# Patient Record
Sex: FEMALE | Race: WHITE | Employment: OTHER | URBAN - NONMETROPOLITAN AREA
[De-identification: names, ages, dates, MRNs, and addresses within clinical notes are randomized per-mention and may not be internally consistent; named-entity substitution may affect disease eponyms.]

---

## 2017-09-17 ENCOUNTER — HOSPITAL ENCOUNTER (EMERGENCY)
Age: 47
Discharge: HOME | End: 2017-09-17
Attending: EMERGENCY MEDICINE
Payer: COMMERCIAL

## 2017-09-17 ENCOUNTER — APPOINTMENT (OUTPATIENT)
Dept: GENERAL RADIOLOGY | Age: 47
End: 2017-09-17
Payer: COMMERCIAL

## 2017-09-17 VITALS
TEMPERATURE: 98.3 F | DIASTOLIC BLOOD PRESSURE: 80 MMHG | HEART RATE: 119 BPM | SYSTOLIC BLOOD PRESSURE: 115 MMHG | RESPIRATION RATE: 18 BRPM | OXYGEN SATURATION: 98 %

## 2017-09-17 DIAGNOSIS — S93.402A SPRAIN OF LEFT ANKLE, UNSPECIFIED LIGAMENT, INITIAL ENCOUNTER: Primary | ICD-10-CM

## 2017-09-17 PROCEDURE — 73610 X-RAY EXAM OF ANKLE: CPT

## 2017-09-17 PROCEDURE — 99283 EMERGENCY DEPT VISIT LOW MDM: CPT

## 2017-09-17 RX ORDER — LORATADINE 10 MG/1
10 TABLET ORAL DAILY
COMMUNITY
End: 2019-04-25 | Stop reason: SDUPTHER

## 2017-09-17 RX ORDER — OMEPRAZOLE 20 MG/1
20 CAPSULE, DELAYED RELEASE ORAL DAILY
COMMUNITY
End: 2019-04-25 | Stop reason: SDUPTHER

## 2017-09-17 RX ORDER — NAPROXEN 375 MG/1
375 TABLET ORAL 2 TIMES DAILY
Qty: 12 TABLET | Refills: 0 | Status: SHIPPED | OUTPATIENT
Start: 2017-09-17 | End: 2019-04-10

## 2017-09-17 RX ORDER — LISINOPRIL 20 MG/1
20 TABLET ORAL DAILY
COMMUNITY
End: 2019-02-25 | Stop reason: ALTCHOICE

## 2017-09-17 RX ORDER — SIMVASTATIN 40 MG
40 TABLET ORAL NIGHTLY
COMMUNITY
End: 2019-04-25 | Stop reason: SDUPTHER

## 2017-09-17 ASSESSMENT — PAIN DESCRIPTION - ONSET: ONSET: SUDDEN

## 2017-09-17 ASSESSMENT — PAIN DESCRIPTION - DESCRIPTORS: DESCRIPTORS: DULL

## 2017-09-17 ASSESSMENT — PAIN DESCRIPTION - LOCATION: LOCATION: ANKLE

## 2017-09-17 ASSESSMENT — PAIN DESCRIPTION - PAIN TYPE: TYPE: ACUTE PAIN

## 2017-09-17 ASSESSMENT — PAIN SCALES - GENERAL: PAINLEVEL_OUTOF10: 2

## 2017-09-17 ASSESSMENT — PAIN DESCRIPTION - DIRECTION: RADIATING_TOWARDS: DENIES

## 2017-09-17 ASSESSMENT — PAIN DESCRIPTION - PROGRESSION: CLINICAL_PROGRESSION: GRADUALLY IMPROVING

## 2017-09-17 ASSESSMENT — PAIN DESCRIPTION - FREQUENCY: FREQUENCY: INTERMITTENT

## 2017-09-17 ASSESSMENT — PAIN DESCRIPTION - ORIENTATION: ORIENTATION: LEFT;OUTER

## 2017-10-01 ENCOUNTER — HOSPITAL ENCOUNTER (EMERGENCY)
Age: 47
Discharge: HOME | End: 2017-10-01
Attending: EMERGENCY MEDICINE
Payer: COMMERCIAL

## 2017-10-01 VITALS
OXYGEN SATURATION: 99 % | DIASTOLIC BLOOD PRESSURE: 100 MMHG | RESPIRATION RATE: 20 BRPM | HEART RATE: 100 BPM | SYSTOLIC BLOOD PRESSURE: 143 MMHG | TEMPERATURE: 96 F

## 2017-10-01 DIAGNOSIS — B30.9 VIRAL CONJUNCTIVITIS: ICD-10-CM

## 2017-10-01 DIAGNOSIS — J06.9 VIRAL URI: Primary | ICD-10-CM

## 2017-10-01 PROCEDURE — 99282 EMERGENCY DEPT VISIT SF MDM: CPT

## 2017-10-01 RX ORDER — AMOXICILLIN 250 MG/1
250 CAPSULE ORAL 3 TIMES DAILY
COMMUNITY
End: 2018-10-15 | Stop reason: ALTCHOICE

## 2017-10-01 RX ORDER — PREDNISONE 20 MG/1
40 TABLET ORAL DAILY
Qty: 10 TABLET | Refills: 0 | Status: SHIPPED | OUTPATIENT
Start: 2017-10-01 | End: 2017-10-06

## 2017-10-01 RX ORDER — BROMPHENIRAMINE MALEATE, PSEUDOEPHEDRINE HYDROCHLORIDE, AND DEXTROMETHORPHAN HYDROBROMIDE 2; 30; 10 MG/5ML; MG/5ML; MG/5ML
5 SYRUP ORAL 4 TIMES DAILY PRN
Qty: 100 ML | Refills: 0 | Status: SHIPPED | OUTPATIENT
Start: 2017-10-01 | End: 2017-10-06

## 2017-10-01 ASSESSMENT — PAIN SCALES - GENERAL
PAINLEVEL_OUTOF10: 6
PAINLEVEL_OUTOF10: 6

## 2017-10-01 ASSESSMENT — PAIN DESCRIPTION - LOCATION: LOCATION: GENERALIZED

## 2017-10-01 NOTE — ED PROVIDER NOTES
N/A     Social History Main Topics    Smoking status: Never Smoker    Smokeless tobacco: Never Used    Alcohol use No    Drug use: No    Sexual activity: Not Asked     Other Topics Concern    None     Social History Narrative       PHYSICAL EXAM    VITAL SIGNS: BP (!) 143/100 Comment: states she did not take BP med yet today  Temp 96 °F (35.6 °C) (Temporal)   Resp 20  SpO2 99%  Constitutional:  Well developed, well nourished, no acute distress, non-toxic appearance   Eyes: PERRL, left conjunctival injection mild HENT:  Nasal congestion,  Respiratory:  Lungs are clear to auscultation bilateral  Cardiovascular:  Normal rate, normal rhythm, no murmurs, no gallops, no rubs   Musculoskeletal:  No edema   Integument:  Well hydrated, no rash     RADIOLOGY/PROCEDURES    No orders to display         Summation      Patient Course: The warning signs were discussed. Return to ED if worse. ED Medications administered this visit:  Medications - No data to display    New Prescriptions from this visit:    New Prescriptions    BROMPHENIRAMINE-PSEUDOEPHEDRINE-DM 30-2-10 MG/5ML SYRUP    Take 5 mLs by mouth 4 times daily as needed for Congestion or Cough    NAPHAZOLINE-PHENIRAMINE (NAPHCON-A) 0.025-0.3 % OPHTHALMIC SOLUTION    Place 1 drop into the left eye 4 times daily    PREDNISONE (DELTASONE) 20 MG TABLET    Take 2 tablets by mouth daily for 5 doses       Follow-up:  No follow-up provider specified. Final Impression:   1. Viral URI    2.  Viral conjunctivitis               (Please note that portions of this note were completed with a voice recognition program.  Efforts were made to edit the dictations but occasionally words are mis-transcribed.)         Becky Cortez MD  10/01/17 0298

## 2017-10-01 NOTE — ED AVS SNAPSHOT
naphazoline-pheniramine 0.025-0.3 % ophthalmic solution    predniSONE 20 MG tablet               Allergies as of 10/1/2017     No Known Allergies      Immunizations as of 10/1/2017     No immunizations on file. After Visit Summary    This summary was created for you. Thank you for entrusting your care to us. The following information includes details about your hospital/visit stay along with steps you should take to help with your recovery once you leave the hospital.  In this packet, you will find information about the topics listed below:    · Instructions about your medications including a list of your home medications  · A summary of your hospital visit  · Follow-up appointments once you have left the hospital  · Your care plan at home      You may receive a survey regarding the care you received during your stay. Your input is valuable to us. We encourage you to complete and return your survey in the envelope provided. We hope you will choose us in the future for your healthcare needs. Patient Information     Patient Name CHERELLE Brown 1970      Care Provided at:     Name Address Phone       [address] [phone]            Your Visit    Here you will find information about your visit, including the reason for your visit. Please take this sheet with you when you visit your doctor or other health care provider in the future. It will help determine the best possible medical care for you at that time. If you have any questions once you leave the hospital, please call the department phone number listed below. Diagnoses this visit     Your diagnoses were VIRAL URI and VIRAL CONJUNCTIVITIS.       Visit Information     Date of Visit Department Dept Phone    10/1/2017 [facility] -[phone]      You were seen by     You were seen by Irasema Wills MD.       Follow-up Appointments Below is a list of your follow-up and future appointments. This may not be a complete list as you may have made appointments directly with providers that we are not aware of or your providers may have made some for you. Please call your providers to confirm appointments. It is important to keep your appointments. Please bring your current insurance card, photo ID, co-pay, and all medication bottles to your appointment. If self-pay, payment is expected at the time of service. Preventive Care        Date Due    HIV screening is recommended for all people regardless of risk factors  aged 15-65 years at least once (lifetime) who have never been HIV tested. 2/15/1984    Tetanus Combination Vaccine (1 - Tdap) 2/15/1988    Pap Smear 2/15/1990    Cholesterol Screening 2/15/2009    Yearly Flu Vaccine (1) 9/1/2017                 Care Plan Once You Return Home    This section includes instructions you will need to follow once you leave the hospital.  Your care team will discuss these with you, so you and those caring for you know how to best care for your health needs at home. This section may also include educational information about certain health topics that may be of help to you. Important Information if you smoke or are exposed to smoking       SMOKING: QUIT SMOKING. THIS IS THE MOST IMPORTANT ACTION YOU CAN TAKE TO IMPROVE YOUR CURRENT AND FUTURE HEALTH. Call the [address] [place] [address] at [place] NOW ([phone])    Smoking harms nonsmokers. When nonsmokers are around people who smoke, they absorb nicotine, carbon monoxide, and other ingredients of tobacco smoke. DO NOT SMOKE AROUND CHILDREN     Children exposed to secondhand smoke are at an increased risk of:  Sudden Infant Death Syndrome (SIDS), acute respiratory infections, inflammation of the middle ear, and severe asthma.   Over a longer time, it causes heart disease and lung cancer. There is no safe level of exposure to secondhand smoke. MyChart Signup     [employer] allows you to send messages to your doctor, view your test results, renew your prescriptions, schedule appointments, view visit notes, and more. How Do I Sign Up? 1. In your Internet browser, go to [email or web address]  2. Click on the Sign Up Now link in the Sign In box. You will see the New Member Sign Up page. 3. Enter your [employer] Access Code exactly as it appears below. You will not need to use this code after youve completed the sign-up process. If you do not sign up before the expiration date, you must request a new code. [employer] Access Code: [email or web address]  Expires: 11/16/2017  7:18 PM    4. Enter your Social Security Number ([id number]) and Date of Birth (mm/dd/yyyy) as indicated and click Submit. You will be taken to the next sign-up page. 5. Create a [employer] ID. This will be your [employer] login ID and cannot be changed, so think of one that is secure and easy to remember. 6. Create a [employer] password. You can change your password at any time. 7. Enter your Password Reset Question and Answer. This can be used at a later time if you forget your password. 8. Enter your e-mail address. You will receive e-mail notification when new information is available in [address]. 9. Click Sign Up. You can now view your medical record. Additional Information  If you have questions, please contact the physician practice where you receive care. Remember, [employer] is NOT to be used for urgent needs. For medical emergencies, dial 911. For questions regarding your [employer] account call [phone]. If you have a clinical question, please call your doctor's office. View your information online  ? Review your current list of  medications, immunization, and allergies. ? Review your future test results online . ? Review your discharge instructions provided by your caregivers at discharge    Certain functionality such as prescription refills, scheduling appointments or sending messages to your provider are not activated if your provider does not use [employer] in his/her office    For questions regarding your MyChart account call [phone]. If you have a clinical question, please call your doctor's office. The information on all pages of the After Visit Summary has been reviewed with me, the patient and/or responsible adult, by my health care provider(s). I had the opportunity to ask questions regarding this information. I understand I should dispose of my armband safely at home to protect my health information. A complete copy of the After Visit Summary has been given to me, the patient and/or responsible adult. Patient Signature/Responsible Adult: ___________________________________    Nurse Signature: ___________________________________  Resident/MLP Signature: ___________________________________  Attending Signature: ___________________________________    Date:____________Time:____________              Discharge Instructions            Pinkeye: Care Instructions  Your Care Instructions    Pinkeye is redness and swelling of the eye surface and the conjunctiva (the lining of the eyelid and the covering of the white part of the eye). Pinkeye is also called conjunctivitis. Pinkeye is often caused by infection with bacteria or a virus. Dry air, allergies, smoke, and chemicals are other common causes. Pinkeye often clears on its own in 7 to 10 days. Antibiotics only help if the pinkeye is caused by bacteria. Pinkeye caused by infection spreads easily. If an allergy or chemical is causing pinkeye, it will not go away unless you can avoid whatever is causing it. Follow-up care is a key part of your treatment and safety.  Be sure to make and go to all appointments, and call your doctor if you are having

## 2017-10-02 RX ORDER — GABAPENTIN 100 MG/1
100 CAPSULE ORAL NIGHTLY
COMMUNITY
End: 2019-02-25 | Stop reason: ALTCHOICE

## 2018-10-15 ENCOUNTER — OFFICE VISIT (OUTPATIENT)
Dept: PRIMARY CARE CLINIC | Age: 48
End: 2018-10-15
Payer: COMMERCIAL

## 2018-10-15 VITALS
HEART RATE: 93 BPM | WEIGHT: 250 LBS | DIASTOLIC BLOOD PRESSURE: 89 MMHG | TEMPERATURE: 98.1 F | OXYGEN SATURATION: 99 % | SYSTOLIC BLOOD PRESSURE: 136 MMHG

## 2018-10-15 DIAGNOSIS — J01.00 ACUTE MAXILLARY SINUSITIS, RECURRENCE NOT SPECIFIED: Primary | ICD-10-CM

## 2018-10-15 PROCEDURE — G8484 FLU IMMUNIZE NO ADMIN: HCPCS | Performed by: NURSE PRACTITIONER

## 2018-10-15 PROCEDURE — 99202 OFFICE O/P NEW SF 15 MIN: CPT | Performed by: NURSE PRACTITIONER

## 2018-10-15 PROCEDURE — G8421 BMI NOT CALCULATED: HCPCS | Performed by: NURSE PRACTITIONER

## 2018-10-15 PROCEDURE — G8427 DOCREV CUR MEDS BY ELIG CLIN: HCPCS | Performed by: NURSE PRACTITIONER

## 2018-10-15 PROCEDURE — 1036F TOBACCO NON-USER: CPT | Performed by: NURSE PRACTITIONER

## 2018-10-15 RX ORDER — AMOXICILLIN AND CLAVULANATE POTASSIUM 875; 125 MG/1; MG/1
1 TABLET, FILM COATED ORAL 2 TIMES DAILY
Qty: 20 TABLET | Refills: 0 | Status: SHIPPED | OUTPATIENT
Start: 2018-10-15 | End: 2018-10-25

## 2018-10-15 RX ORDER — ESTRADIOL 0.5 MG/1
TABLET ORAL
Refills: 0 | COMMUNITY
Start: 2018-09-12 | End: 2019-04-25 | Stop reason: ALTCHOICE

## 2018-10-15 RX ORDER — GUAIFENESIN 600 MG/1
600 TABLET, EXTENDED RELEASE ORAL 2 TIMES DAILY
Qty: 20 TABLET | Refills: 0 | Status: SHIPPED | OUTPATIENT
Start: 2018-10-15 | End: 2018-10-25

## 2018-10-15 RX ORDER — SERTRALINE HYDROCHLORIDE 100 MG/1
100 TABLET, FILM COATED ORAL DAILY
Refills: 0 | COMMUNITY
Start: 2018-08-09 | End: 2019-04-25 | Stop reason: SDUPTHER

## 2018-10-15 RX ORDER — GLUCOSAMINE/CHONDR SU A SOD 750-600 MG
TABLET ORAL
Refills: 0 | COMMUNITY
Start: 2018-10-10 | End: 2019-04-10

## 2018-10-15 RX ORDER — CYCLOBENZAPRINE HCL 10 MG
TABLET ORAL
Refills: 0 | COMMUNITY
Start: 2018-09-13 | End: 2019-02-25 | Stop reason: SDUPTHER

## 2018-10-15 ASSESSMENT — ENCOUNTER SYMPTOMS
HOARSE VOICE: 1
RHINORRHEA: 1
NAUSEA: 0
DIARRHEA: 0
VOMITING: 0
SINUS PAIN: 1
SORE THROAT: 1
SINUS PRESSURE: 1

## 2018-10-15 NOTE — PATIENT INSTRUCTIONS
taking amoxicillin with clarithromycin and/or lansoprazole to treat stomach ulcer, use all of your medications as directed. Read the medication guide or patient instructions provided with each medication. Do not change your doses or medication schedule without your doctor's advice. Use this medicine for the full prescribed length of time. Your symptoms may improve before the infection is completely cleared. Skipping doses may also increase your risk of further infection that is resistant to antibiotics. Amoxicillin will not treat a viral infection such as the flu or a common cold. Do not share this medicine with another person, even if they have the same symptoms you have. This medicine can cause unusual results with certain medical tests. Tell any doctor who treats you that you are using amoxicillin. Store at room temperature away from moisture, heat, and light. You may store liquid amoxicillin in a refrigerator but do not allow it to freeze. Throw away any liquid amoxicillin that is not used within 14 days after it was mixed at the pharmacy. What happens if I miss a dose? Take the missed dose as soon as you remember. Skip the missed dose if it is almost time for your next scheduled dose. Do not take extra medicine to make up the missed dose. What happens if I overdose? Seek emergency medical attention or call the Poison Help line at 1-601.873.3020. Overdose symptoms may include confusion, behavior changes, a severe skin rash, urinating less than usual, or seizure (black-out or convulsions). What should I avoid while taking amoxicillin? Antibiotic medicines can cause diarrhea, which may be a sign of a new infection. If you have diarrhea that is watery or bloody, stop using amoxicillin and call your doctor. Do not use anti-diarrhea medicine unless your doctor tells you to. What are the possible side effects of amoxicillin?   Get emergency medical help if you have any of these signs of an allergic reaction: hives; difficulty breathing; swelling of your face, lips, tongue, or throat. Call your doctor at once if you have:  · diarrhea that is watery or bloody;  · fever, swollen gums, painful mouth sores, pain when swallowing, skin sores, cold or flu symptoms, cough, trouble breathing;  · swollen glands, rash or itching, joint pain, or general ill feeling;  · pale or yellowed skin, yellowing of the eyes, dark colored urine, fever, confusion or weakness;  · severe tingling, numbness, pain, muscle weakness;  · easy bruising, unusual bleeding (nose, mouth, vagina, or rectum), purple or red pinpoint spots under your skin; or  · severe skin reaction --fever, sore throat, swelling in your face or tongue, burning in your eyes, skin pain, followed by a red or purple skin rash that spreads (especially in the face or upper body) and causes blistering and peeling. Common side effects may include:  · stomach pain, nausea, vomiting, diarrhea;  · vaginal itching or discharge;  · headache; or  · swollen, black, or \"hairy\" tongue. This is not a complete list of side effects and others may occur. Call your doctor for medical advice about side effects. You may report side effects to FDA at 6-707-FDA-4000. What other drugs will affect amoxicillin? Other drugs may interact with amoxicillin, including prescription and over-the-counter medicines, vitamins, and herbal products. Tell each of your health care providers about all medicines you use now and any medicine you start or stop using. Where can I get more information? Your pharmacist can provide more information about amoxicillin. Remember, keep this and all other medicines out of the reach of children, never share your medicines with others, and use this medication only for the indication prescribed.   Every effort has been made to ensure that the information provided by [address] is accurate, up-to-date, and complete, but no guarantee is made to that effect. Drug information contained herein may be time sensitive. [employer] information has been compiled for use by healthcare practitioners and consumers in the [place] and therefore [employer] does not warrant that uses outside of the [place] are appropriate, unless specifically indicated otherwise. [employer] drug information does not endorse drugs, diagnose patients or recommend therapy. [employer] drug information is an informational resource designed to assist licensed healthcare practitioners in caring for their patients and/or to serve consumers viewing this service as a supplement to, and not a substitute for, the expertise, skill, knowledge and judgment of healthcare practitioners. The absence of a warning for a given drug or drug combination in no way should be construed to indicate that the drug or drug combination is safe, effective or appropriate for any given patient. [facility] does not assume any responsibility for any aspect of healthcare administered with the aid of information [facility] provides. The information contained herein is not intended to cover all possible uses, directions, precautions, warnings, drug interactions, allergic reactions, or adverse effects. If you have questions about the drugs you are taking, check with your doctor, nurse or pharmacist.  Copyright 6579-1621 [address] Version: 9.05. Revision date: 7/22/2016. Care instructions adapted under license by [facility] [address] If you have questions about a medical condition or this instruction, always ask your healthcare professional. [place] [address] any warranty or liability for your use of this information. Sinusitis: Care Instructions  Your Care Instructions    Sinusitis is an infection of the lining of the sinus cavities in your head. Sinusitis often follows a cold. It causes pain and pressure in your head and face. In most cases, sinusitis gets better on its own in 1 to 2 weeks.  But some mild seek immediate medical care if:    · You have new or worse swelling or redness in your face or around your eyes.     · You have a new or higher fever.    Watch closely for changes in your health, and be sure to contact your doctor if:    · You have new or worse facial pain.     · The mucus from your nose becomes thicker (like pus) or has new blood in it.     · You are not getting better as expected. Where can you learn more? Go to [email or web address] and sign in to your mytrax account. Enter I180 in the USMD box to learn more about \"Sinusitis: Care Instructions. \"     If you do not have an account, please click on the \"Sign Up Now\" link. Current as of: May 12, 2017  Content Version: 11.7  © 0471-5843 [employer]. Care instructions adapted under license by [facility] ([facility]). If you have questions about a medical condition or this instruction, always ask your healthcare professional. [address] any warranty or liability for your use of this information.

## 2018-12-10 ENCOUNTER — HOSPITAL ENCOUNTER (OUTPATIENT)
Dept: PHYSICAL THERAPY | Age: 48
Setting detail: THERAPIES SERIES
Discharge: HOME | End: 2018-12-10
Payer: COMMERCIAL

## 2018-12-10 PROCEDURE — 97140 MANUAL THERAPY 1/> REGIONS: CPT

## 2018-12-10 PROCEDURE — 97162 PT EVAL MOD COMPLEX 30 MIN: CPT

## 2018-12-10 ASSESSMENT — PAIN SCALES - GENERAL: PAINLEVEL_OUTOF10: 6

## 2018-12-10 ASSESSMENT — PAIN DESCRIPTION - ORIENTATION: ORIENTATION: RIGHT

## 2018-12-10 ASSESSMENT — PAIN DESCRIPTION - LOCATION: LOCATION: BACK;LEG

## 2018-12-14 ENCOUNTER — APPOINTMENT (OUTPATIENT)
Dept: PHYSICAL THERAPY | Age: 48
End: 2018-12-14
Payer: COMMERCIAL

## 2018-12-17 ENCOUNTER — APPOINTMENT (OUTPATIENT)
Dept: PHYSICAL THERAPY | Age: 48
End: 2018-12-17
Payer: COMMERCIAL

## 2018-12-19 ENCOUNTER — HOSPITAL ENCOUNTER (EMERGENCY)
Age: 48
Discharge: HOME | End: 2018-12-19
Attending: FAMILY MEDICINE
Payer: COMMERCIAL

## 2018-12-19 VITALS
HEIGHT: 64 IN | WEIGHT: 257 LBS | HEART RATE: 111 BPM | DIASTOLIC BLOOD PRESSURE: 89 MMHG | BODY MASS INDEX: 43.87 KG/M2 | RESPIRATION RATE: 21 BRPM | OXYGEN SATURATION: 96 % | TEMPERATURE: 98.6 F | SYSTOLIC BLOOD PRESSURE: 136 MMHG

## 2018-12-19 DIAGNOSIS — R10.13 ABDOMINAL PAIN, EPIGASTRIC: Primary | ICD-10-CM

## 2018-12-19 LAB
-: ABNORMAL
ABSOLUTE EOS #: ABNORMAL K/UL (ref 0–0.4)
ABSOLUTE IMMATURE GRANULOCYTE: ABNORMAL K/UL (ref 0–0.3)
ABSOLUTE LYMPH #: 0.26 K/UL (ref 1–4.8)
ABSOLUTE MONO #: 0.35 K/UL (ref 0–1)
ALBUMIN SERPL-MCNC: 4.2 G/DL (ref 3.5–5.2)
ALBUMIN/GLOBULIN RATIO: ABNORMAL (ref 1–2.5)
ALP BLD-CCNC: 126 U/L (ref 35–104)
ALT SERPL-CCNC: 79 U/L (ref 5–33)
AMORPHOUS: ABNORMAL
AMYLASE: 45 U/L (ref 28–100)
ANION GAP SERPL CALCULATED.3IONS-SCNC: 12 MMOL/L (ref 9–17)
AST SERPL-CCNC: 74 U/L
BACTERIA: ABNORMAL
BASOPHILS # BLD: ABNORMAL % (ref 0–2)
BASOPHILS ABSOLUTE: ABNORMAL K/UL (ref 0–0.2)
BILIRUB SERPL-MCNC: 0.34 MG/DL (ref 0.3–1.2)
BILIRUBIN URINE: ABNORMAL
BUN BLDV-MCNC: 22 MG/DL (ref 6–20)
BUN/CREAT BLD: 31 (ref 9–20)
CALCIUM SERPL-MCNC: 9.4 MG/DL (ref 8.6–10.4)
CASTS UA: ABNORMAL /LPF
CHLORIDE BLD-SCNC: 102 MMOL/L (ref 98–107)
CO2: 25 MMOL/L (ref 20–31)
COLOR: YELLOW
COMMENT UA: ABNORMAL
CREAT SERPL-MCNC: 0.7 MG/DL (ref 0.5–0.9)
CRYSTALS, UA: ABNORMAL /HPF
DIFFERENTIAL TYPE: ABNORMAL
EKG ATRIAL RATE: 129 BPM
EKG P AXIS: 55 DEGREES
EKG P-R INTERVAL: 142 MS
EKG Q-T INTERVAL: 312 MS
EKG QRS DURATION: 84 MS
EKG QTC CALCULATION (BAZETT): 457 MS
EKG R AXIS: 4 DEGREES
EKG T AXIS: 80 DEGREES
EKG VENTRICULAR RATE: 129 BPM
EOSINOPHILS RELATIVE PERCENT: ABNORMAL % (ref 0–5)
EPITHELIAL CELLS UA: ABNORMAL /HPF
GFR AFRICAN AMERICAN: >60 ML/MIN
GFR NON-AFRICAN AMERICAN: >60 ML/MIN
GFR SERPL CREATININE-BSD FRML MDRD: ABNORMAL ML/MIN/{1.73_M2}
GFR SERPL CREATININE-BSD FRML MDRD: ABNORMAL ML/MIN/{1.73_M2}
GLUCOSE BLD-MCNC: 133 MG/DL (ref 70–99)
GLUCOSE URINE: NEGATIVE
HCT VFR BLD CALC: 46.6 % (ref 36–46)
HEMOGLOBIN: 14.6 G/DL (ref 12–16)
IMMATURE GRANULOCYTES: ABNORMAL %
KETONES, URINE: NEGATIVE
LEUKOCYTE ESTERASE, URINE: NEGATIVE
LIPASE: 30 U/L (ref 13–60)
LYMPHOCYTES # BLD: 3 % (ref 15–40)
MCH RBC QN AUTO: 25.1 PG (ref 26–34)
MCHC RBC AUTO-ENTMCNC: 31.3 G/DL (ref 31–37)
MCV RBC AUTO: 80.3 FL (ref 80–100)
MONOCYTES # BLD: 4 % (ref 4–8)
MORPHOLOGY: ABNORMAL
MUCUS: ABNORMAL
NITRITE, URINE: NEGATIVE
NRBC AUTOMATED: ABNORMAL PER 100 WBC
OTHER OBSERVATIONS UA: ABNORMAL
PDW BLD-RTO: 14.5 % (ref 12.1–15.2)
PH UA: 5 (ref 5–8)
PLATELET # BLD: 245 K/UL (ref 140–450)
PLATELET ESTIMATE: ABNORMAL
PMV BLD AUTO: ABNORMAL FL (ref 6–12)
POTASSIUM SERPL-SCNC: 4.4 MMOL/L (ref 3.7–5.3)
PROTEIN UA: ABNORMAL
RBC # BLD: 5.81 M/UL (ref 4–5.2)
RBC # BLD: ABNORMAL 10*6/UL
RBC UA: ABNORMAL /HPF (ref 0–2)
RENAL EPITHELIAL, UA: ABNORMAL /HPF
SEG NEUTROPHILS: 93 % (ref 47–75)
SEGMENTED NEUTROPHILS ABSOLUTE COUNT: 8.19 K/UL (ref 2.5–7)
SODIUM BLD-SCNC: 139 MMOL/L (ref 135–144)
SPECIFIC GRAVITY UA: 1.02 (ref 1–1.03)
TOTAL PROTEIN: 7.4 G/DL (ref 6.4–8.3)
TRICHOMONAS: ABNORMAL
TROPONIN INTERP: NORMAL
TROPONIN T: <0.03 NG/ML
TROPONIN, HIGH SENSITIVITY: NORMAL NG/L (ref 0–14)
TURBIDITY: CLEAR
URINE HGB: NEGATIVE
UROBILINOGEN, URINE: NORMAL
WBC # BLD: 8.8 K/UL (ref 3.5–11)
WBC # BLD: ABNORMAL 10*3/UL
WBC UA: ABNORMAL /HPF
YEAST: ABNORMAL

## 2018-12-19 PROCEDURE — 93005 ELECTROCARDIOGRAM TRACING: CPT

## 2018-12-19 PROCEDURE — 80053 COMPREHEN METABOLIC PANEL: CPT

## 2018-12-19 PROCEDURE — 83690 ASSAY OF LIPASE: CPT

## 2018-12-19 PROCEDURE — 85025 COMPLETE CBC W/AUTO DIFF WBC: CPT

## 2018-12-19 PROCEDURE — 84484 ASSAY OF TROPONIN QUANT: CPT

## 2018-12-19 PROCEDURE — 2580000003 HC RX 258: Performed by: FAMILY MEDICINE

## 2018-12-19 PROCEDURE — 82150 ASSAY OF AMYLASE: CPT

## 2018-12-19 PROCEDURE — 6360000002 HC RX W HCPCS: Performed by: FAMILY MEDICINE

## 2018-12-19 PROCEDURE — 96374 THER/PROPH/DIAG INJ IV PUSH: CPT

## 2018-12-19 PROCEDURE — 81001 URINALYSIS AUTO W/SCOPE: CPT

## 2018-12-19 PROCEDURE — 99284 EMERGENCY DEPT VISIT MOD MDM: CPT

## 2018-12-19 RX ORDER — ACETAMINOPHEN 160 MG
1 TABLET,DISINTEGRATING ORAL DAILY
COMMUNITY
End: 2019-04-25 | Stop reason: SDUPTHER

## 2018-12-19 RX ORDER — LORATADINE 10 MG/1
10 TABLET ORAL DAILY
COMMUNITY
End: 2019-04-10 | Stop reason: SDUPTHER

## 2018-12-19 RX ORDER — SIMVASTATIN 40 MG
40 TABLET ORAL NIGHTLY
COMMUNITY
End: 2019-02-25 | Stop reason: SDUPTHER

## 2018-12-19 RX ORDER — ONDANSETRON 2 MG/ML
4 INJECTION INTRAMUSCULAR; INTRAVENOUS ONCE
Status: COMPLETED | OUTPATIENT
Start: 2018-12-19 | End: 2018-12-19

## 2018-12-19 RX ORDER — SERTRALINE HYDROCHLORIDE 100 MG/1
100 TABLET, FILM COATED ORAL DAILY
COMMUNITY
End: 2019-02-25 | Stop reason: SDUPTHER

## 2018-12-19 RX ORDER — FAMOTIDINE 20 MG/1
20 TABLET, FILM COATED ORAL NIGHTLY
Qty: 30 TABLET | Refills: 1 | Status: SHIPPED | OUTPATIENT
Start: 2018-12-19 | End: 2019-04-25

## 2018-12-19 RX ORDER — OMEPRAZOLE 20 MG/1
20 CAPSULE, DELAYED RELEASE ORAL DAILY
COMMUNITY
End: 2019-02-25 | Stop reason: SDUPTHER

## 2018-12-19 RX ORDER — CYCLOBENZAPRINE HCL 10 MG
10 TABLET ORAL NIGHTLY PRN
COMMUNITY
End: 2019-04-25 | Stop reason: SDUPTHER

## 2018-12-19 RX ORDER — 0.9 % SODIUM CHLORIDE 0.9 %
1000 INTRAVENOUS SOLUTION INTRAVENOUS ONCE
Status: COMPLETED | OUTPATIENT
Start: 2018-12-19 | End: 2018-12-19

## 2018-12-19 RX ORDER — GABAPENTIN 300 MG/1
300 CAPSULE ORAL NIGHTLY
COMMUNITY
End: 2019-04-25 | Stop reason: ALTCHOICE

## 2018-12-19 RX ADMIN — ONDANSETRON 4 MG: 2 INJECTION INTRAMUSCULAR; INTRAVENOUS at 18:03

## 2018-12-19 RX ADMIN — SODIUM CHLORIDE 1000 ML: 9 INJECTION, SOLUTION INTRAVENOUS at 18:03

## 2018-12-19 ASSESSMENT — PAIN DESCRIPTION - LOCATION: LOCATION: ABDOMEN

## 2018-12-19 ASSESSMENT — PAIN SCALES - GENERAL: PAINLEVEL_OUTOF10: 2

## 2018-12-19 ASSESSMENT — PAIN DESCRIPTION - ONSET: ONSET: ON-GOING

## 2018-12-19 ASSESSMENT — PAIN DESCRIPTION - PROGRESSION: CLINICAL_PROGRESSION: GRADUALLY WORSENING

## 2018-12-19 ASSESSMENT — PAIN DESCRIPTION - DESCRIPTORS: DESCRIPTORS: CONSTANT

## 2018-12-19 ASSESSMENT — PAIN DESCRIPTION - FREQUENCY: FREQUENCY: CONTINUOUS

## 2018-12-19 ASSESSMENT — PAIN DESCRIPTION - ORIENTATION: ORIENTATION: MID;LOWER;UPPER

## 2018-12-19 ASSESSMENT — PAIN DESCRIPTION - PAIN TYPE: TYPE: ACUTE PAIN

## 2018-12-19 NOTE — ED PROVIDER NOTES
eMERGENCY dEPARTMENT eNCOUnter      [address] [facility]    Chief Complaint   Patient presents with    Abdominal Pain     mid abd pain that started at 0230 today       HPI    Maryanne Gomez is a 51 y.o. female who presents Abdominal pain which started at 2:30 AM in the morning. The pain is intermittent abdomen and associated with gas and diarrhea. She did have vomiting today. She had significant medial last night with cauliflower broccoli. This may have resulted in significant indigestion. The patient thinks it may have bothered her acid reflux problem. She does usually take omeprazole. She had some diarrhea but no bloody stools. She has not had any recent antibiotic use. History of prior cholecystectomy. No urinary difficulty. REVIEW OF SYSTEMS    All body systems reviewed with pertinent positive and negative findings mentioned in the history of present illness. PAST MEDICAL HISTORY    Past Medical History:   Diagnosis Date    Hypertension        SURGICAL HISTORY    Past Surgical History:   Procedure Laterality Date    CHOLECYSTECTOMY      HYSTERECTOMY      TONSILLECTOMY         CURRENT MEDICATIONS        ALLERGIES    No Known Allergies    FAMILY HISTORY    History reviewed. No pertinent family history.     SOCIAL HISTORY    Social History     Social History    Marital status:      Spouse name: N/A    Number of children: N/A    Years of education: N/A     Social History Main Topics    Smoking status: Never Smoker    Smokeless tobacco: Never Used    Alcohol use No    Drug use: No    Sexual activity: Not Currently     Other Topics Concern    None     Social History Narrative    None       PHYSICAL EXAM    VITAL SIGNS: BP (!) 135/92   Pulse 119   Temp 98.6 °F (37 °C) (Oral)   Resp 20   Ht 5' 4\" (1.626 m)   Wt 257 lb (116.6 kg)   SpO2 98%   BMI 44.11 kg/m²   Constitutional:  Well developed, well nourished, 43-year-old female with epigastric and upper abdominal pain, no acute

## 2018-12-21 ENCOUNTER — APPOINTMENT (OUTPATIENT)
Dept: PHYSICAL THERAPY | Age: 48
End: 2018-12-21
Payer: COMMERCIAL

## 2018-12-26 ENCOUNTER — APPOINTMENT (OUTPATIENT)
Dept: PHYSICAL THERAPY | Age: 48
End: 2018-12-26
Payer: COMMERCIAL

## 2018-12-28 ENCOUNTER — APPOINTMENT (OUTPATIENT)
Dept: PHYSICAL THERAPY | Age: 48
End: 2018-12-28
Payer: COMMERCIAL

## 2019-02-25 ENCOUNTER — OFFICE VISIT (OUTPATIENT)
Dept: PRIMARY CARE CLINIC | Age: 49
End: 2019-02-25
Payer: COMMERCIAL

## 2019-02-25 VITALS
DIASTOLIC BLOOD PRESSURE: 74 MMHG | SYSTOLIC BLOOD PRESSURE: 138 MMHG | OXYGEN SATURATION: 99 % | WEIGHT: 261 LBS | TEMPERATURE: 97.7 F | BODY MASS INDEX: 44.8 KG/M2 | HEART RATE: 100 BPM

## 2019-02-25 DIAGNOSIS — J01.40 ACUTE NON-RECURRENT PANSINUSITIS: Primary | ICD-10-CM

## 2019-02-25 PROCEDURE — 99213 OFFICE O/P EST LOW 20 MIN: CPT | Performed by: NURSE PRACTITIONER

## 2019-02-25 RX ORDER — GUAIFENESIN 600 MG/1
600 TABLET, EXTENDED RELEASE ORAL 2 TIMES DAILY
Qty: 20 TABLET | Refills: 0 | Status: SHIPPED | OUTPATIENT
Start: 2019-02-25 | End: 2019-03-07

## 2019-02-25 RX ORDER — BENZONATATE 100 MG/1
100 CAPSULE ORAL 3 TIMES DAILY PRN
Qty: 21 CAPSULE | Refills: 0 | Status: SHIPPED | OUTPATIENT
Start: 2019-02-25 | End: 2019-03-04

## 2019-02-25 RX ORDER — AMOXICILLIN AND CLAVULANATE POTASSIUM 875; 125 MG/1; MG/1
1 TABLET, FILM COATED ORAL 2 TIMES DAILY
Qty: 20 TABLET | Refills: 0 | Status: SHIPPED | OUTPATIENT
Start: 2019-02-25 | End: 2019-03-07

## 2019-02-25 RX ORDER — LISINOPRIL 40 MG/1
TABLET ORAL
Refills: 0 | COMMUNITY
Start: 2019-01-15 | End: 2019-04-25 | Stop reason: SDUPTHER

## 2019-02-25 RX ORDER — NAPROXEN 500 MG/1
500 TABLET ORAL NIGHTLY
Refills: 0 | COMMUNITY
Start: 2019-01-16 | End: 2019-04-25 | Stop reason: ALTCHOICE

## 2019-02-25 RX ORDER — CHOLECALCIFEROL (VITAMIN D3) 50 MCG
TABLET ORAL
Refills: 0 | COMMUNITY
Start: 2019-02-13 | End: 2019-04-25 | Stop reason: SDUPTHER

## 2019-02-25 ASSESSMENT — ENCOUNTER SYMPTOMS
SINUS PRESSURE: 1
VOMITING: 0
COUGH: 1
SINUS COMPLAINT: 1
SHORTNESS OF BREATH: 0
SORE THROAT: 0
SINUS PAIN: 1
NAUSEA: 0
DIARRHEA: 0
RHINORRHEA: 1
WHEEZING: 0

## 2019-04-10 ENCOUNTER — APPOINTMENT (OUTPATIENT)
Dept: GENERAL RADIOLOGY | Age: 49
End: 2019-04-10
Payer: COMMERCIAL

## 2019-04-10 ENCOUNTER — HOSPITAL ENCOUNTER (EMERGENCY)
Age: 49
Discharge: HOME | End: 2019-04-10
Attending: FAMILY MEDICINE
Payer: COMMERCIAL

## 2019-04-10 VITALS
HEART RATE: 99 BPM | BODY MASS INDEX: 44.41 KG/M2 | RESPIRATION RATE: 18 BRPM | OXYGEN SATURATION: 98 % | SYSTOLIC BLOOD PRESSURE: 142 MMHG | WEIGHT: 260.14 LBS | DIASTOLIC BLOOD PRESSURE: 98 MMHG | HEIGHT: 64 IN | TEMPERATURE: 97.7 F

## 2019-04-10 DIAGNOSIS — M75.22 BICEPS TENDINITIS OF LEFT UPPER EXTREMITY: Primary | ICD-10-CM

## 2019-04-10 PROCEDURE — 73030 X-RAY EXAM OF SHOULDER: CPT

## 2019-04-10 PROCEDURE — 99283 EMERGENCY DEPT VISIT LOW MDM: CPT

## 2019-04-10 RX ORDER — PREDNISONE 20 MG/1
40 TABLET ORAL DAILY
Qty: 10 TABLET | Refills: 0 | Status: SHIPPED | OUTPATIENT
Start: 2019-04-10 | End: 2019-04-15

## 2019-04-10 ASSESSMENT — PAIN SCALES - GENERAL: PAINLEVEL_OUTOF10: 3

## 2019-04-11 ASSESSMENT — ENCOUNTER SYMPTOMS
RHINORRHEA: 0
ABDOMINAL DISTENTION: 0
SHORTNESS OF BREATH: 0
DIARRHEA: 0
COUGH: 0
ABDOMINAL PAIN: 0

## 2019-04-11 NOTE — ED PROVIDER NOTES
by mouth nightlyHistorical Med      loratadine (CLARITIN) 10 MG tablet Take 10 mg by mouth dailyHistorical Med      omeprazole (PRILOSEC) 20 MG delayed release capsule Take 20 mg by mouth dailyHistorical Med             ALLERGIES    No Known Allergies    FAMILY HISTORY    History reviewed. No pertinent family history. SOCIAL HISTORY    Social History     Socioeconomic History    Marital status:      Spouse name: None    Number of children: None    Years of education: None    Highest education level: None   Occupational History    None   Social Needs    Financial resource strain: None    Food insecurity:     Worry: None     Inability: None    Transportation needs:     Medical: None     Non-medical: None   Tobacco Use    Smoking status: Never Smoker    Smokeless tobacco: Never Used   Substance and Sexual Activity    Alcohol use: No    Drug use: No    Sexual activity: Not Currently   Lifestyle    Physical activity:     Days per week: None     Minutes per session: None    Stress: None   Relationships    Social connections:     Talks on phone: None     Gets together: None     Attends Buddhism service: None     Active member of club or organization: None     Attends meetings of clubs or organizations: None     Relationship status: None    Intimate partner violence:     Fear of current or ex partner: None     Emotionally abused: None     Physically abused: None     Forced sexual activity: None   Other Topics Concern    None   Social History Narrative    ** Merged History Encounter **            REVIEW OF SYSTEMS    Review of Systems   Constitutional: Negative for chills, fatigue and fever. HENT: Negative for congestion and rhinorrhea. Respiratory: Negative for cough and shortness of breath. Gastrointestinal: Negative for abdominal distention, abdominal pain and diarrhea. Genitourinary: Negative for decreased urine volume and dysuria.    Musculoskeletal: Negative for gait problem and joint swelling. Skin: Negative for rash and wound. Neurological: Negative for syncope and headaches. Psychiatric/Behavioral: Negative for agitation and behavioral problems. PHYSICAL EXAM    VITAL SIGNS: BP (!) 142/98   Pulse 99   Temp 97.7 °F (36.5 °C)   Resp 18   Ht 5' 4\" (1.626 m)   Wt 260 lb 2.3 oz (118 kg)   SpO2 98%   BMI 44.65 kg/m²    Physical Exam   Constitutional: She is oriented to person, place, and time. She appears well-developed and well-nourished. No distress. HENT:   Head: Normocephalic and atraumatic. Right Ear: External ear normal.   Left Ear: External ear normal.   Eyes: Right eye exhibits no discharge. Left eye exhibits no discharge. Neck: No JVD present. No tracheal deviation present. Cardiovascular: Normal rate and regular rhythm. Exam reveals no gallop and no friction rub. No murmur heard. Pulmonary/Chest: Effort normal and breath sounds normal. No respiratory distress. She has no wheezes. She has no rales. Abdominal: Soft. Bowel sounds are normal.   Musculoskeletal: She exhibits no edema. Left shoulder: Normal. She exhibits normal range of motion, no tenderness and no bony tenderness. Left upper arm: She exhibits tenderness. She exhibits no swelling, no edema and no deformity. Patient is tender on left biceps tendon also has some decreased strength no deformity felt no pop I swelling. No redness or concerns for infection. Neurological: She is alert and oriented to person, place, and time. No cranial nerve deficit. cn2-12 grossly intact   Skin: Capillary refill takes less than 2 seconds. No rash noted. She is not diaphoretic. No erythema. Psychiatric: She has a normal mood and affect. Her behavior is normal. Judgment and thought content normal.            RADIOLOGY    XR SHOULDER LEFT (MIN 2 VIEWS)   Final Result      Minimal age expected degenerative change acromioclavicular joint.                  Labs  No results found for this visit on 04/10/19. Summation      Patient Course: Symptoms reproducible with palpation and movement. Exam consistent with biceps tendinitis. Patient placed in sling to allow for rest and comfort follow-up as outpatient recommended referral to orthopedic surgeon continued symptoms with rest.    ED Medications administered this visit:  Medications - No data to display    New Prescriptions from this visit:    Discharge Medication List as of 4/10/2019  4:22 PM      START taking these medications    Details   predniSONE (DELTASONE) 20 MG tablet Take 2 tablets by mouth daily for 5 days, Disp-10 tablet, R-0Print             Follow-up:  [facility] [place] - [facility] [id number]    Schedule an appointment as soon as possible for a visit       [facility]  [address] [place] [address]  [phone]    As needed, If symptoms worsen        Final Impression:   1.  Biceps tendinitis of left upper extremity Stable              (Please note that portions of this note were completed with a voice recognition program.  Efforts were made to edit the dictations but occasionally words are mis-transcribed.)        Pollo Siddiqi MD  04/11/19 9577

## 2019-04-25 ENCOUNTER — OFFICE VISIT (OUTPATIENT)
Dept: FAMILY MEDICINE CLINIC | Age: 49
End: 2019-04-25
Payer: COMMERCIAL

## 2019-04-25 VITALS
TEMPERATURE: 97.8 F | OXYGEN SATURATION: 97 % | BODY MASS INDEX: 44.22 KG/M2 | DIASTOLIC BLOOD PRESSURE: 88 MMHG | HEART RATE: 94 BPM | SYSTOLIC BLOOD PRESSURE: 128 MMHG | WEIGHT: 259 LBS | HEIGHT: 64 IN

## 2019-04-25 DIAGNOSIS — I10 ESSENTIAL HYPERTENSION: ICD-10-CM

## 2019-04-25 DIAGNOSIS — K21.9 GASTROESOPHAGEAL REFLUX DISEASE WITHOUT ESOPHAGITIS: ICD-10-CM

## 2019-04-25 DIAGNOSIS — M67.912 DYSFUNCTION OF LEFT ROTATOR CUFF: ICD-10-CM

## 2019-04-25 DIAGNOSIS — M54.16 LUMBAR RADICULOPATHY: Primary | ICD-10-CM

## 2019-04-25 DIAGNOSIS — E66.01 CLASS 3 SEVERE OBESITY DUE TO EXCESS CALORIES WITHOUT SERIOUS COMORBIDITY WITH BODY MASS INDEX (BMI) OF 40.0 TO 44.9 IN ADULT (HCC): ICD-10-CM

## 2019-04-25 DIAGNOSIS — E55.9 VITAMIN D DEFICIENCY: ICD-10-CM

## 2019-04-25 DIAGNOSIS — F33.0 MILD EPISODE OF RECURRENT MAJOR DEPRESSIVE DISORDER (HCC): ICD-10-CM

## 2019-04-25 DIAGNOSIS — E78.00 PURE HYPERCHOLESTEROLEMIA: ICD-10-CM

## 2019-04-25 PROCEDURE — 99204 OFFICE O/P NEW MOD 45 MIN: CPT | Performed by: NURSE PRACTITIONER

## 2019-04-25 PROCEDURE — 1036F TOBACCO NON-USER: CPT | Performed by: NURSE PRACTITIONER

## 2019-04-25 PROCEDURE — 3017F COLORECTAL CA SCREEN DOC REV: CPT | Performed by: NURSE PRACTITIONER

## 2019-04-25 PROCEDURE — G8417 CALC BMI ABV UP PARAM F/U: HCPCS | Performed by: NURSE PRACTITIONER

## 2019-04-25 PROCEDURE — G8427 DOCREV CUR MEDS BY ELIG CLIN: HCPCS | Performed by: NURSE PRACTITIONER

## 2019-04-25 RX ORDER — GABAPENTIN 300 MG/1
300 CAPSULE ORAL NIGHTLY
Qty: 30 CAPSULE | Refills: 5 | Status: CANCELLED | OUTPATIENT
Start: 2019-04-25

## 2019-04-25 RX ORDER — CYCLOBENZAPRINE HCL 10 MG
10 TABLET ORAL NIGHTLY PRN
Qty: 30 TABLET | Refills: 5 | Status: SHIPPED | OUTPATIENT
Start: 2019-04-25 | End: 2019-10-05 | Stop reason: SDUPTHER

## 2019-04-25 RX ORDER — OMEPRAZOLE 20 MG/1
20 CAPSULE, DELAYED RELEASE ORAL DAILY
Qty: 30 CAPSULE | Refills: 5 | Status: SHIPPED | OUTPATIENT
Start: 2019-04-25 | End: 2019-10-10 | Stop reason: SDUPTHER

## 2019-04-25 RX ORDER — ESTRADIOL 0.5 MG/1
0.5 TABLET ORAL DAILY
Qty: 30 TABLET | Refills: 5 | Status: CANCELLED | OUTPATIENT
Start: 2019-04-25

## 2019-04-25 RX ORDER — NAPROXEN 500 MG/1
500 TABLET ORAL DAILY
Qty: 30 TABLET | Refills: 5 | Status: CANCELLED | OUTPATIENT
Start: 2019-04-25

## 2019-04-25 RX ORDER — CHOLECALCIFEROL (VITAMIN D3) 50 MCG
2000 TABLET ORAL DAILY
Qty: 30 TABLET | Refills: 5 | Status: SHIPPED | OUTPATIENT
Start: 2019-04-25 | End: 2019-10-10 | Stop reason: SDUPTHER

## 2019-04-25 RX ORDER — ARIPIPRAZOLE 5 MG/1
5 TABLET ORAL DAILY
Qty: 30 TABLET | Refills: 0 | Status: SHIPPED | OUTPATIENT
Start: 2019-04-25 | End: 2019-05-08

## 2019-04-25 RX ORDER — LORATADINE 10 MG/1
10 TABLET ORAL DAILY
Qty: 30 TABLET | Refills: 5 | Status: SHIPPED | OUTPATIENT
Start: 2019-04-25 | End: 2019-10-10 | Stop reason: SDUPTHER

## 2019-04-25 RX ORDER — SIMVASTATIN 40 MG
40 TABLET ORAL NIGHTLY
Qty: 30 TABLET | Refills: 5 | Status: SHIPPED | OUTPATIENT
Start: 2019-04-25 | End: 2019-10-10 | Stop reason: SDUPTHER

## 2019-04-25 RX ORDER — LISINOPRIL 40 MG/1
40 TABLET ORAL DAILY
Qty: 30 TABLET | Refills: 5 | Status: SHIPPED | OUTPATIENT
Start: 2019-04-25 | End: 2019-10-05 | Stop reason: SDUPTHER

## 2019-04-25 RX ORDER — SERTRALINE HYDROCHLORIDE 100 MG/1
100 TABLET, FILM COATED ORAL DAILY
Qty: 30 TABLET | Refills: 5 | Status: SHIPPED | OUTPATIENT
Start: 2019-04-25 | End: 2019-10-10 | Stop reason: SDUPTHER

## 2019-04-25 ASSESSMENT — ENCOUNTER SYMPTOMS
HEARTBURN: 0
ORTHOPNEA: 0
BLOOD IN STOOL: 0
CONSTIPATION: 0
SHORTNESS OF BREATH: 0
BACK PAIN: 0
VOMITING: 0
NAUSEA: 0
SORE THROAT: 0
COUGH: 0
DIARRHEA: 0
ABDOMINAL PAIN: 0

## 2019-04-25 NOTE — PROGRESS NOTES
HPI Notes    Name: Dejan Downey  : 1970         Chief Complaint:     Chief Complaint   Patient presents with    Gastroesophageal Reflux     Patient here today as a new patient. Taking omeprazole daily. She said her last provider discharged her because she missed her appts.  Mental Health Problem     Patient here today for depression . takes sertraline 100mg daily. She does not feel like her depression is controlled    Hyperlipidemia     Patient is taking simvastatin 40mg daily    Muscle Pain     Patient here today for check up on fibromyalgia, she takes gabapentin 300mg at bedtime and flexeril    Hypertension     Patient is taking lisinopril 40mg  daily    Shoulder Pain     Patient complains of left shoulder pain, started 1 week ago       History of Present Illness:        Gastroesophageal Reflux   She reports no abdominal pain, no chest pain, no coughing, no heartburn, no nausea or no sore throat. The current episode started more than 1 year ago. The problem occurs rarely (well controlled with prilosec). Nothing aggravates the symptoms. Risk factors include caffeine use, ETOH use, lack of exercise, NSAIDs and obesity. She has tried a PPI for the symptoms. Mental Health Problem   The primary symptoms include dysphoric mood. The current episode started more than 1 month ago. This is a chronic problem. The onset of the illness is precipitated by emotional stress. The degree of incapacity that she is experiencing as a consequence of her illness is mild. Additional symptoms of the illness include insomnia (started about 1 week). Additional symptoms of the illness do not include feelings of worthlessness, headaches, abdominal pain or seizures. She does not admit to suicidal ideas. She does not have a plan to commit suicide. She does not contemplate harming herself. She has not already injured self. She does not contemplate injuring another person. She has not already  injured another person. Hyperlipidemia   This is a chronic problem. The current episode started more than 1 year ago. The problem is controlled. Recent lipid tests were reviewed and are normal. Factors aggravating her hyperlipidemia include fatty foods. Pertinent negatives include no chest pain or shortness of breath. Current antihyperlipidemic treatment includes statins. The current treatment provides moderate improvement of lipids. Risk factors for coronary artery disease include dyslipidemia, hypertension, obesity, post-menopausal and a sedentary lifestyle. Muscle Pain   This is a recurrent problem. The current episode started more than 1 month ago (6 months). The problem occurs daily. The pain is present in the left lower leg, left knee, left hip, right hip, right knee and right lower leg. The pain is medium. The symptoms are aggravated by any movement (up moving around the house). Associated symptoms include joint swelling (left shoulder). Pertinent negatives include no abdominal pain, chest pain, constipation, diarrhea, dysuria, fever, headaches, nausea, rash, stiffness, shortness of breath, vomiting or weakness. Treatments tried: gabapentin. The treatment provided mild relief. Hypertension   This is a chronic problem. The current episode started more than 1 year ago. The problem is controlled. Associated symptoms include peripheral edema. Pertinent negatives include no chest pain, headaches, neck pain, orthopnea, palpitations or shortness of breath. Risk factors for coronary artery disease include dyslipidemia and obesity. Past treatments include ACE inhibitors. Compliance problems include diet and exercise. Shoulder Pain    The pain is present in the left shoulder, left arm and neck (left neck). This is a new problem. The current episode started in the past 7 days. There has been no history of extremity trauma. The problem occurs constantly. The quality of the pain is described as aching. The pain is moderate.  Associated symptoms include a limited range of motion. Pertinent negatives include no fever, numbness, stiffness or tingling. The symptoms are aggravated by activity. She has tried rest for the symptoms. The treatment provided mild relief. Past Medical History:     Past Medical History:   Diagnosis Date    Anemia     Depression     Hyperlipidemia     Hypertension       Reviewed all health maintenance requirements and ordered appropriate tests  Health Maintenance Due   Topic Date Due    HIV screen  02/15/1984    DTaP/Tdap/Td vaccine (1 - Tdap) 02/15/1988    Cervical cancer screen  02/15/1990    Lipid screen  02/15/2009    Diabetes screen  02/15/2009    Breast cancer screen  02/15/2019    Shingles Vaccine (1 of 2) 02/15/2019    Colon cancer screen colonoscopy  02/15/2019       Past Surgical History:     Past Surgical History:   Procedure Laterality Date    CHOLECYSTECTOMY      HYSTERECTOMY      TONSILLECTOMY          Medications:       Prior to Admission medications    Medication Sig Start Date End Date Taking?  Authorizing Provider   lisinopril (PRINIVIL;ZESTRIL) 40 MG tablet Take 1 tablet by mouth daily 4/25/19  Yes TOSHA Edge CNP   Cholecalciferol (VITAMIN D) 2000 units TABS tablet Take 1 tablet by mouth daily 4/25/19  Yes TOSHA Edge CNP   cyclobenzaprine (FLEXERIL) 10 MG tablet Take 1 tablet by mouth nightly as needed for Muscle spasms 4/25/19  Yes TOSHA Edge CNP   sertraline (ZOLOFT) 100 MG tablet Take 1 tablet by mouth daily 4/25/19  Yes TOSHA Edge CNP   simvastatin (ZOCOR) 40 MG tablet Take 1 tablet by mouth nightly 4/25/19  Yes TOSHA Edge CNP   loratadine (CLARITIN) 10 MG tablet Take 1 tablet by mouth daily 4/25/19  Yes TOSHA Edge CNP   omeprazole (PRILOSEC) 20 MG delayed release capsule Take 1 capsule by mouth daily 4/25/19  Yes TOSHA Edge CNP   diclofenac (VOLTAREN) 50 MG EC tablet Take 1 tablet by mouth 3 times daily (with meals) 4/25/19  Yes TOSHA Juarez CNP   ARIPiprazole (ABILIFY) 5 MG tablet Take 1 tablet by mouth daily 4/25/19  Yes TOSHA Juarez CNP   Multiple Vitamin (ONE DAILY ESSENTIAL PO) Take 1 tablet by mouth daily   Yes Historical Provider, MD        Allergies:       Patient has no known allergies. Social History:     Tobacco:    reports that she has never smoked. She has never used smokeless tobacco.  Alcohol:      reports that she does not drink alcohol. Drug Use:  reports that she does not use drugs. Family History:      No family history on file. Review of Systems:         Review of Systems   Constitutional: Negative for chills and fever. HENT: Negative for sore throat. Respiratory: Negative for cough and shortness of breath. Cardiovascular: Negative for chest pain, palpitations, orthopnea and leg swelling. Gastrointestinal: Negative for abdominal pain, blood in stool, constipation, diarrhea, heartburn, nausea and vomiting. Genitourinary: Negative for dysuria, frequency and hematuria. Musculoskeletal: Positive for joint swelling (left shoulder) and neck stiffness. Negative for back pain, neck pain and stiffness. Skin: Negative for rash. Neurological: Negative for dizziness, tingling, tremors, seizures, weakness, numbness and headaches. Hematological: Does not bruise/bleed easily. Psychiatric/Behavioral: Positive for dysphoric mood. Negative for suicidal ideas. The patient has insomnia (started about 1 week). The patient is not nervous/anxious. Physical Exam:     Vitals:  /88   Pulse 94   Temp 97.8 °F (36.6 °C) (Oral)   Ht 5' 4\" (1.626 m)   Wt 259 lb (117.5 kg)   SpO2 97%   BMI 44.46 kg/m²       Physical Exam   Constitutional: She is oriented to person, place, and time. Vital signs are normal. She appears well-developed and well-nourished. HENT:   Head: Normocephalic.    Right Ear: Hearing, tympanic membrane and external ear normal.   Left Ear: Hearing, tympanic membrane and external ear normal.   Nose: Nose normal.   Mouth/Throat: Uvula is midline, oropharynx is clear and moist and mucous membranes are normal.   Eyes: Pupils are equal, round, and reactive to light. Conjunctivae and EOM are normal.   Neck: Trachea normal and normal range of motion. Carotid bruit is not present. No thyroid mass present. Cardiovascular: Normal rate, regular rhythm, S1 normal, S2 normal, normal heart sounds and intact distal pulses. Pulses:       Dorsalis pedis pulses are 2+ on the right side, and 2+ on the left side. Pulmonary/Chest: Effort normal and breath sounds normal.   Abdominal: Soft. Normal appearance. There is no tenderness. Musculoskeletal:        Left shoulder: She exhibits decreased range of motion, bony tenderness and pain. Lumbar back: She exhibits bony tenderness and pain. She exhibits no tenderness. +Hawkin  +Apley    Lumbar spine TTP around L4,L5. Slight radiating pain down both legs into the buttocks. Neg straight leg raised test.     No tenderness with palpation to multiple points in chest, arms, mid-back, and upper back. Neurological: She is alert and oriented to person, place, and time. She has normal strength and normal reflexes. GCS eye subscore is 4. GCS verbal subscore is 5. GCS motor subscore is 6. Skin: Skin is warm, dry and intact. No rash noted. Psychiatric: She has a normal mood and affect. Her speech is normal and behavior is normal. Judgment and thought content normal. Cognition and memory are normal.   Nursing note and vitals reviewed.             Data:     Lab Results   Component Value Date     12/19/2018    K 4.4 12/19/2018     12/19/2018    CO2 25 12/19/2018    BUN 22 12/19/2018    CREATININE 0.70 12/19/2018    GLUCOSE 133 12/19/2018    PROT 7.4 12/19/2018    LABALBU 4.2 12/19/2018    BILITOT 0.34 12/19/2018    ALKPHOS 126 12/19/2018    AST 74 12/19/2018 ALT 79 12/19/2018     Lab Results   Component Value Date    WBC 8.8 12/19/2018    RBC 5.81 12/19/2018    HGB 14.6 12/19/2018    HCT 46.6 12/19/2018    MCV 80.3 12/19/2018    MCH 25.1 12/19/2018    MCHC 31.3 12/19/2018    RDW 14.5 12/19/2018     12/19/2018    MPV NOT REPORTED 12/19/2018     No results found for: TSH  No results found for: CHOL, HDL, PSA, LABA1C       Assessment & Plan        Diagnosis Orders   1. Lumbar radiculopathy  --I disagree with previous dx of fibromyalgia. Gabapentin not helping posterior leg pain. Will d/c gabapentin. Pt has lumbar radiculopathy without sciatica (straight leg raised test neg). Will start on diclofenac TID and send to PT. [facility]   2. Gastroesophageal reflux disease without esophagitis  --symptoms well controlled with PPI. Continue to diet modify. CBC Auto Differential    Comprehensive Metabolic Panel    Lipid Panel   3. Dysfunction of left rotator cuff  --will send to PT and xray left shoulder. [facility]   4. Essential hypertension  --well controlled at this time. Continue same medications. CBC Auto Differential    Comprehensive Metabolic Panel    Lipid Panel   5. Mild episode of recurrent major depressive disorder ([address]  --pt still struggling with depression and irritability. Pt is maxed out on SSRI. Will add abilify as an adjunct to SSRI. 6. Class 3 severe obesity due to excess calories without serious comorbidity with body mass index (BMI) of 40.0 to 44.9 in adult [facility])  --pt counseled about diet and exercise. 7. Pure hypercholesterolemia  --stable on statin, not having any side effects. Continue statin. 8. Vitamin D deficiency  --pt has been taking Vit D supplement 2000 units daily. Will check Vit D level and reevaluate. Vitamin D 25 Hydroxy     Patient verbalizes understanding and agreement with plan. All questions answered. If symptoms do not resolve or worsen, return to office. Completed Refills   Requested Prescriptions     Signed Prescriptions Disp Refills    lisinopril (PRINIVIL;ZESTRIL) 40 MG tablet 30 tablet 5     Sig: Take 1 tablet by mouth daily    Cholecalciferol (VITAMIN D) 2000 units TABS tablet 30 tablet 5     Sig: Take 1 tablet by mouth daily    cyclobenzaprine (FLEXERIL) 10 MG tablet 30 tablet 5     Sig: Take 1 tablet by mouth nightly as needed for Muscle spasms    sertraline (ZOLOFT) 100 MG tablet 30 tablet 5     Sig: Take 1 tablet by mouth daily    simvastatin (ZOCOR) 40 MG tablet 30 tablet 5     Sig: Take 1 tablet by mouth nightly    loratadine (CLARITIN) 10 MG tablet 30 tablet 5     Sig: Take 1 tablet by mouth daily    omeprazole (PRILOSEC) 20 MG delayed release capsule 30 capsule 5     Sig: Take 1 capsule by mouth daily    diclofenac (VOLTAREN) 50 MG EC tablet 90 tablet 2     Sig: Take 1 tablet by mouth 3 times daily (with meals)    ARIPiprazole (ABILIFY) 5 MG tablet 30 tablet 0     Sig: Take 1 tablet by mouth daily     Return in about 1 month (around 5/23/2019) for shoulder.      Orders Placed This Encounter   Medications    lisinopril (PRINIVIL;ZESTRIL) 40 MG tablet     Sig: Take 1 tablet by mouth daily     Dispense:  30 tablet     Refill:  5    Cholecalciferol (VITAMIN D) 2000 units TABS tablet     Sig: Take 1 tablet by mouth daily     Dispense:  30 tablet     Refill:  5    cyclobenzaprine (FLEXERIL) 10 MG tablet     Sig: Take 1 tablet by mouth nightly as needed for Muscle spasms     Dispense:  30 tablet     Refill:  5    sertraline (ZOLOFT) 100 MG tablet     Sig: Take 1 tablet by mouth daily     Dispense:  30 tablet     Refill:  5    simvastatin (ZOCOR) 40 MG tablet     Sig: Take 1 tablet by mouth nightly     Dispense:  30 tablet     Refill:  5    loratadine (CLARITIN) 10 MG tablet     Sig: Take 1 tablet by mouth daily     Dispense:  30 tablet     Refill:  5    omeprazole (PRILOSEC) 20 MG delayed release capsule     Sig: Take 1 capsule by mouth daily     Dispense:  30 capsule     Refill:  5    diclofenac (VOLTAREN) 50 MG EC tablet     Sig: Take 1 tablet by mouth 3 times daily (with meals)     Dispense:  90 tablet     Refill:  2    ARIPiprazole (ABILIFY) 5 MG tablet     Sig: Take 1 tablet by mouth daily     Dispense:  30 tablet     Refill:  0     Orders Placed This Encounter   Procedures    Vitamin D 25 Hydroxy     Standing Status:   Future     Standing Expiration Date:   4/25/2020    CBC Auto Differential     Standing Status:   Future     Standing Expiration Date:   5/29/2020    Comprehensive Metabolic Panel     Standing Status:   Future     Standing Expiration Date:   5/29/2020    Lipid Panel     Standing Status:   Future     Standing Expiration Date:   5/29/2020     Order Specific Question:   Is Patient Fasting?/# of Hours     Answer:   [id number] [facility] [place]     Referral Priority:   Routine     Referral Type:   Eval and Treat     Referral Reason:   Specialty Services Required     Requested Specialty:   Physical Therapy     Number of Visits Requested:   1         Patient Instructions     SURVEY:    You may be receiving a survey from [email or web address] regarding your visit today. Please complete the survey to enable us to provide the highest quality of care to you and your family. If you cannot score us a very good on any question, please call the office to discuss how we could have made your experience a very good one. Thank you.       Electronically signed by TOSHA Edge CNP on 4/25/2019 at 5:32 PM           Completed Refills      Requested Prescriptions     Signed Prescriptions Disp Refills    lisinopril (PRINIVIL;ZESTRIL) 40 MG tablet 30 tablet 5     Sig: Take 1 tablet by mouth daily    Cholecalciferol (VITAMIN D) 2000 units TABS tablet 30 tablet 5     Sig: Take 1 tablet by mouth daily    cyclobenzaprine (FLEXERIL) 10 MG tablet 30 tablet 5     Sig: Take 1 tablet by mouth nightly as needed for Muscle spasms    sertraline (ZOLOFT) 100 MG tablet 30 tablet 5     Sig: Take 1 tablet by mouth daily    simvastatin (ZOCOR) 40 MG tablet 30 tablet 5     Sig: Take 1 tablet by mouth nightly    loratadine (CLARITIN) 10 MG tablet 30 tablet 5     Sig: Take 1 tablet by mouth daily    omeprazole (PRILOSEC) 20 MG delayed release capsule 30 capsule 5     Sig: Take 1 capsule by mouth daily    diclofenac (VOLTAREN) 50 MG EC tablet 90 tablet 2     Sig: Take 1 tablet by mouth 3 times daily (with meals)    ARIPiprazole (ABILIFY) 5 MG tablet 30 tablet 0     Sig: Take 1 tablet by mouth daily         Breanne received counseling on the following healthy behaviors: nutrition, exercise and medication adherence  Reviewed prior labs and health maintenance. Continue current medications, diet and exercise. Discussed use, benefit, and side effects of prescribed medications. Barriers to medication compliance addressed. Patient given educational materials - see patient instructions. All patient questions answered. Patient voiced understanding.

## 2019-04-25 NOTE — PATIENT INSTRUCTIONS
SURVEY:    You may be receiving a survey from [employer] regarding your visit today. Please complete the survey to enable us to provide the highest quality of care to you and your family. If you cannot score us a very good on any question, please call the office to discuss how we could have made your experience a very good one. Thank you.

## 2019-04-26 ENCOUNTER — TELEPHONE (OUTPATIENT)
Dept: FAMILY MEDICINE CLINIC | Age: 49
End: 2019-04-26

## 2019-05-02 ENCOUNTER — TELEPHONE (OUTPATIENT)
Dept: FAMILY MEDICINE CLINIC | Age: 49
End: 2019-05-02

## 2019-05-02 ENCOUNTER — HOSPITAL ENCOUNTER (OUTPATIENT)
Age: 49
Discharge: HOME | End: 2019-05-02
Payer: COMMERCIAL

## 2019-05-02 DIAGNOSIS — E55.9 VITAMIN D DEFICIENCY: ICD-10-CM

## 2019-05-02 DIAGNOSIS — R73.01 IMPAIRED FASTING GLUCOSE: ICD-10-CM

## 2019-05-02 DIAGNOSIS — R73.01 IMPAIRED FASTING GLUCOSE: Primary | ICD-10-CM

## 2019-05-02 DIAGNOSIS — K21.9 GASTROESOPHAGEAL REFLUX DISEASE WITHOUT ESOPHAGITIS: ICD-10-CM

## 2019-05-02 DIAGNOSIS — I10 ESSENTIAL HYPERTENSION: ICD-10-CM

## 2019-05-02 LAB
ABSOLUTE EOS #: 0.2 K/UL (ref 0–0.4)
ABSOLUTE IMMATURE GRANULOCYTE: NORMAL K/UL (ref 0–0.3)
ABSOLUTE LYMPH #: 1.8 K/UL (ref 1–4.8)
ABSOLUTE MONO #: 0.2 K/UL (ref 0–1)
ALBUMIN SERPL-MCNC: 4.1 G/DL (ref 3.5–5.2)
ALBUMIN/GLOBULIN RATIO: ABNORMAL (ref 1–2.5)
ALP BLD-CCNC: 111 U/L (ref 35–104)
ALT SERPL-CCNC: 56 U/L (ref 5–33)
ANION GAP SERPL CALCULATED.3IONS-SCNC: 10 MMOL/L (ref 9–17)
AST SERPL-CCNC: 69 U/L
BASOPHILS # BLD: 0 % (ref 0–2)
BASOPHILS ABSOLUTE: 0 K/UL (ref 0–0.2)
BILIRUB SERPL-MCNC: 0.19 MG/DL (ref 0.3–1.2)
BUN BLDV-MCNC: 14 MG/DL (ref 6–20)
BUN/CREAT BLD: 17 (ref 9–20)
CALCIUM SERPL-MCNC: 9.7 MG/DL (ref 8.6–10.4)
CHLORIDE BLD-SCNC: 105 MMOL/L (ref 98–107)
CHOLESTEROL/HDL RATIO: 3.8
CHOLESTEROL: 168 MG/DL
CO2: 26 MMOL/L (ref 20–31)
CREAT SERPL-MCNC: 0.81 MG/DL (ref 0.5–0.9)
DIFFERENTIAL TYPE: YES
EOSINOPHILS RELATIVE PERCENT: 3 % (ref 0–5)
ESTIMATED AVERAGE GLUCOSE: 140 MG/DL
GFR AFRICAN AMERICAN: >60 ML/MIN
GFR NON-AFRICAN AMERICAN: >60 ML/MIN
GFR SERPL CREATININE-BSD FRML MDRD: ABNORMAL ML/MIN/{1.73_M2}
GFR SERPL CREATININE-BSD FRML MDRD: ABNORMAL ML/MIN/{1.73_M2}
GLUCOSE BLD-MCNC: 130 MG/DL (ref 70–99)
HBA1C MFR BLD: 6.5 % (ref 4.8–5.9)
HCT VFR BLD CALC: 40.7 % (ref 36–46)
HDLC SERPL-MCNC: 44 MG/DL
HEMOGLOBIN: 13.3 G/DL (ref 12–16)
IMMATURE GRANULOCYTES: NORMAL %
LDL CHOLESTEROL: 97 MG/DL (ref 0–130)
LYMPHOCYTES # BLD: 32 % (ref 15–40)
MCH RBC QN AUTO: 26.2 PG (ref 26–34)
MCHC RBC AUTO-ENTMCNC: 32.6 G/DL (ref 31–37)
MCV RBC AUTO: 80.2 FL (ref 80–100)
MONOCYTES # BLD: 4 % (ref 4–8)
NRBC AUTOMATED: NORMAL PER 100 WBC
PATIENT FASTING?: YES
PDW BLD-RTO: 15.2 % (ref 12.1–15.2)
PLATELET # BLD: 201 K/UL (ref 140–450)
PLATELET ESTIMATE: NORMAL
PMV BLD AUTO: NORMAL FL (ref 6–12)
POTASSIUM SERPL-SCNC: 4.5 MMOL/L (ref 3.7–5.3)
RBC # BLD: 5.08 M/UL (ref 4–5.2)
RBC # BLD: NORMAL 10*6/UL
SEG NEUTROPHILS: 61 % (ref 47–75)
SEGMENTED NEUTROPHILS ABSOLUTE COUNT: 3.5 K/UL (ref 2.5–7)
SODIUM BLD-SCNC: 141 MMOL/L (ref 135–144)
TOTAL PROTEIN: 7 G/DL (ref 6.4–8.3)
TRIGL SERPL-MCNC: 136 MG/DL
VITAMIN D 25-HYDROXY: 40.1 NG/ML (ref 30–100)
VLDLC SERPL CALC-MCNC: NORMAL MG/DL (ref 1–30)
WBC # BLD: 5.7 K/UL (ref 3.5–11)
WBC # BLD: NORMAL 10*3/UL

## 2019-05-02 PROCEDURE — 85025 COMPLETE CBC W/AUTO DIFF WBC: CPT

## 2019-05-02 PROCEDURE — 82306 VITAMIN D 25 HYDROXY: CPT

## 2019-05-02 PROCEDURE — 36415 COLL VENOUS BLD VENIPUNCTURE: CPT

## 2019-05-02 PROCEDURE — 83036 HEMOGLOBIN GLYCOSYLATED A1C: CPT

## 2019-05-02 PROCEDURE — 80053 COMPREHEN METABOLIC PANEL: CPT

## 2019-05-02 PROCEDURE — 80061 LIPID PANEL: CPT

## 2019-05-03 ENCOUNTER — TELEPHONE (OUTPATIENT)
Dept: FAMILY MEDICINE CLINIC | Age: 49
End: 2019-05-03

## 2019-05-03 NOTE — TELEPHONE ENCOUNTER
Last OV 4/25/19 for left shoulder pain  You referred her to PT, she said she called there yesterday and waiting to hear back. Told her to call again today. I do see where she is scheduled for PT on 5/9/19  She wants something for her shoulder pain, she said it is really bad. She is taking diclofenac 50mg tid, she said this is not helping her. I did tell her you ordered PT to help with her discomfort.    Please advise  NKDA  Also, I did schedule her an apt to discuss her new DM on 5/8/19

## 2019-05-08 ENCOUNTER — OFFICE VISIT (OUTPATIENT)
Dept: FAMILY MEDICINE CLINIC | Age: 49
End: 2019-05-08
Payer: COMMERCIAL

## 2019-05-08 VITALS
DIASTOLIC BLOOD PRESSURE: 108 MMHG | WEIGHT: 259 LBS | SYSTOLIC BLOOD PRESSURE: 158 MMHG | OXYGEN SATURATION: 98 % | HEIGHT: 64 IN | HEART RATE: 84 BPM | BODY MASS INDEX: 44.22 KG/M2

## 2019-05-08 DIAGNOSIS — E11.8 TYPE 2 DIABETES MELLITUS WITH COMPLICATION, WITHOUT LONG-TERM CURRENT USE OF INSULIN (HCC): Primary | ICD-10-CM

## 2019-05-08 DIAGNOSIS — M67.912 DYSFUNCTION OF LEFT ROTATOR CUFF: ICD-10-CM

## 2019-05-08 PROCEDURE — G8427 DOCREV CUR MEDS BY ELIG CLIN: HCPCS | Performed by: NURSE PRACTITIONER

## 2019-05-08 PROCEDURE — 3017F COLORECTAL CA SCREEN DOC REV: CPT | Performed by: NURSE PRACTITIONER

## 2019-05-08 PROCEDURE — 2022F DILAT RTA XM EVC RTNOPTHY: CPT | Performed by: NURSE PRACTITIONER

## 2019-05-08 PROCEDURE — 3044F HG A1C LEVEL LT 7.0%: CPT | Performed by: NURSE PRACTITIONER

## 2019-05-08 PROCEDURE — G8417 CALC BMI ABV UP PARAM F/U: HCPCS | Performed by: NURSE PRACTITIONER

## 2019-05-08 PROCEDURE — 99214 OFFICE O/P EST MOD 30 MIN: CPT | Performed by: NURSE PRACTITIONER

## 2019-05-08 PROCEDURE — 1036F TOBACCO NON-USER: CPT | Performed by: NURSE PRACTITIONER

## 2019-05-08 RX ORDER — METFORMIN HYDROCHLORIDE 500 MG/1
500 TABLET, EXTENDED RELEASE ORAL
Qty: 30 TABLET | Refills: 2 | Status: SHIPPED | OUTPATIENT
Start: 2019-05-08 | End: 2019-07-22 | Stop reason: SDUPTHER

## 2019-05-08 RX ORDER — GLUCOSAMINE HCL/CHONDROITIN SU 500-400 MG
CAPSULE ORAL
Qty: 100 STRIP | Refills: 1 | Status: SHIPPED | OUTPATIENT
Start: 2019-05-08 | End: 2019-05-20 | Stop reason: SDUPTHER

## 2019-05-08 RX ORDER — LANCETS 30 GAUGE
EACH MISCELLANEOUS
Qty: 100 EACH | Refills: 3 | Status: SHIPPED | OUTPATIENT
Start: 2019-05-08 | End: 2020-02-06 | Stop reason: SDUPTHER

## 2019-05-08 RX ORDER — HYDROCHLOROTHIAZIDE 25 MG/1
25 TABLET ORAL EVERY MORNING
Qty: 30 TABLET | Refills: 0 | Status: SHIPPED | OUTPATIENT
Start: 2019-05-08 | End: 2019-07-22 | Stop reason: ALTCHOICE

## 2019-05-08 ASSESSMENT — ENCOUNTER SYMPTOMS
VOMITING: 0
DIARRHEA: 0
COUGH: 0
NAUSEA: 0
SHORTNESS OF BREATH: 0

## 2019-05-08 NOTE — PATIENT INSTRUCTIONS
SURVEY:    You may be receiving a survey from [employer] regarding your visit today. Please complete the survey to enable us to provide the highest quality of care to you and your family. If you cannot score us a very good on any question, please call the office to discuss how we could of made your experience a very good one. Thank you. Patient Education        Learning About Diabetes Food Guidelines  Your Care Instructions    Meal planning is important to manage diabetes. It helps keep your blood sugar at a target level (which you set with your doctor). You don't have to eat special foods. You can eat what your family eats, including sweets once in a while. But you do have to pay attention to how often you eat and how much you eat of certain foods. You may want to work with a dietitian or a certified diabetes educator (CDE) to help you plan meals and snacks. A dietitian or CDE can also help you lose weight if that is one of your goals. What should you know about eating carbs? Managing the amount of carbohydrate (carbs) you eat is an important part of healthy meals when you have diabetes. Carbohydrate is found in many foods. · Learn which foods have carbs. And learn the amounts of carbs in different foods. ? Bread, cereal, pasta, and rice have about 15 grams of carbs in a serving. A serving is 1 slice of bread (1 ounce), ½ cup of cooked cereal, or 1/3 cup of cooked pasta or rice. ? Fruits have 15 grams of carbs in a serving. A serving is 1 small fresh fruit, such as an apple or orange; ½ of a banana; ½ cup of cooked or canned fruit; ½ cup of fruit juice; 1 cup of melon or raspberries; or 2 tablespoons of dried fruit. ? Milk and no-sugar-added yogurt have 15 grams of carbs in a serving. A serving is 1 cup of milk or 2/3 cup of no-sugar-added yogurt. ? Starchy vegetables have 15 grams of carbs in a serving.  A serving is ½ cup of mashed potatoes or sweet potato; 1 cup winter squash; ½ of a small baked potato; ½ cup of cooked beans; or ½ cup cooked corn or green peas. · Learn how much carbs to eat each day and at each meal. A dietitian or CDE can teach you how to keep track of the amount of carbs you eat. This is called carbohydrate counting. · If you are not sure how to count carbohydrate grams, use the Plate Method to plan meals. It is a good, quick way to make sure that you have a balanced meal. It also helps you spread carbs throughout the day. ? Divide your plate by types of foods. Put non-starchy vegetables on half the plate, meat or other protein food on one-quarter of the plate, and a grain or starchy vegetable in the final quarter of the plate. To this you can add a small piece of fruit and 1 cup of milk or yogurt, depending on how many carbs you are supposed to eat at a meal.  · Try to eat about the same amount of carbs at each meal. Do not \"save up\" your daily allowance of carbs to eat at one meal.  · Proteins have very little or no carbs per serving. Examples of proteins are beef, chicken, turkey, fish, eggs, tofu, cheese, cottage cheese, and peanut butter. A serving size of meat is 3 ounces, which is about the size of a deck of cards. Examples of meat substitute serving sizes (equal to 1 ounce of meat) are 1/4 cup of cottage cheese, 1 egg, 1 tablespoon of peanut butter, and ½ cup of tofu. How can you eat out and still eat healthy? · Learn to estimate the serving sizes of foods that have carbohydrate. If you measure food at home, it will be easier to estimate the amount in a serving of restaurant food. · If the meal you order has too much carbohydrate (such as potatoes, corn, or baked beans), ask to have a low-carbohydrate food instead. Ask for a salad or green vegetables. · If you use insulin, check your blood sugar before and after eating out to help you plan how much to eat in the future. · If you eat more carbohydrate at a meal than you had planned, take a walk or do other exercise. This will help lower your blood sugar. What else should you know? · Limit saturated fat, such as the fat from meat and dairy products. This is a healthy choice because people who have diabetes are at higher risk of heart disease. So choose lean cuts of meat and nonfat or low-fat dairy products. Use olive or canola oil instead of butter or shortening when cooking. · Don't skip meals. Your blood sugar may drop too low if you skip meals and take insulin or certain medicines for diabetes. · Check with your doctor before you drink alcohol. Alcohol can cause your blood sugar to drop too low. Alcohol can also cause a bad reaction if you take certain diabetes medicines. Follow-up care is a key part of your treatment and safety. Be sure to make and go to all appointments, and call your doctor if you are having problems. It's also a good idea to know your test results and keep a list of the medicines you take. Where can you learn more? Go to https://[employer]. org and sign in to your [employer] account. Enter M359 in the [employer] box to learn more about \"Learning About Diabetes Food Guidelines. \"     If you do not have an account, please click on the \"Sign Up Now\" link. Current as of: July 25, 2018  Content Version: 12.0  © 5485-3053 Healthwise, Incorporated. Care instructions adapted under license by [facility] ([facility]). If you have questions about a medical condition or this instruction, always ask your healthcare professional. [place] [address] any warranty or liability for your use of this information. Patient Education        Learning About Meal Planning for Diabetes  Why plan your meals? Meal planning can be a key part of managing diabetes. Planning meals and snacks with the right balance of carbohydrate, protein, and fat can help you keep your blood sugar at the target level you set with your doctor. You don't have to eat special foods.  You can eat what your starchy vegetables such as potatoes and corn, grains such as rice and pasta, and milk and yogurt. Spreading carbohydrate throughout the day helps keep your blood sugar levels within your target range. Your daily amount depends on several things, including your weight, how active you are, which diabetes medicines you take, and what your goals are for your blood sugar levels. A registered dietitian or diabetes educator can help you plan how much carbohydrate to include in each meal and snack. A guideline for your daily amount of carbohydrate is:  · 45 to 60 grams at each meal. That's about the same as 3 to 4 carbohydrate servings. · 15 to 20 grams at each snack. That's about the same as 1 carbohydrate serving. The Nutrition Facts label on packaged foods tells you how much carbohydrate is in a serving of the food. First, look at the serving size on the food label. Is that the amount you eat in a serving? All of the nutrition information on a food label is based on that serving size. So if you eat more or less than that, you'll need to adjust the other numbers. Total carbohydrate is the next thing you need to look for on the label. If you count carbohydrate servings, one serving of carbohydrate is 15 grams. For foods that don't come with labels, such as fresh fruits and vegetables, you'll need a guide that lists carbohydrate in these foods. Ask your doctor, dietitian, or diabetes educator about books or other nutrition guides you can use. If you take insulin, you need to know how many grams of carbohydrate are in a meal. This lets you know how much rapid-acting insulin to take before you eat. If you use an insulin pump, you get a constant rate of insulin during the day. So the pump must be programmed at meals to give you extra insulin to cover the rise in blood sugar after meals. When you know how much carbohydrate you will eat, you can take the right amount of insulin.  Or, if you always use the same amount of insulin, you need to make sure that you eat the same amount of carbohydrate at meals. If you need more help to understand carbohydrate counting and food labels, ask your doctor, dietitian, or diabetes educator. How do you get started with meal planning? Here are some tips to get started:  · Plan your meals a week at a time. Don't forget to include snacks too. · Use cookbooks or online recipes to plan several main meals. Plan some quick meals for busy nights. You also can double some recipes that freeze well. Then you can save half for other busy nights when you don't have time to cook. · Make sure you have the ingredients you need for your recipes. If you're running low on basic items, put these items on your shopping list too. · List foods that you use to make breakfasts, lunches, and snacks. List plenty of fruits and vegetables. · Post this list on the refrigerator. Add to it as you think of more things you need. · Take the list to the store to do your weekly shopping. Follow-up care is a key part of your treatment and safety. Be sure to make and go to all appointments, and call your doctor if you are having problems. It's also a good idea to know your test results and keep a list of the medicines you take. Where can you learn more? Go to https://[employer]. org and sign in to your [employer] account. Enter X525 in the [facility] box to learn more about \"Learning About Meal Planning for Diabetes. \"     If you do not have an account, please click on the \"Sign Up Now\" link. Current as of: July 25, 2018  Content Version: 12.0  © 3927-7273 Healthwise, Incorporated. Care instructions adapted under license by [facility] ([facility]). If you have questions about a medical condition or this instruction, always ask your healthcare professional. [address] any warranty or liability for your use of this information.

## 2019-05-08 NOTE — PROGRESS NOTES
HPI Notes    Name: Laquita Hong  : 1970         Chief Complaint:     Chief Complaint   Patient presents with    Diabetes     go over labs    Shoulder Pain     right       History of Present Illness:        Diabetes   She presents for her follow-up diabetic visit. She has type 2 diabetes mellitus. There are no hypoglycemic associated symptoms. Pertinent negatives for diabetes include no chest pain, no polydipsia, no polyphagia and no polyuria. There are no hypoglycemic complications. Symptoms are stable. There are no diabetic complications. Risk factors for coronary artery disease include diabetes mellitus, hypertension and obesity. When asked about current treatments, none (new onset) were reported. She is following a generally unhealthy diet. An ACE inhibitor/angiotensin II receptor blocker is being taken. She does not see a podiatrist.Eye exam is not current. Shoulder Pain    The pain is present in the left shoulder and right shoulder. This is a new problem. The current episode started 1 to 4 weeks ago (2 weeks ago). There has been no history of extremity trauma. The quality of the pain is described as aching. The pain is moderate. Associated symptoms include a limited range of motion (joint popping). Pertinent negatives include no fever. The symptoms are aggravated by activity (brushing hair). The treatment provided mild relief.        Past Medical History:     Past Medical History:   Diagnosis Date    Anemia     Depression     Hyperlipidemia     Hypertension       Reviewed all health maintenance requirements and ordered appropriate tests  Health Maintenance Due   Topic Date Due    Pneumococcal 0-64 years Vaccine (1 of 1 - PPSV23) 02/15/1975    Diabetic foot exam  02/15/1979    Diabetic retinal exam  02/15/1979    HIV screen  02/15/1984    Diabetic microalbuminuria test  02/15/1987    Hepatitis B Vaccine (1 of 3 - Risk 3-dose series) 02/15/1988    DTaP/Tdap/Td vaccine (1 - Tdap) 02/15/1988    Cervical cancer screen  02/15/1990    Breast cancer screen  02/15/2019    Shingles Vaccine (1 of 2) 02/15/2019    Colon cancer screen colonoscopy  02/15/2019       Past Surgical History:     Past Surgical History:   Procedure Laterality Date    CHOLECYSTECTOMY      HYSTERECTOMY      TONSILLECTOMY          Medications:       Prior to Admission medications    Medication Sig Start Date End Date Taking?  Authorizing Provider   blood glucose monitor kit and supplies Check daily 5/8/19  Yes TOSHA Kirby CNP   blood glucose monitor strips Check daily 5/8/19  Yes TOSHA Kirby CNP   Lancets MISC Check blood sugar daily 5/8/19  Yes TOSHA Kirby CNP   metFORMIN (GLUCOPHAGE-XR) 500 MG extended release tablet Take 1 tablet by mouth daily (with breakfast) 5/8/19  Yes TOSHA Kirby CNP   hydrochlorothiazide (HYDRODIURIL) 25 MG tablet Take 1 tablet by mouth every morning 5/8/19  Yes TOSHA Kirby CNP   lisinopril (PRINIVIL;ZESTRIL) 40 MG tablet Take 1 tablet by mouth daily 4/25/19  Yes TOSHA Kirby CNP   Cholecalciferol (VITAMIN D) 2000 units TABS tablet Take 1 tablet by mouth daily 4/25/19  Yes TOSHA Kirby CNP   cyclobenzaprine (FLEXERIL) 10 MG tablet Take 1 tablet by mouth nightly as needed for Muscle spasms 4/25/19  Yes TOSHA Kirby CNP   sertraline (ZOLOFT) 100 MG tablet Take 1 tablet by mouth daily 4/25/19  Yes TOSHA Kirby CNP   simvastatin (ZOCOR) 40 MG tablet Take 1 tablet by mouth nightly 4/25/19  Yes TOSHA Kirby CNP   loratadine (CLARITIN) 10 MG tablet Take 1 tablet by mouth daily 4/25/19  Yes TOSHA Kirby CNP   omeprazole (PRILOSEC) 20 MG delayed release capsule Take 1 capsule by mouth daily 4/25/19  Yes TOSHA Kirby CNP   diclofenac (VOLTAREN) 50 MG EC tablet Take 1 tablet by mouth 3 times daily (with meals) 4/25/19  Yes Macey Giordano PRISCILLA ZeeN - CNP   Multiple Vitamin (ONE DAILY ESSENTIAL PO) Take 1 tablet by mouth daily   Yes Historical Provider, MD        Allergies:       Patient has no known allergies. Social History:     Tobacco:    reports that she has never smoked. She has never used smokeless tobacco.  Alcohol:      reports that she does not drink alcohol. Drug Use:  reports that she does not use drugs. Family History:      No family history on file. Review of Systems:         Review of Systems   Constitutional: Negative for chills and fever. Respiratory: Negative for cough and shortness of breath. Cardiovascular: Negative for chest pain and palpitations. Gastrointestinal: Negative for diarrhea, nausea and vomiting. Endocrine: Negative for polydipsia, polyphagia and polyuria. Musculoskeletal: Positive for joint swelling. Physical Exam:     Vitals:  BP (!) 158/108   Pulse 84   Ht 5' 4\" (1.626 m)   Wt 259 lb (117.5 kg)   SpO2 98%   BMI 44.46 kg/m²       Physical Exam   Constitutional: She is oriented to person, place, and time. She appears well-developed and well-nourished. Cardiovascular: Normal rate, regular rhythm, S1 normal, S2 normal and normal heart sounds. Pulmonary/Chest: Effort normal and breath sounds normal. No respiratory distress. Musculoskeletal:        Right shoulder: She exhibits normal range of motion, no tenderness and no bony tenderness. Left shoulder: She exhibits decreased range of motion, tenderness, bony tenderness and pain. +Hawkin test  +Apley scratch test   Neurological: She is alert and oriented to person, place, and time. Skin: Skin is warm and dry. Nursing note and vitals reviewed.             Data:     Lab Results   Component Value Date     05/02/2019    K 4.5 05/02/2019     05/02/2019    CO2 26 05/02/2019    BUN 14 05/02/2019    CREATININE 0.81 05/02/2019    GLUCOSE 130 05/02/2019    PROT 7.0 05/02/2019    LABALBU 4.1 05/02/2019    BILITOT 0.19 05/02/2019    ALKPHOS 111 05/02/2019    AST 69 05/02/2019    ALT 56 05/02/2019     Lab Results   Component Value Date    WBC 5.7 05/02/2019    RBC 5.08 05/02/2019    HGB 13.3 05/02/2019    HCT 40.7 05/02/2019    MCV 80.2 05/02/2019    MCH 26.2 05/02/2019    MCHC 32.6 05/02/2019    RDW 15.2 05/02/2019     05/02/2019    MPV NOT REPORTED 05/02/2019     No results found for: TSH  Lab Results   Component Value Date    CHOL 168 05/02/2019    HDL 44 05/02/2019    LABA1C 6.5 05/02/2019          Assessment & Plan        Diagnosis Orders   1. Type 2 diabetes mellitus with complication, without long-term current use of insulin ([facility] [address]  --pt extensively educated about disease process. Pt educated about carbohydrate control and limiting simple sugars. Patient educated about metformin and mechanism of action. Patient educated about signs and symptoms of hypoglycemia. Patient educated about use of glucometer. Patient educated about the importance of controlling her diet and regular exercise as mechanisms for controlling diabetes. Patient encouraged that diabetes is manageable, however it is not curable. [id number] [facility] Diabetes Education [facility]   2. Dysfunction of left rotator cuff   --Patient is scheduled for physical therapy. Continue NSAIDs. Patient verbalizes understanding and agreement with plan. All questions answered. If symptoms do not resolve or worsen, return to office.          Completed Refills   Requested Prescriptions     Signed Prescriptions Disp Refills    blood glucose monitor kit and supplies 1 kit 0     Sig: Check daily    blood glucose monitor strips 100 strip 1     Sig: Check daily    Lancets MISC 100 each 3     Sig: Check blood sugar daily    metFORMIN (GLUCOPHAGE-XR) 500 MG extended release tablet 30 tablet 2     Sig: Take 1 tablet by mouth daily (with breakfast)    hydrochlorothiazide (HYDRODIURIL) 25 MG tablet 30 tablet 0     Sig: Take 1 tablet by mouth every morning     Return in about 3 months (around 8/8/2019) for DM. Orders Placed This Encounter   Medications    blood glucose monitor kit and supplies     Sig: Check daily     Dispense:  1 kit     Refill:  0    blood glucose monitor strips     Sig: Check daily     Dispense:  100 strip     Refill:  1    Lancets MISC     Sig: Check blood sugar daily     Dispense:  100 each     Refill:  3    metFORMIN (GLUCOPHAGE-XR) 500 MG extended release tablet     Sig: Take 1 tablet by mouth daily (with breakfast)     Dispense:  30 tablet     Refill:  2    hydrochlorothiazide (HYDRODIURIL) 25 MG tablet     Sig: Take 1 tablet by mouth every morning     Dispense:  30 tablet     Refill:  0     Orders Placed This Encounter   Procedures    [facility] Diabetes Education [place]     Referral Priority:   Routine     Referral Type:   Eval and Treat     Referral Reason:   Specialty Services Required     Number of Visits Requested:   1         Patient Instructions     SURVEY:    You may be receiving a survey from [place] [employer] regarding your visit today. Please complete the survey to enable us to provide the highest quality of care to you and your family. If you cannot score us a very good on any question, please call the office to discuss how we could of made your experience a very good one. Thank you. Patient Education        Learning About Diabetes Food Guidelines  Your Care Instructions    Meal planning is important to manage diabetes. It helps keep your blood sugar at a target level (which you set with your doctor). You don't have to eat special foods. You can eat what your family eats, including sweets once in a while. But you do have to pay attention to how often you eat and how much you eat of certain foods. You may want to work with a dietitian or a certified diabetes educator (CDE) to help you plan meals and snacks. A dietitian or CDE can also help you lose weight if that is one of your goals.   What should you know about eating carbs? Managing the amount of carbohydrate (carbs) you eat is an important part of healthy meals when you have diabetes. Carbohydrate is found in many foods. · Learn which foods have carbs. And learn the amounts of carbs in different foods. ? Bread, cereal, pasta, and rice have about 15 grams of carbs in a serving. A serving is 1 slice of bread (1 ounce), ½ cup of cooked cereal, or 1/3 cup of cooked pasta or rice. ? Fruits have 15 grams of carbs in a serving. A serving is 1 small fresh fruit, such as an apple or orange; ½ of a banana; ½ cup of cooked or canned fruit; ½ cup of fruit juice; 1 cup of melon or raspberries; or 2 tablespoons of dried fruit. ? Milk and no-sugar-added yogurt have 15 grams of carbs in a serving. A serving is 1 cup of milk or 2/3 cup of no-sugar-added yogurt. ? Starchy vegetables have 15 grams of carbs in a serving. A serving is ½ cup of mashed potatoes or sweet potato; 1 cup winter squash; ½ of a small baked potato; ½ cup of cooked beans; or ½ cup cooked corn or green peas. · Learn how much carbs to eat each day and at each meal. A dietitian or CDE can teach you how to keep track of the amount of carbs you eat. This is called carbohydrate counting. · If you are not sure how to count carbohydrate grams, use the Plate Method to plan meals. It is a good, quick way to make sure that you have a balanced meal. It also helps you spread carbs throughout the day. ? Divide your plate by types of foods. Put non-starchy vegetables on half the plate, meat or other protein food on one-quarter of the plate, and a grain or starchy vegetable in the final quarter of the plate. To this you can add a small piece of fruit and 1 cup of milk or yogurt, depending on how many carbs you are supposed to eat at a meal.  · Try to eat about the same amount of carbs at each meal. Do not \"save up\" your daily allowance of carbs to eat at one meal.  · Proteins have very little or no carbs per serving.  Examples of proteins are beef, chicken, turkey, fish, eggs, tofu, cheese, cottage cheese, and peanut butter. A serving size of meat is 3 ounces, which is about the size of a deck of cards. Examples of meat substitute serving sizes (equal to 1 ounce of meat) are 1/4 cup of cottage cheese, 1 egg, 1 tablespoon of peanut butter, and ½ cup of tofu. How can you eat out and still eat healthy? · Learn to estimate the serving sizes of foods that have carbohydrate. If you measure food at home, it will be easier to estimate the amount in a serving of restaurant food. · If the meal you order has too much carbohydrate (such as potatoes, corn, or baked beans), ask to have a low-carbohydrate food instead. Ask for a salad or green vegetables. · If you use insulin, check your blood sugar before and after eating out to help you plan how much to eat in the future. · If you eat more carbohydrate at a meal than you had planned, take a walk or do other exercise. This will help lower your blood sugar. What else should you know? · Limit saturated fat, such as the fat from meat and dairy products. This is a healthy choice because people who have diabetes are at higher risk of heart disease. So choose lean cuts of meat and nonfat or low-fat dairy products. Use olive or canola oil instead of butter or shortening when cooking. · Don't skip meals. Your blood sugar may drop too low if you skip meals and take insulin or certain medicines for diabetes. · Check with your doctor before you drink alcohol. Alcohol can cause your blood sugar to drop too low. Alcohol can also cause a bad reaction if you take certain diabetes medicines. Follow-up care is a key part of your treatment and safety. Be sure to make and go to all appointments, and call your doctor if you are having problems. It's also a good idea to know your test results and keep a list of the medicines you take. Where can you learn more? Go to https://dhara.[facility]. org and sign or meat substitutes on one-quarter of the plate. Put a grain or starchy vegetable (such as brown rice or a potato) on the final quarter of the plate. You can add a small piece of fruit and some low-fat or fat-free milk or yogurt, depending on your carbohydrate goal for each meal.  Here are some tips for using the plate format:  · Make sure that you are not using an oversized plate. A 9-inch plate is best. Many restaurants use larger plates. · Get used to using the plate format at home. Then you can use it when you eat out. · Write down your questions about using the plate format. Talk to your doctor, a dietitian, or a diabetes educator about your concerns. Carbohydrate counting  With carbohydrate counting, you plan meals based on the amount of carbohydrate in each food. Carbohydrate raises blood sugar higher and more quickly than any other nutrient. It is found in desserts, breads and cereals, and fruit. It's also found in starchy vegetables such as potatoes and corn, grains such as rice and pasta, and milk and yogurt. Spreading carbohydrate throughout the day helps keep your blood sugar levels within your target range. Your daily amount depends on several things, including your weight, how active you are, which diabetes medicines you take, and what your goals are for your blood sugar levels. A registered dietitian or diabetes educator can help you plan how much carbohydrate to include in each meal and snack. A guideline for your daily amount of carbohydrate is:  · 45 to 60 grams at each meal. That's about the same as 3 to 4 carbohydrate servings. · 15 to 20 grams at each snack. That's about the same as 1 carbohydrate serving. The Nutrition Facts label on packaged foods tells you how much carbohydrate is in a serving of the food. First, look at the serving size on the food label. Is that the amount you eat in a serving? All of the nutrition information on a food label is based on that serving size.  So if you eat more or less than that, you'll need to adjust the other numbers. Total carbohydrate is the next thing you need to look for on the label. If you count carbohydrate servings, one serving of carbohydrate is 15 grams. For foods that don't come with labels, such as fresh fruits and vegetables, you'll need a guide that lists carbohydrate in these foods. Ask your doctor, dietitian, or diabetes educator about books or other nutrition guides you can use. If you take insulin, you need to know how many grams of carbohydrate are in a meal. This lets you know how much rapid-acting insulin to take before you eat. If you use an insulin pump, you get a constant rate of insulin during the day. So the pump must be programmed at meals to give you extra insulin to cover the rise in blood sugar after meals. When you know how much carbohydrate you will eat, you can take the right amount of insulin. Or, if you always use the same amount of insulin, you need to make sure that you eat the same amount of carbohydrate at meals. If you need more help to understand carbohydrate counting and food labels, ask your doctor, dietitian, or diabetes educator. How do you get started with meal planning? Here are some tips to get started:  · Plan your meals a week at a time. Don't forget to include snacks too. · Use cookbooks or online recipes to plan several main meals. Plan some quick meals for busy nights. You also can double some recipes that freeze well. Then you can save half for other busy nights when you don't have time to cook. · Make sure you have the ingredients you need for your recipes. If you're running low on basic items, put these items on your shopping list too. · List foods that you use to make breakfasts, lunches, and snacks. List plenty of fruits and vegetables. · Post this list on the refrigerator. Add to it as you think of more things you need. · Take the list to the store to do your weekly shopping.   Follow-up care is a key part of your treatment and safety. Be sure to make and go to all appointments, and call your doctor if you are having problems. It's also a good idea to know your test results and keep a list of the medicines you take. Where can you learn more? Go to https://chpepiceweb.[employer]. org and sign in to your [employer] account. Enter F399 in the [employer] box to learn more about \"Learning About Meal Planning for Diabetes. \"     If you do not have an account, please click on the \"Sign Up Now\" link. Current as of: July 25, 2018  Content Version: 12.0  © 7443-0209 Healthwise, [employer]. Care instructions adapted under license by [facility] ([facility]). If you have questions about a medical condition or this instruction, always ask your healthcare professional. [address]  any warranty or liability for your use of this information. Electronically signed by TOSHA Juarez CNP on 5/8/2019 at 12:30 PM           Completed Refills      Requested Prescriptions     Signed Prescriptions Disp Refills    blood glucose monitor kit and supplies 1 kit 0     Sig: Check daily    blood glucose monitor strips 100 strip 1     Sig: Check daily    Lancets MISC 100 each 3     Sig: Check blood sugar daily    metFORMIN (GLUCOPHAGE-XR) 500 MG extended release tablet 30 tablet 2     Sig: Take 1 tablet by mouth daily (with breakfast)    hydrochlorothiazide (HYDRODIURIL) 25 MG tablet 30 tablet 0     Sig: Take 1 tablet by mouth every morning         Breanne received counseling on the following healthy behaviors: medication adherence  Reviewed prior labs and health maintenance. Continue current medications, diet and exercise. Discussed use, benefit, and side effects of prescribed medications. Barriers to medication compliance addressed. Patient given educational materials - see patient instructions. All patient questions answered. Patient voiced understanding.

## 2019-05-13 ENCOUNTER — HOSPITAL ENCOUNTER (OUTPATIENT)
Dept: DIABETES SERVICES | Age: 49
Setting detail: THERAPIES SERIES
Discharge: HOME | End: 2019-05-13
Payer: COMMERCIAL

## 2019-05-13 VITALS — WEIGHT: 259.6 LBS | BODY MASS INDEX: 44.32 KG/M2 | HEIGHT: 64 IN

## 2019-05-13 PROCEDURE — G0108 DIAB MANAGE TRN  PER INDIV: HCPCS

## 2019-05-13 SDOH — ECONOMIC STABILITY: FOOD INSECURITY: ADDITIONAL INFORMATION: NO

## 2019-05-13 ASSESSMENT — PROBLEM AREAS IN DIABETES QUESTIONNAIRE (PAID)
FEELING THAT DIABETES IS TAKING UP TOO MUCH OF YOUR MENTAL AND PHYSICAL ENERGY EVERY DAY: 1
FEELING SCARED WHEN YOU THINK ABOUT LIVING WITH DIABETES: 0
FEELING DEPRESSED WHEN YOU THINK ABOUT LIVING WITH DIABETES: 1
WORRYING ABOUT THE FUTURE AND THE POSSIBILITY OF SERIOUS COMPLICATIONS: 1
PAID-5 TOTAL SCORE: 4
COPING WITH COMPLICATIONS OF DIABETES: 1

## 2019-05-13 ASSESSMENT — PATIENT HEALTH QUESTIONNAIRE - PHQ9
2. FEELING DOWN, DEPRESSED OR HOPELESS: 0
1. LITTLE INTEREST OR PLEASURE IN DOING THINGS: 0
SUM OF ALL RESPONSES TO PHQ9 QUESTIONS 1 & 2: 0
SUM OF ALL RESPONSES TO PHQ QUESTIONS 1-9: 3
SUM OF ALL RESPONSES TO PHQ QUESTIONS 1-9: 3

## 2019-05-13 ASSESSMENT — SLEEP AND FATIGUE QUESTIONNAIRES
HAVE YOU BEEN TOLD, OR NOTICED ON YOUR OWN, THAT YOU STOP BREATHING OR STRUGGLE TO BREATHE IN YOUR SLEEP: NO
HOW MANY HOURS OF SLEEP ARE YOU GETTING, ON AVERAGE: LESS THAN 7
HOW DO YOU RATE THE QUALITY OF YOUR SLEEP: GOOD
HAVE YOU EVER BEEN TESTED FOR SLEEP APNEA: NO

## 2019-05-13 NOTE — PROGRESS NOTES
Diabetes Self-Management Education Record    Progress Note: Lorri Brandles here for initial visit and assessment alone, then daughter joins her for approx 25 minutes. Jaky Dumont was recently diagnosed with type 2 DM. States her father had diabetes. aJky Dumont is taking metformin  mg daily with breakfast with no missed doses. She is checking her sugar 3 times daily before meals and keeping a log. Her blood sugars have ranged . She had a blood sugar of 62 on 5-11 with \"no symptoms. \" Discussed hyper/hypoglycemia and the treatment of each. Blood sugar checked at 1145 and was 104. States she has not eaten yet today. Last night she had a  salad and a meatloaf sandwich for supper. Discussed healthy eating and carbohydrate counting. States she has almost stopped drinking regular soda and is looking for a drink that she likes. Admits she \"sneaks a sip\" of soda from her daughter occasionally. Recommended 45-60 grams of carbohydrates per meal. Appointment with dietitian not scheduled at this time. Discussed exercise and staying active as a way to manage blood sugars. Recommended 30 minutes of aerobic exercise 5 days per week. States she walks everyday for approx 1 mile. Will continue to follow and support as needed. Continued diabetes education appointment not scheduled at this time. Will continue to follow as needed.  Encouraged to call with questions or concerns        Participant Name: Rebecca Mercado   Referring Provider:  TOSHA Tobar CNP    Keys to learning:  Considerations: []Language []Emotional []Health Literacy  []Cognitive []Memory changes []Financial []Cultural   []Church []Vision []Hearing  []Speech []Lack of desire  []Literacy  []Psycho-social  [x]None  If considerations are noted, accommodations made: N/A    Identified barriers to learning/self management: None    The following information was discussed:    [x] Diabetes disease process and treatment options   [x] Healthy nutrition, carbohydrate counting, meal planning  [x] Monitoring blood glucose and other parameters; interpreting and using results  [x] Acute complications--prevention, detection and treatment  [] Medication management and safety   [x] Incorporating physical activity into lifestyle   [x] Exercise for Health, Reducing Risks for Heart Disease, Diabetes and Heart Health  [] Preventing, through risk reduction behaviors, detecting, and treating chronic complications  [] Sick Day management  [] Developing personalized strategies to address psychosocial issues and concerns  [] Developing personalized strategies to promote health and behavior change through goalsetting, behavior change strategies aimed at risk reduction  [] Special situations--disaster planning, travel, social activities    Session Assessment & Evaluation Ratings:  1=Needs Instruction  2=Needs Review  3=Comprehends Key Points  4=Demonstrates Understanding/Competency  NC=Not Covered   N/A=Not Applicable Initial  Assess    Date:  5/13/19 2nd  Visit     Date:   3rd  Visit    Date: 4th  Visit    Date: Comments  S.O.C=Stage of Change/Readiness to change:  · Pre=Pre-contemplation stage--not thinking about changing  · C=Contemplation stage--ambivalent about changing  · P=Preparation stage--prepared to made a specific change  · A=Action stage--committed to modify behaviors  · M=Maintenance and relapse prevention--incorporating new behavior   Participant Stated  Goal      Healthy eating- will try to eat 3 meals a day. Cut down on concentrated sweets. Participant Stated Goal       S. O.C  [] PRE  [] C  [] P      [x] A  [] M                    S.O.C  [] PRE  [] C  [] P      [] A  [] M     S.O.C  [] PRE  [] C  [] P      [] A  [] M                     S.O.C  [] PRE  [] C  [] P      [] A  [] M      S.O.C  [] PRE  [] C  [] P      [] A  [] M                    S.O.C  [] PRE  [] C  [] P      [] A  [] M     S.O.C  [] PRE  [] C  [] P      [] A  [] M                    S.O.C  [] PRE  [] C  [] P      [] A  [] M   Current main goal attainment frequency:   [] Never  [x] Some 5/13/19  [] Half  [] Most  [] All    Participant confidence to master goal this visit:   [x] Excellent  [] Good  [] [address]  [] Poor            Current main goal attainment frequency:   [] Never  [] Some  [] Half  [] Most  [] All    Participant confidence to master goal this visit:   [] Excellent  [] Good [] [address]  [] Poor                  Session  Topics & Learning Objectives:      Comments:   Diabetes disease process & Treatment process: Define diabetes & prediabetes; identify own type of diabetes; role of the pancreas; signs/symptoms; diagnostic criteria; prevention and treatment options; contributing factors. Rating  [x] 1  [] 2  [] 3  [] 4      [] NC  [] N/A   Rating  [] 1  [] 2  [] 3  [] 4  [] NC  [] N/A     Rating  [] 1  [] 2  [] 3  [] 4  [] NC  [] N/A     Rating  [] 1  [] 2  [] 3  [] 4  [] NC  [] N/A        5/13/19 Discussed type 2 DM and treatment. Incorporating nutritional management into lifestyle: Describe effect of type, amount & timing of food on blood glucose; Describe basic meal planning techniques & current nutrition guidelines; Correctly read food labels & demonstrate CHO counting & portion control with personalized meal plan. Rating  [x] 1  [] 2  [] 3  [] 4  [] NC  [] N/A     Rating  [] 1  [] 2  [] 3  [] 4  [] NC  [] N/A     Rating  [] 1  [] 2  [] 3  [] 4  [] NC  [] N/A     Rating  [] 1  [] 2  [] 3  [] 4  [] NC  [] N/A         5/13/19 Discussed basic carbohydrate counting. Incorporating physical activity into lifestyle:   State effect of exercise on blood glucose levels. Identifies personal exercise plan and contraindications. Discussed safety tips while exercising.             Rating  [x] 1  [] 2  [] 3  [] 4  [] NC  [] N/A     Rating  [] 1  [] 2  [] 3  [] 4  [] NC  [] N/A       Rating  [] 1  [] 2  [] 3  [] 4  [] NC  [] N/A     Rating  [] 1  [] 2  [] 3  [] 4  [] NC  [] N/A      5/13/19 Briefly discussed. Walks approx 1 mile a day. Using medications safely: State effect of diabetes medicines on diabetes;   Name diabetes medication taking, action, timing & side effects. Rating  [] 1  [] 2  [] 3  [] 4  [x] NC  [] N/A       Rating  [] 1  [] 2  [] 3  [] 4  [] NC  [] N/A         Rating  [] 1  [] 2  [] 3  [] 4  [] NC  [] N/A   Rating  [] 1  [] 2  [] 3  [] 4  [] NC  [] N/A      ________             Insulin/injectable-  Appropriate injection site; proper storage; proper technique; safe needle disposal.   Rating  [] 1  [] 2  [] 3  [] 4  [] NC  [x] N/A Rating  [] 1  [] 2  [] 3  [] 4  [] NC  [] N/A Rating  [] 1  [] 2  [] 3  [] 4  [] NC  [] N/A Rating  [] 1  [] 2  [] 3  [] 4  [] NC  [] N/A        Monitoring blood glucose, interpreting and using results: Identify recommended blood glucose targets, personal targets, A1C target, importance of logging glucose,appropriate techniques and problem solving. Safe lancet disposal.    Rating  [x] 1  [] 2  [] 3  [] 4  [] NC  [] N/A     Rating  [] 1  [] 2  [] 3  [] 4  [] NC  [] N/A       Rating  [] 1  [] 2  [] 3  [] 4  [] NC  [] N/A     Rating  [] 1  [] 2  [] 3  [] 4  [] NC  [] N/A      5/13/19 Discussed target numbers. Prevention, detection & treatment of acute complications: List symptoms of hyper- and hypoglycemia; Describe how to treat low blood sugar & actions for lowering high blood glucose levels. Prevention and treatment strategies. Rating  [x] 1  [] 2  [] 3  [] 4  [] NC  [] N/A   Rating  [] 1  [] 2  [] 3  [] 4  [] NC  [] N/A   Rating  [] 1  [] 2  [] 3  [] 4  [] NC  [] N/A Rating  [] 1  [] 2  [] 3  [] 4  [] NC  [] N/A     5/13/19 Discussed hyper/hypoglycemia and treatment. Handout given. Describe sick day guidelines and indications for physician notification.    Rating  [] 1  [] 2  [] 3  [] 4  [x] NC  [] N/A     Rating  [] 1  [] 2  [] 3  [] 4  [] NC  [] N/A     Rating  [] 1  [] 2  [] 3  [] 4  [] NC  [] N/A     Rating  [] 1  [] 2  [] 3  [] 4  [] NC  [] N/A        Prevention, detection & treatment of chronic complications: Define the natural course of diabetes & describe the relationship of blood glucose levels to long term complications of diabetes. Identify preventative measures and standard of care. Rating  [] 1  [] 2  [] 3  [] 4  [x] NC  [] N/A     Rating  [] 1  [] 2  [] 3  [] 4  [] NC  [] N/A     Rating  [] 1  [] 2  [] 3  [] 4  [] NC  [] N/A     Rating  [] 1  [] 2  [] 3  [] 4  [] NC  [] N/A      Developing strategies to address psychosocial issues: Describe feelings about living with diabetes; Identify support needed & support network. Describe how stress, depression, and anxiety affect blood glucose. Identify coping strategies. Rating  [] 1  [] 2  [] 3  [] 4  [x] NC  [] N/A       Rating  [] 1  [] 2  [] 3  [] 4  [] NC  [] N/A     Rating  [] 1  [] 2  [] 3  [] 4  [] NC  [] N/A     Rating  [] 1  [] 2  [] 3  [] 4  [] NC  [] N/A          Developing strategies to promote health/change behavior:  Define the ABCs of diabetes; Identify appropriate screenings, schedule & personal plan for screenings. Identify 7 self-care behaviors. Benefits, challenges and strategies for behavioral change. Rating  [x] 1  [] 2  [] 3  [] 4  [] N  [] N/A     Rating  [] 1  [] 2  [] 3  [] 4  [] NC  [] N/A     Rating  [] 1  [] 2  [] 3  [] 4  [] NC  [] N/A     Rating  [] 1  [] 2  [] 3  [] 4  [] NC  [] N/A               Time Spent with patient: 75 minutes  Plan      Next Appointment not scheduled. Will call to reschedule. Instruction Method: [x]Lecture/Discussion  []Power Point Presentation  []Handouts  []Return Demonstration      Education Materials/Equipment Provided:    [x]Self-Management - Initial assessment - ADA  Where do I Begin, Living with Type 2 diabetes\" book;  Diet meal planning basics, handout on diabetes education classes, hyper/hypoglycemia signs/symptoms and treatment handout; Diabetes zones;  Support plan Diabetes on pregnancy?: No  Is it important to manage Diabetes?: important  Are you ready to manage your Diabetes?: ready  What is your current diabetes support plan?: Attend diabetes support group    Monitoring  Do you check your blood sugar?: Yes     Recent blood sugar range: Log book  Number of low blood sugar results in the past month: 1  Have you experienced low blood sugar symptoms?: No  How do you treat low blood sugar symptoms?: Juice, Candy    Diet History  Do you follow a meal plan for diabetes?: No  Do you skip meals?: Yes(2-3 meals per day)  Weight history in the last three months?: Stable       Resources, Support and Activity        How many days are you active per week ?: 7  How many minutes of activity per day?: 30  Support system(s): Spouse/Significant Other    Lab/Test Results  Lab/Test Results  Weight: 259 lb 9.6 oz (117.8 kg)  Hemoglobin a1c result manual entry: 6.5  Blood glucose manual entry: 104(Checked at 1145)    PHQ-2  PHQ-2 Over the past 2 weeks, how often have you been bothered by any of the following problems? Little interest or pleasure in doing things: Not at all  Feeling down, depressed, or hopeless: Not at all  PHQ-2 Score: 0    PHQ-9  PHQ-9  Little interest or pleasure in doing things: Not at all  Feeling down, depressed, or hopeless: Not at all  Trouble falling or staying asleep, or sleeping too much: Not at all  Feeling tired or having little energy: Nearly every day  Poor appetite or overeating: Not at all  Feeling bad about yourself - or that you are a failure or have let yourself or your family down: Not at all  Trouble concentrating on things, such as reading the newspaper or watching television: Not at all  Moving or speaking so slowly that other people could have noticed.  Or the opposite - being so fidgety or restless that you have been moving around a lot more than usual: Not at all  Thoughts that you would be better off dead, or of hurting yourself in some way: Not at all  PHQ-9 Total Score: 3  If you checked off any problems, how difficult have these problems made it for you to do your work, take care of things at home, or get along with other people?: Not difficult at all    PHQ-9 Total Score  PHQ-9 Total Score  PHQ-9 Total Score: 3    PAID-5 Total Score: [address] [facility]  Diabetes clinic educator  5/13/2019  4:18 PM

## 2019-05-20 RX ORDER — GLUCOSAMINE HCL/CHONDROITIN SU 500-400 MG
CAPSULE ORAL
Qty: 100 STRIP | Refills: 1 | Status: SHIPPED | OUTPATIENT
Start: 2019-05-20 | End: 2020-02-06 | Stop reason: SDUPTHER

## 2019-05-20 NOTE — TELEPHONE ENCOUNTER
Patient states that she check her sugar 3 times a day and her RX states daily. Patient is running out of test strips. Can a RX be sent to [facility] in [facility]? Patient would like a call to let her know this was done. Patient last seen 5/08/19. Health Maintenance   Topic Date Due    Pneumococcal 0-64 years Vaccine (1 of 1 - PPSV23) 02/15/1975    Diabetic foot exam  02/15/1979    Diabetic retinal exam  02/15/1979    HIV screen  02/15/1984    Diabetic microalbuminuria test  02/15/1987    Hepatitis B Vaccine (1 of 3 - Risk 3-dose series) 02/15/1988    DTaP/Tdap/Td vaccine (1 - Tdap) 02/15/1988    Cervical cancer screen  02/15/1990    Breast cancer screen  02/15/2019    Shingles Vaccine (1 of 2) 02/15/2019    Colon cancer screen colonoscopy  02/15/2019    Flu vaccine (Season Ended) 09/01/2019    A1C test (Diabetic or Prediabetic)  05/02/2020    Lipid screen  05/02/2020    Potassium monitoring  05/02/2020    Creatinine monitoring  05/02/2020             (applicable per patient's age: Cancer Screenings, Depression Screening, Fall Risk Screening, Immunizations)    Hemoglobin A1C (%)   Date Value   05/02/2019 6.5 (H)     LDL Cholesterol (mg/dL)   Date Value   05/02/2019 97     AST (U/L)   Date Value   05/02/2019 69 (H)     ALT (U/L)   Date Value   05/02/2019 56 (H)     BUN (mg/dL)   Date Value   05/02/2019 14      (goal A1C is < 7)   (goal LDL is <100) need 30-50% reduction from baseline     BP Readings from Last 3 Encounters:   05/08/19 (!) 158/108   04/25/19 128/88   04/10/19 (!) 142/98    (goal /80)      All Future Testing planned in [facility]:  Lab Frequency Next Occurrence   XR SHOULDER LEFT (MIN 2 VIEWS) Once 05/31/2019       Next Visit Date:  Future Appointments   Date Time Provider Marilyn Lundberg   8/8/2019  1:00 PM Coco Garza, APR[facility]            There is no problem list on file for this patient.

## 2019-05-20 NOTE — TELEPHONE ENCOUNTER
Spoke with patient and told her to check glucose daily not three times a day.      Told her we could send a new script , not sure if insurance will pay for it so soon  Rx pending

## 2019-06-01 ENCOUNTER — APPOINTMENT (OUTPATIENT)
Dept: GENERAL RADIOLOGY | Age: 49
End: 2019-06-01
Payer: COMMERCIAL

## 2019-06-01 ENCOUNTER — HOSPITAL ENCOUNTER (EMERGENCY)
Age: 49
Discharge: HOME | End: 2019-06-01
Attending: FAMILY MEDICINE
Payer: COMMERCIAL

## 2019-06-01 VITALS
BODY MASS INDEX: 44.18 KG/M2 | TEMPERATURE: 98.6 F | RESPIRATION RATE: 18 BRPM | DIASTOLIC BLOOD PRESSURE: 69 MMHG | SYSTOLIC BLOOD PRESSURE: 101 MMHG | WEIGHT: 257.4 LBS | HEART RATE: 111 BPM | OXYGEN SATURATION: 98 %

## 2019-06-01 DIAGNOSIS — S43.422A SPRAIN OF LEFT ROTATOR CUFF CAPSULE, INITIAL ENCOUNTER: Primary | ICD-10-CM

## 2019-06-01 PROCEDURE — 99283 EMERGENCY DEPT VISIT LOW MDM: CPT

## 2019-06-01 PROCEDURE — 6360000002 HC RX W HCPCS: Performed by: FAMILY MEDICINE

## 2019-06-01 PROCEDURE — 73030 X-RAY EXAM OF SHOULDER: CPT

## 2019-06-01 PROCEDURE — 96372 THER/PROPH/DIAG INJ SC/IM: CPT

## 2019-06-01 RX ORDER — MELOXICAM 15 MG/1
15 TABLET ORAL DAILY
Qty: 10 TABLET | Refills: 0 | Status: SHIPPED | OUTPATIENT
Start: 2019-06-01 | End: 2019-10-10 | Stop reason: ALTCHOICE

## 2019-06-01 RX ORDER — KETOROLAC TROMETHAMINE 30 MG/ML
30 INJECTION, SOLUTION INTRAMUSCULAR; INTRAVENOUS ONCE
Status: COMPLETED | OUTPATIENT
Start: 2019-06-01 | End: 2019-06-01

## 2019-06-01 RX ADMIN — KETOROLAC TROMETHAMINE 30 MG: 30 INJECTION INTRAMUSCULAR; INTRAVENOUS at 20:50

## 2019-06-01 ASSESSMENT — PAIN DESCRIPTION - FREQUENCY: FREQUENCY: INTERMITTENT

## 2019-06-01 ASSESSMENT — PAIN DESCRIPTION - PAIN TYPE: TYPE: ACUTE PAIN

## 2019-06-01 ASSESSMENT — PAIN SCALES - GENERAL
PAINLEVEL_OUTOF10: 2
PAINLEVEL_OUTOF10: 2

## 2019-06-01 ASSESSMENT — PAIN DESCRIPTION - LOCATION: LOCATION: SHOULDER

## 2019-06-01 ASSESSMENT — PAIN DESCRIPTION - ONSET: ONSET: ON-GOING

## 2019-06-01 ASSESSMENT — PAIN DESCRIPTION - ORIENTATION: ORIENTATION: LEFT

## 2019-06-01 ASSESSMENT — PAIN DESCRIPTION - PROGRESSION: CLINICAL_PROGRESSION: NOT CHANGED

## 2019-06-01 ASSESSMENT — PAIN DESCRIPTION - DESCRIPTORS: DESCRIPTORS: ACHING

## 2019-06-02 NOTE — ED PROVIDER NOTES
eMERGENCY dEPARTMENT eNCOUnter        [address] [facility]    Chief Complaint   Patient presents with    Arm Pain     Left shoulder pain for last few months that comes and goes. Pt states it started today and it is worse. ANN Curtis is a 47 y.o. female who presents with left shoulder pain for 2 months. She describes her symptoms as being moderate in severity. No history of an acute injury. REVIEW OF SYSTEMS    All systems reviewed and positives are in the history of present illness.      PAST MEDICAL HISTORY    Past Medical History:   Diagnosis Date    Anemia     Depression     Hyperlipidemia     Hypertension        SURGICAL HISTORY    Past Surgical History:   Procedure Laterality Date    CHOLECYSTECTOMY      HYSTERECTOMY      TONSILLECTOMY         CURRENT MEDICATIONS    Current Outpatient Rx   Medication Sig Dispense Refill    meloxicam (MOBIC) 15 MG tablet Take 1 tablet by mouth daily 10 tablet 0    metFORMIN (GLUCOPHAGE-XR) 500 MG extended release tablet Take 1 tablet by mouth daily (with breakfast) 30 tablet 2    hydrochlorothiazide (HYDRODIURIL) 25 MG tablet Take 1 tablet by mouth every morning 30 tablet 0    lisinopril (PRINIVIL;ZESTRIL) 40 MG tablet Take 1 tablet by mouth daily 30 tablet 5    Cholecalciferol (VITAMIN D) 2000 units TABS tablet Take 1 tablet by mouth daily 30 tablet 5    cyclobenzaprine (FLEXERIL) 10 MG tablet Take 1 tablet by mouth nightly as needed for Muscle spasms 30 tablet 5    sertraline (ZOLOFT) 100 MG tablet Take 1 tablet by mouth daily 30 tablet 5    simvastatin (ZOCOR) 40 MG tablet Take 1 tablet by mouth nightly 30 tablet 5    loratadine (CLARITIN) 10 MG tablet Take 1 tablet by mouth daily 30 tablet 5    omeprazole (PRILOSEC) 20 MG delayed release capsule Take 1 capsule by mouth daily 30 capsule 5    Multiple Vitamin (ONE DAILY ESSENTIAL PO) Take 1 tablet by mouth daily      blood glucose monitor strips Check daily 100 strip 1    blood glucose monitor kit and supplies Check daily 1 kit 0    Lancets MISC Check blood sugar daily 100 each 3       ALLERGIES    No Known Allergies    FAMILY HISTORY    History reviewed. No pertinent family history. SOCIAL HISTORY    Social History     Socioeconomic History    Marital status:      Spouse name: None    Number of children: None    Years of education: None    Highest education level: None   Occupational History    None   Social Needs    Financial resource strain: None    Food insecurity:     Worry: None     Inability: None    Transportation needs:     Medical: None     Non-medical: None   Tobacco Use    Smoking status: Never Smoker    Smokeless tobacco: Never Used   Substance and Sexual Activity    Alcohol use: No    Drug use: No    Sexual activity: Not Currently   Lifestyle    Physical activity:     Days per week: None     Minutes per session: None    Stress: None   Relationships    Social connections:     Talks on phone: None     Gets together: None     Attends Restorationism service: None     Active member of club or organization: None     Attends meetings of clubs or organizations: None     Relationship status: None    Intimate partner violence:     Fear of current or ex partner: None     Emotionally abused: None     Physically abused: None     Forced sexual activity: None   Other Topics Concern    None   Social History Narrative    ** Merged History Encounter **            PHYSICAL EXAM    VITAL SIGNS: /69   Pulse 111   Temp 98.6 °F (37 °C) (Oral)   Resp 18   Wt 257 lb 6.4 oz (116.8 kg)   SpO2 98%   BMI 44.18 kg/m²   Constitutional:  Well developed, well nourished, moderate acute distress, non-toxic appearance   HENT:  Atraumatic, external ears normal, nose normal, oropharynx moist.  Neck- normal range of motion, no tenderness, supple   Respiratory:  No respiratory distress, normal breath sounds.    Cardiovascular:  Normal rate, normal rhythm, no murmurs, no gallops, no rubs   GI:  Soft, nondistended, normal bowel sounds, nontender   Musculoskeletal:  Left shoulder tenderness. Decreased range of motion due to pain. Integument:  Well hydrated, no rash   Neurologic:  Grossly intact    RADIOLOGY/PROCEDURES    X-rays of left shoulder were negative for acute changes. ED COURSE & MEDICAL DECISION MAKING    Pertinent Labs & Imaging studies reviewed. (See chart for details)  Patient received IM Toradol in the ER with good relief. Treat as an outpatient with meloxicam.    FINAL IMPRESSION    1. Left shoulder sprain. 2.      Summation      Patient Course: X-rays were negative for acute changes. Patient will need an MRI of her left shoulder as an outpatient. She was stable on discharge. ED Medications administered this visit:    Medications   ketorolac (TORADOL) injection 30 mg (30 mg Intramuscular Given 6/1/19 2050)       New Prescriptions from this visit:    Discharge Medication List as of 6/1/2019 10:02 PM      START taking these medications    Details   meloxicam (MOBIC) 15 MG tablet Take 1 tablet by mouth daily, Disp-10 tablet, R-0Print             Follow-up:  [facility] Freitas [phone]    Call in 2 days          Final Impression:   1.  Sprain of left rotator cuff capsule, initial encounter Stable              (Please note that portions of this note were completed with a voice recognition program.  Efforts were made to edit the dictations but occasionally words are mis-transcribed.)       Leighton Torres MD  06/01/19 0882

## 2019-07-22 ENCOUNTER — OFFICE VISIT (OUTPATIENT)
Dept: FAMILY MEDICINE CLINIC | Age: 49
End: 2019-07-22
Payer: COMMERCIAL

## 2019-07-22 VITALS
OXYGEN SATURATION: 98 % | BODY MASS INDEX: 42.85 KG/M2 | HEIGHT: 64 IN | SYSTOLIC BLOOD PRESSURE: 112 MMHG | DIASTOLIC BLOOD PRESSURE: 80 MMHG | WEIGHT: 251 LBS | HEART RATE: 94 BPM

## 2019-07-22 DIAGNOSIS — Z12.12 SCREENING FOR COLORECTAL CANCER: ICD-10-CM

## 2019-07-22 DIAGNOSIS — E11.8 TYPE 2 DIABETES MELLITUS WITH COMPLICATION, WITHOUT LONG-TERM CURRENT USE OF INSULIN (HCC): Primary | ICD-10-CM

## 2019-07-22 DIAGNOSIS — Z12.39 SCREENING FOR BREAST CANCER: ICD-10-CM

## 2019-07-22 DIAGNOSIS — Z12.11 SCREENING FOR COLORECTAL CANCER: ICD-10-CM

## 2019-07-22 LAB
CREATININE URINE POCT: 300
HBA1C MFR BLD: 6.2 %
MICROALBUMIN/CREAT 24H UR: NORMAL MG/G{CREAT}
MICROALBUMIN/CREAT UR-RTO: <30

## 2019-07-22 PROCEDURE — 2022F DILAT RTA XM EVC RTNOPTHY: CPT | Performed by: NURSE PRACTITIONER

## 2019-07-22 PROCEDURE — 3044F HG A1C LEVEL LT 7.0%: CPT | Performed by: NURSE PRACTITIONER

## 2019-07-22 PROCEDURE — G8417 CALC BMI ABV UP PARAM F/U: HCPCS | Performed by: NURSE PRACTITIONER

## 2019-07-22 PROCEDURE — 99214 OFFICE O/P EST MOD 30 MIN: CPT | Performed by: NURSE PRACTITIONER

## 2019-07-22 PROCEDURE — 1036F TOBACCO NON-USER: CPT | Performed by: NURSE PRACTITIONER

## 2019-07-22 PROCEDURE — 82044 UR ALBUMIN SEMIQUANTITATIVE: CPT | Performed by: NURSE PRACTITIONER

## 2019-07-22 PROCEDURE — G8427 DOCREV CUR MEDS BY ELIG CLIN: HCPCS | Performed by: NURSE PRACTITIONER

## 2019-07-22 PROCEDURE — 3017F COLORECTAL CA SCREEN DOC REV: CPT | Performed by: NURSE PRACTITIONER

## 2019-07-22 PROCEDURE — 83036 HEMOGLOBIN GLYCOSYLATED A1C: CPT | Performed by: NURSE PRACTITIONER

## 2019-07-22 RX ORDER — METFORMIN HYDROCHLORIDE 500 MG/1
500 TABLET, EXTENDED RELEASE ORAL
Qty: 30 TABLET | Refills: 2 | Status: SHIPPED | OUTPATIENT
Start: 2019-07-22 | End: 2019-10-10 | Stop reason: SDUPTHER

## 2019-07-22 RX ORDER — DOCUSATE SODIUM 50 MG
CAPSULE ORAL
Refills: 0 | COMMUNITY
Start: 2019-06-17 | End: 2019-09-26 | Stop reason: SDUPTHER

## 2019-07-22 ASSESSMENT — ENCOUNTER SYMPTOMS
COUGH: 1
DIARRHEA: 0
NAUSEA: 0
SINUS PAIN: 1
VOMITING: 0
SHORTNESS OF BREATH: 0

## 2019-07-22 NOTE — PROGRESS NOTES
HPI Notes    Name: Lamberto Chase  : 1970         Chief Complaint:     Chief Complaint   Patient presents with    Sinusitis     posts nasal drainage, jaw pain, glands hurt in throat. cough due to throat tickle. History of Present Illness:        Diabetes   She presents for her follow-up diabetic visit. She has type 2 diabetes mellitus. Her disease course has been stable. There are no hypoglycemic associated symptoms. Pertinent negatives for hypoglycemia include no dizziness, headaches or seizures. Pertinent negatives for diabetes include no chest pain. Symptoms are stable. Risk factors for coronary artery disease include diabetes mellitus and obesity. Current diabetic treatment includes oral agent (monotherapy). She is following a generally healthy diet. URI    This is a new problem. The current episode started in the past 7 days. The problem has been waxing and waning. There has been no fever. Associated symptoms include congestion, coughing and sinus pain. Pertinent negatives include no chest pain, diarrhea, headaches, nausea or vomiting. She has tried nothing for the symptoms.        Past Medical History:     Past Medical History:   Diagnosis Date    Anemia     Depression     Hyperlipidemia     Hypertension       Reviewed all health maintenance requirements and ordered appropriate tests  Health Maintenance Due   Topic Date Due    Diabetic foot exam  02/15/1979    Diabetic retinal exam  02/15/1979    HIV screen  02/15/1984    Diabetic microalbuminuria test  02/15/1987    DTaP/Tdap/Td vaccine (1 - Tdap) 02/15/1988    Cervical cancer screen  02/15/1990    Breast cancer screen  02/15/2019    Shingles Vaccine (1 of 2) 02/15/2019    Colon cancer screen colonoscopy  02/15/2019       Past Surgical History:     Past Surgical History:   Procedure Laterality Date    CHOLECYSTECTOMY      HYSTERECTOMY      TONSILLECTOMY          Medications:       Prior to Admission medications Medication Sig Start Date End Date Taking? Authorizing Provider   diclofenac (VOLTAREN) 50 MG EC tablet Take 1 tablet by mouth 3 times daily as needed for Pain 6/19/19  Yes Historical Provider, MD   Multiple Vitamin (RA ONE DAILY ESSENTIAL) TABS  6/17/19  Yes Historical Provider, MD   metFORMIN (GLUCOPHAGE-XR) 500 MG extended release tablet Take 1 tablet by mouth daily (with breakfast) 7/22/19  Yes [facility], APRN - CNP   meloxicam (MOBIC) 15 MG tablet Take 1 tablet by mouth daily 6/1/19  Yes Ivonne Perez MD   blood glucose monitor strips Check daily 5/20/19  Yes [facility], TOSHA - CNP   blood glucose monitor kit and supplies Check daily 5/8/19  Yes [facility], TOSHA - CNP   Lancets MISC Check blood sugar daily 5/8/19  Yes [facility], APRN - CNP   lisinopril (PRINIVIL;ZESTRIL) 40 MG tablet Take 1 tablet by mouth daily 4/25/19  Yes [facility], APRN - CNP   Cholecalciferol (VITAMIN D) 2000 units TABS tablet Take 1 tablet by mouth daily 4/25/19  Yes [facility], APRN - CNP   cyclobenzaprine (FLEXERIL) 10 MG tablet Take 1 tablet by mouth nightly as needed for Muscle spasms 4/25/19  Yes [facility], APRN - CNP   sertraline (ZOLOFT) 100 MG tablet Take 1 tablet by mouth daily 4/25/19  Yes [facility], APRN - CNP   simvastatin (ZOCOR) 40 MG tablet Take 1 tablet by mouth nightly 4/25/19  Yes [facility], APRN - CNP   loratadine (CLARITIN) 10 MG tablet Take 1 tablet by mouth daily 4/25/19  Yes [facility], APRN - CNP   omeprazole (PRILOSEC) 20 MG delayed release capsule Take 1 capsule by mouth daily 4/25/19  Yes [facility], APRN - CNP        Allergies:       Patient has no known allergies. Social History:     Tobacco:    reports that she has never smoked. She has never used smokeless tobacco.  Alcohol:      reports that she does not drink alcohol. Drug Use:  reports that she does not use drugs.     Family History:      No family medications. Barriers to medication compliance addressed. Patient given educational materials - see patient instructions. All patient questions answered. Patient voiced understanding.

## 2019-07-22 NOTE — PATIENT INSTRUCTIONS
SURVEY:    You may be receiving a survey from [employer] regarding your visit today. Please complete the survey to enable us to provide the highest quality of care to you and your family. If you cannot score us as very good on any question, please call the office to discuss how we could have made your experience exceptional.     Thank you.

## 2019-07-25 ENCOUNTER — TELEPHONE (OUTPATIENT)
Dept: FAMILY MEDICINE CLINIC | Age: 49
End: 2019-07-25

## 2019-07-25 DIAGNOSIS — Z12.11 SCREENING FOR COLORECTAL CANCER: ICD-10-CM

## 2019-07-25 DIAGNOSIS — Z12.12 SCREENING FOR COLORECTAL CANCER: ICD-10-CM

## 2019-07-25 DIAGNOSIS — Z12.11 SCREEN FOR COLON CANCER: Primary | ICD-10-CM

## 2019-07-25 DIAGNOSIS — R19.5 POSITIVE FIT (FECAL IMMUNOCHEMICAL TEST): ICD-10-CM

## 2019-07-25 LAB
CONTROL: NEGATIVE
HEMOCCULT STL QL: POSITIVE

## 2019-07-25 PROCEDURE — 82274 ASSAY TEST FOR BLOOD FECAL: CPT | Performed by: NURSE PRACTITIONER

## 2019-08-05 ENCOUNTER — TELEPHONE (OUTPATIENT)
Dept: FAMILY MEDICINE CLINIC | Age: 49
End: 2019-08-05

## 2019-09-26 RX ORDER — DOCUSATE SODIUM 50 MG
CAPSULE ORAL
Qty: 30 TABLET | Refills: 3 | Status: SHIPPED | OUTPATIENT
Start: 2019-09-26 | End: 2020-01-10

## 2019-09-26 NOTE — TELEPHONE ENCOUNTER
Last OV 7/22/19 for check up  She has appt 10/10/19  Requesting refills on multivitamin and diclofenac  Rx pending

## 2019-10-07 RX ORDER — CYCLOBENZAPRINE HCL 10 MG
TABLET ORAL
Qty: 30 TABLET | Refills: 5 | Status: SHIPPED | OUTPATIENT
Start: 2019-10-07 | End: 2020-02-06 | Stop reason: SDUPTHER

## 2019-10-07 RX ORDER — LISINOPRIL 40 MG/1
40 TABLET ORAL DAILY
Qty: 30 TABLET | Refills: 5 | Status: SHIPPED | OUTPATIENT
Start: 2019-10-07 | End: 2020-02-06 | Stop reason: SDUPTHER

## 2019-10-10 ENCOUNTER — OFFICE VISIT (OUTPATIENT)
Dept: FAMILY MEDICINE CLINIC | Age: 49
End: 2019-10-10
Payer: COMMERCIAL

## 2019-10-10 VITALS
SYSTOLIC BLOOD PRESSURE: 136 MMHG | HEART RATE: 90 BPM | BODY MASS INDEX: 43.94 KG/M2 | WEIGHT: 256 LBS | DIASTOLIC BLOOD PRESSURE: 88 MMHG | OXYGEN SATURATION: 96 % | TEMPERATURE: 98.2 F

## 2019-10-10 DIAGNOSIS — F43.21 GRIEF REACTION: ICD-10-CM

## 2019-10-10 DIAGNOSIS — E11.8 TYPE 2 DIABETES MELLITUS WITH COMPLICATION, WITHOUT LONG-TERM CURRENT USE OF INSULIN (HCC): Primary | ICD-10-CM

## 2019-10-10 DIAGNOSIS — K21.9 GASTROESOPHAGEAL REFLUX DISEASE WITHOUT ESOPHAGITIS: ICD-10-CM

## 2019-10-10 DIAGNOSIS — G62.9 NEUROPATHY: ICD-10-CM

## 2019-10-10 LAB — HBA1C MFR BLD: 6.1 %

## 2019-10-10 PROCEDURE — 83036 HEMOGLOBIN GLYCOSYLATED A1C: CPT | Performed by: NURSE PRACTITIONER

## 2019-10-10 PROCEDURE — 99214 OFFICE O/P EST MOD 30 MIN: CPT | Performed by: NURSE PRACTITIONER

## 2019-10-10 PROCEDURE — G8417 CALC BMI ABV UP PARAM F/U: HCPCS | Performed by: NURSE PRACTITIONER

## 2019-10-10 PROCEDURE — 1036F TOBACCO NON-USER: CPT | Performed by: NURSE PRACTITIONER

## 2019-10-10 PROCEDURE — G8427 DOCREV CUR MEDS BY ELIG CLIN: HCPCS | Performed by: NURSE PRACTITIONER

## 2019-10-10 PROCEDURE — 3044F HG A1C LEVEL LT 7.0%: CPT | Performed by: NURSE PRACTITIONER

## 2019-10-10 PROCEDURE — 2022F DILAT RTA XM EVC RTNOPTHY: CPT | Performed by: NURSE PRACTITIONER

## 2019-10-10 PROCEDURE — G8484 FLU IMMUNIZE NO ADMIN: HCPCS | Performed by: NURSE PRACTITIONER

## 2019-10-10 PROCEDURE — 3017F COLORECTAL CA SCREEN DOC REV: CPT | Performed by: NURSE PRACTITIONER

## 2019-10-10 RX ORDER — OMEPRAZOLE 20 MG/1
20 CAPSULE, DELAYED RELEASE ORAL DAILY
Qty: 30 CAPSULE | Refills: 5 | Status: SHIPPED | OUTPATIENT
Start: 2019-10-10 | End: 2020-02-06 | Stop reason: SDUPTHER

## 2019-10-10 RX ORDER — SIMVASTATIN 40 MG
40 TABLET ORAL NIGHTLY
Qty: 30 TABLET | Refills: 5 | Status: SHIPPED | OUTPATIENT
Start: 2019-10-10 | End: 2020-02-06 | Stop reason: SDUPTHER

## 2019-10-10 RX ORDER — SERTRALINE HYDROCHLORIDE 100 MG/1
100 TABLET, FILM COATED ORAL DAILY
Qty: 30 TABLET | Refills: 5 | Status: SHIPPED | OUTPATIENT
Start: 2019-10-10 | End: 2020-02-06 | Stop reason: SDUPTHER

## 2019-10-10 RX ORDER — GABAPENTIN 100 MG/1
100 CAPSULE ORAL 2 TIMES DAILY
Qty: 60 CAPSULE | Refills: 0 | Status: SHIPPED | OUTPATIENT
Start: 2019-10-10 | End: 2019-11-14 | Stop reason: ALTCHOICE

## 2019-10-10 RX ORDER — METFORMIN HYDROCHLORIDE 500 MG/1
500 TABLET, EXTENDED RELEASE ORAL
Qty: 30 TABLET | Refills: 2 | Status: SHIPPED | OUTPATIENT
Start: 2019-10-10 | End: 2020-01-30

## 2019-10-10 RX ORDER — CHOLECALCIFEROL (VITAMIN D3) 50 MCG
2000 TABLET ORAL DAILY
Qty: 30 TABLET | Refills: 5 | Status: SHIPPED | OUTPATIENT
Start: 2019-10-10 | End: 2020-02-06 | Stop reason: SDUPTHER

## 2019-10-10 RX ORDER — LORATADINE 10 MG/1
10 TABLET ORAL DAILY
Qty: 30 TABLET | Refills: 5 | Status: SHIPPED | OUTPATIENT
Start: 2019-10-10 | End: 2020-02-06 | Stop reason: SDUPTHER

## 2019-10-10 ASSESSMENT — ENCOUNTER SYMPTOMS
BLURRED VISION: 0
CONSTIPATION: 0
SHORTNESS OF BREATH: 0
DIARRHEA: 0
ABDOMINAL PAIN: 0
SORE THROAT: 0
VISUAL CHANGE: 0
NAUSEA: 0
BACK PAIN: 0
VOMITING: 0
BLOOD IN STOOL: 0
COUGH: 0

## 2019-11-14 ENCOUNTER — OFFICE VISIT (OUTPATIENT)
Dept: FAMILY MEDICINE CLINIC | Age: 49
End: 2019-11-14
Payer: COMMERCIAL

## 2019-11-14 VITALS
TEMPERATURE: 98.1 F | SYSTOLIC BLOOD PRESSURE: 136 MMHG | OXYGEN SATURATION: 96 % | BODY MASS INDEX: 43.43 KG/M2 | DIASTOLIC BLOOD PRESSURE: 88 MMHG | WEIGHT: 253 LBS | HEART RATE: 98 BPM

## 2019-11-14 DIAGNOSIS — G62.9 NEUROPATHY: Primary | ICD-10-CM

## 2019-11-14 DIAGNOSIS — J06.9 VIRAL URI: ICD-10-CM

## 2019-11-14 DIAGNOSIS — M67.441 GANGLION CYST OF FINGER OF RIGHT HAND: ICD-10-CM

## 2019-11-14 PROCEDURE — 99213 OFFICE O/P EST LOW 20 MIN: CPT | Performed by: NURSE PRACTITIONER

## 2019-11-14 PROCEDURE — 3017F COLORECTAL CA SCREEN DOC REV: CPT | Performed by: NURSE PRACTITIONER

## 2019-11-14 PROCEDURE — G8417 CALC BMI ABV UP PARAM F/U: HCPCS | Performed by: NURSE PRACTITIONER

## 2019-11-14 PROCEDURE — 1036F TOBACCO NON-USER: CPT | Performed by: NURSE PRACTITIONER

## 2019-11-14 PROCEDURE — G8484 FLU IMMUNIZE NO ADMIN: HCPCS | Performed by: NURSE PRACTITIONER

## 2019-11-14 PROCEDURE — G8427 DOCREV CUR MEDS BY ELIG CLIN: HCPCS | Performed by: NURSE PRACTITIONER

## 2019-11-14 RX ORDER — PSEUDOEPHEDRINE HCL 60 MG/1
60 TABLET ORAL EVERY 6 HOURS
Qty: 28 TABLET | Refills: 0 | Status: SHIPPED | OUTPATIENT
Start: 2019-11-14 | End: 2019-11-21

## 2019-11-14 RX ORDER — PRAMIPEXOLE DIHYDROCHLORIDE 1 MG/1
1 TABLET ORAL 3 TIMES DAILY
Qty: 90 TABLET | Refills: 2 | Status: SHIPPED | OUTPATIENT
Start: 2019-11-14 | End: 2019-11-26 | Stop reason: ALTCHOICE

## 2019-11-14 ASSESSMENT — ENCOUNTER SYMPTOMS
COUGH: 1
SINUS PAIN: 1
RHINORRHEA: 1
NAUSEA: 0
SHORTNESS OF BREATH: 0
VOMITING: 0
DIARRHEA: 0

## 2019-11-22 ENCOUNTER — TELEPHONE (OUTPATIENT)
Dept: FAMILY MEDICINE CLINIC | Age: 49
End: 2019-11-22

## 2019-11-22 DIAGNOSIS — M67.441 GANGLION CYST OF FINGER OF RIGHT HAND: Primary | ICD-10-CM

## 2019-11-26 ENCOUNTER — TELEPHONE (OUTPATIENT)
Dept: FAMILY MEDICINE CLINIC | Age: 49
End: 2019-11-26

## 2019-11-26 DIAGNOSIS — G62.9 NEUROPATHY: Primary | ICD-10-CM

## 2019-11-26 RX ORDER — PREGABALIN 75 MG/1
75 CAPSULE ORAL 2 TIMES DAILY
Qty: 60 CAPSULE | Refills: 1 | Status: SHIPPED | OUTPATIENT
Start: 2019-11-26 | End: 2020-07-10 | Stop reason: ALTCHOICE

## 2020-01-10 RX ORDER — DOCUSATE SODIUM 50 MG
CAPSULE ORAL
Qty: 30 TABLET | Refills: 3 | Status: SHIPPED | OUTPATIENT
Start: 2020-01-10 | End: 2020-02-06 | Stop reason: SDUPTHER

## 2020-01-19 ENCOUNTER — HOSPITAL ENCOUNTER (EMERGENCY)
Age: 50
Discharge: ANOTHER HOSPITAL | End: 2020-01-19
Attending: EMERGENCY MEDICINE
Payer: COMMERCIAL

## 2020-01-19 ENCOUNTER — APPOINTMENT (OUTPATIENT)
Dept: CT IMAGING | Age: 50
End: 2020-01-19
Payer: COMMERCIAL

## 2020-01-19 ENCOUNTER — APPOINTMENT (OUTPATIENT)
Dept: GENERAL RADIOLOGY | Age: 50
End: 2020-01-19
Payer: COMMERCIAL

## 2020-01-19 VITALS
TEMPERATURE: 98.3 F | RESPIRATION RATE: 14 BRPM | HEIGHT: 60 IN | WEIGHT: 249.3 LBS | SYSTOLIC BLOOD PRESSURE: 126 MMHG | DIASTOLIC BLOOD PRESSURE: 75 MMHG | BODY MASS INDEX: 48.94 KG/M2 | HEART RATE: 128 BPM | OXYGEN SATURATION: 98 %

## 2020-01-19 LAB
-: NORMAL
ABO/RH: NORMAL
ABSOLUTE EOS #: 0 K/UL (ref 0–0.4)
ABSOLUTE IMMATURE GRANULOCYTE: ABNORMAL K/UL (ref 0–0.3)
ABSOLUTE LYMPH #: 1.5 K/UL (ref 1–4.8)
ABSOLUTE MONO #: 0.1 K/UL (ref 0–1)
ALBUMIN SERPL-MCNC: 4.7 G/DL (ref 3.5–5.2)
ALBUMIN/GLOBULIN RATIO: ABNORMAL (ref 1–2.5)
ALP BLD-CCNC: 133 U/L (ref 35–104)
ALT SERPL-CCNC: 51 U/L (ref 5–33)
AMORPHOUS: NORMAL
ANION GAP SERPL CALCULATED.3IONS-SCNC: 20 MMOL/L (ref 9–17)
ANTIBODY SCREEN: NEGATIVE
ARM BAND NUMBER: NORMAL
AST SERPL-CCNC: 32 U/L
BACTERIA: NORMAL
BASOPHILS # BLD: 0 % (ref 0–2)
BASOPHILS ABSOLUTE: 0 K/UL (ref 0–0.2)
BILIRUB SERPL-MCNC: 0.56 MG/DL (ref 0.3–1.2)
BILIRUBIN URINE: ABNORMAL
BUN BLDV-MCNC: 52 MG/DL (ref 6–20)
BUN/CREAT BLD: 28 (ref 9–20)
CALCIUM SERPL-MCNC: 11.3 MG/DL (ref 8.6–10.4)
CASTS UA: NORMAL /LPF
CHLORIDE BLD-SCNC: 91 MMOL/L (ref 98–107)
CO2: 26 MMOL/L (ref 20–31)
COLOR: YELLOW
COMMENT UA: ABNORMAL
CREAT SERPL-MCNC: 1.83 MG/DL (ref 0.5–0.9)
CRYSTALS, UA: NORMAL /HPF
DIFFERENTIAL TYPE: YES
DIRECT EXAM: NORMAL
DIRECT EXAM: NORMAL
EOSINOPHILS RELATIVE PERCENT: 0 % (ref 0–5)
EPITHELIAL CELLS UA: NORMAL /HPF
EXPIRATION DATE: NORMAL
GFR AFRICAN AMERICAN: 35 ML/MIN
GFR NON-AFRICAN AMERICAN: 29 ML/MIN
GFR SERPL CREATININE-BSD FRML MDRD: ABNORMAL ML/MIN/{1.73_M2}
GFR SERPL CREATININE-BSD FRML MDRD: ABNORMAL ML/MIN/{1.73_M2}
GLUCOSE BLD-MCNC: 165 MG/DL (ref 70–99)
GLUCOSE URINE: NEGATIVE
HCT VFR BLD CALC: 53.9 % (ref 36–46)
HEMOCCULT STL QL: NEGATIVE
HEMOGLOBIN: 17 G/DL (ref 12–16)
IMMATURE GRANULOCYTES: ABNORMAL %
INR BLD: 0.9
KETONES, URINE: ABNORMAL
LACTIC ACID: 4 MMOL/L (ref 0.5–2.2)
LEUKOCYTE ESTERASE, URINE: ABNORMAL
LIPASE: 31 U/L (ref 13–60)
LYMPHOCYTES # BLD: 15 % (ref 15–40)
Lab: NORMAL
MCH RBC QN AUTO: 25.3 PG (ref 26–34)
MCHC RBC AUTO-ENTMCNC: 31.6 G/DL (ref 31–37)
MCV RBC AUTO: 80.1 FL (ref 80–100)
MONOCYTES # BLD: 1 % (ref 4–8)
MUCUS: NORMAL
NITRITE, URINE: NEGATIVE
NRBC AUTOMATED: ABNORMAL PER 100 WBC
OTHER OBSERVATIONS UA: NORMAL
PDW BLD-RTO: 14.7 % (ref 12.1–15.2)
PH UA: 5 (ref 5–8)
PLATELET # BLD: 390 K/UL (ref 140–450)
PLATELET ESTIMATE: ABNORMAL
PMV BLD AUTO: ABNORMAL FL (ref 6–12)
POTASSIUM SERPL-SCNC: 4.3 MMOL/L (ref 3.7–5.3)
PROTEIN UA: ABNORMAL
PROTHROMBIN TIME: 9.3 SEC (ref 9–11.6)
RBC # BLD: 6.73 M/UL (ref 4–5.2)
RBC # BLD: ABNORMAL 10*6/UL
RBC UA: NORMAL /HPF (ref 0–2)
RENAL EPITHELIAL, UA: NORMAL /HPF
SEG NEUTROPHILS: 84 % (ref 47–75)
SEGMENTED NEUTROPHILS ABSOLUTE COUNT: 8.3 K/UL (ref 2.5–7)
SODIUM BLD-SCNC: 137 MMOL/L (ref 135–144)
SPECIFIC GRAVITY UA: 1.02 (ref 1–1.03)
SPECIMEN DESCRIPTION: NORMAL
TOTAL PROTEIN: 8.8 G/DL (ref 6.4–8.3)
TRICHOMONAS: NORMAL
TROPONIN INTERP: NORMAL
TROPONIN T: <0.03 NG/ML
TROPONIN, HIGH SENSITIVITY: NORMAL NG/L (ref 0–14)
TSH SERPL DL<=0.05 MIU/L-ACNC: 1 MIU/L (ref 0.3–5)
TURBIDITY: CLEAR
URINE HGB: ABNORMAL
UROBILINOGEN, URINE: NORMAL
WBC # BLD: 9.9 K/UL (ref 3.5–11)
WBC # BLD: ABNORMAL 10*3/UL
WBC UA: NORMAL /HPF
YEAST: NORMAL

## 2020-01-19 PROCEDURE — 96365 THER/PROPH/DIAG IV INF INIT: CPT

## 2020-01-19 PROCEDURE — 85025 COMPLETE CBC W/AUTO DIFF WBC: CPT

## 2020-01-19 PROCEDURE — 84443 ASSAY THYROID STIM HORMONE: CPT

## 2020-01-19 PROCEDURE — 86901 BLOOD TYPING SEROLOGIC RH(D): CPT

## 2020-01-19 PROCEDURE — 71275 CT ANGIOGRAPHY CHEST: CPT

## 2020-01-19 PROCEDURE — 71045 X-RAY EXAM CHEST 1 VIEW: CPT

## 2020-01-19 PROCEDURE — 99291 CRITICAL CARE FIRST HOUR: CPT

## 2020-01-19 PROCEDURE — 36415 COLL VENOUS BLD VENIPUNCTURE: CPT

## 2020-01-19 PROCEDURE — 87040 BLOOD CULTURE FOR BACTERIA: CPT

## 2020-01-19 PROCEDURE — 87086 URINE CULTURE/COLONY COUNT: CPT

## 2020-01-19 PROCEDURE — 86900 BLOOD TYPING SEROLOGIC ABO: CPT

## 2020-01-19 PROCEDURE — 6360000004 HC RX CONTRAST MEDICATION: Performed by: EMERGENCY MEDICINE

## 2020-01-19 PROCEDURE — 93005 ELECTROCARDIOGRAM TRACING: CPT | Performed by: EMERGENCY MEDICINE

## 2020-01-19 PROCEDURE — 85610 PROTHROMBIN TIME: CPT

## 2020-01-19 PROCEDURE — 87804 INFLUENZA ASSAY W/OPTIC: CPT

## 2020-01-19 PROCEDURE — 84484 ASSAY OF TROPONIN QUANT: CPT

## 2020-01-19 PROCEDURE — 81001 URINALYSIS AUTO W/SCOPE: CPT

## 2020-01-19 PROCEDURE — 80053 COMPREHEN METABOLIC PANEL: CPT

## 2020-01-19 PROCEDURE — 96375 TX/PRO/DX INJ NEW DRUG ADDON: CPT

## 2020-01-19 PROCEDURE — 2580000003 HC RX 258: Performed by: EMERGENCY MEDICINE

## 2020-01-19 PROCEDURE — 83690 ASSAY OF LIPASE: CPT

## 2020-01-19 PROCEDURE — 86850 RBC ANTIBODY SCREEN: CPT

## 2020-01-19 PROCEDURE — 83605 ASSAY OF LACTIC ACID: CPT

## 2020-01-19 PROCEDURE — 6360000002 HC RX W HCPCS: Performed by: EMERGENCY MEDICINE

## 2020-01-19 RX ORDER — 0.9 % SODIUM CHLORIDE 0.9 %
1000 INTRAVENOUS SOLUTION INTRAVENOUS ONCE
Status: COMPLETED | OUTPATIENT
Start: 2020-01-19 | End: 2020-01-19

## 2020-01-19 RX ORDER — PROMETHAZINE HYDROCHLORIDE 25 MG/ML
6.25 INJECTION, SOLUTION INTRAMUSCULAR; INTRAVENOUS ONCE
Status: COMPLETED | OUTPATIENT
Start: 2020-01-19 | End: 2020-01-19

## 2020-01-19 RX ADMIN — PIPERACILLIN SODIUM AND TAZOBACTAM SODIUM 3.38 G: 3; .375 INJECTION, POWDER, LYOPHILIZED, FOR SOLUTION INTRAVENOUS at 21:48

## 2020-01-19 RX ADMIN — SODIUM CHLORIDE 1000 ML: 9 INJECTION, SOLUTION INTRAVENOUS at 21:50

## 2020-01-19 RX ADMIN — PROMETHAZINE HYDROCHLORIDE 6.25 MG: 25 INJECTION INTRAMUSCULAR; INTRAVENOUS at 22:10

## 2020-01-19 RX ADMIN — IOPAMIDOL 75 ML: 755 INJECTION, SOLUTION INTRAVENOUS at 21:39

## 2020-01-19 RX ADMIN — SODIUM CHLORIDE 1000 ML: 9 INJECTION, SOLUTION INTRAVENOUS at 20:30

## 2020-01-19 ASSESSMENT — PAIN SCALES - GENERAL: PAINLEVEL_OUTOF10: 2

## 2020-01-19 ASSESSMENT — ENCOUNTER SYMPTOMS
VOMITING: 1
SHORTNESS OF BREATH: 0
ABDOMINAL PAIN: 1
CONSTIPATION: 0
BLOOD IN STOOL: 0
BACK PAIN: 0
NAUSEA: 1
WHEEZING: 0
COUGH: 0
CHEST TIGHTNESS: 0

## 2020-01-19 ASSESSMENT — PAIN DESCRIPTION - PAIN TYPE: TYPE: ACUTE PAIN

## 2020-01-19 ASSESSMENT — PAIN DESCRIPTION - LOCATION: LOCATION: ABDOMEN;GENERALIZED

## 2020-01-20 ENCOUNTER — HOSPITAL ENCOUNTER (INPATIENT)
Age: 50
LOS: 1 days | Discharge: HOME | DRG: 247 | End: 2020-01-21
Attending: EMERGENCY MEDICINE | Admitting: INTERNAL MEDICINE
Payer: COMMERCIAL

## 2020-01-20 PROBLEM — N17.9 AKI (ACUTE KIDNEY INJURY) (HCC): Status: ACTIVE | Noted: 2020-01-20

## 2020-01-20 PROBLEM — K56.609 SBO (SMALL BOWEL OBSTRUCTION) (HCC): Status: ACTIVE | Noted: 2020-01-20

## 2020-01-20 PROBLEM — E66.01 MORBID OBESITY DUE TO EXCESS CALORIES (HCC): Status: ACTIVE | Noted: 2020-01-20

## 2020-01-20 PROBLEM — E11.9 TYPE 2 DIABETES MELLITUS WITHOUT COMPLICATION, WITHOUT LONG-TERM CURRENT USE OF INSULIN (HCC): Status: ACTIVE | Noted: 2020-01-20

## 2020-01-20 LAB
ABSOLUTE EOS #: 0.06 K/UL (ref 0–0.44)
ABSOLUTE IMMATURE GRANULOCYTE: 0.03 K/UL (ref 0–0.3)
ABSOLUTE LYMPH #: 1.15 K/UL (ref 1.1–3.7)
ABSOLUTE MONO #: 0.76 K/UL (ref 0.1–1.2)
ALBUMIN SERPL-MCNC: 3.5 G/DL (ref 3.5–5.2)
ALBUMIN/GLOBULIN RATIO: 1.1 (ref 1–2.5)
ALP BLD-CCNC: 92 U/L (ref 35–104)
ALT SERPL-CCNC: 38 U/L (ref 5–33)
ANION GAP SERPL CALCULATED.3IONS-SCNC: 16 MMOL/L (ref 9–17)
AST SERPL-CCNC: 25 U/L
BASOPHILS # BLD: 1 % (ref 0–2)
BASOPHILS ABSOLUTE: 0.05 K/UL (ref 0–0.2)
BILIRUB SERPL-MCNC: 0.43 MG/DL (ref 0.3–1.2)
BUN BLDV-MCNC: 46 MG/DL (ref 6–20)
BUN/CREAT BLD: ABNORMAL (ref 9–20)
CALCIUM SERPL-MCNC: 8.9 MG/DL (ref 8.6–10.4)
CHLORIDE BLD-SCNC: 100 MMOL/L (ref 98–107)
CO2: 20 MMOL/L (ref 20–31)
CREAT SERPL-MCNC: 1.02 MG/DL (ref 0.5–0.9)
DIFFERENTIAL TYPE: ABNORMAL
EKG ATRIAL RATE: 134 BPM
EKG ATRIAL RATE: 155 BPM
EKG ATRIAL RATE: 167 BPM
EKG P AXIS: 50 DEGREES
EKG P AXIS: 65 DEGREES
EKG P AXIS: 83 DEGREES
EKG P-R INTERVAL: 100 MS
EKG P-R INTERVAL: 112 MS
EKG P-R INTERVAL: 138 MS
EKG Q-T INTERVAL: 276 MS
EKG Q-T INTERVAL: 302 MS
EKG Q-T INTERVAL: 326 MS
EKG QRS DURATION: 86 MS
EKG QRS DURATION: 90 MS
EKG QRS DURATION: 90 MS
EKG QTC CALCULATION (BAZETT): 443 MS
EKG QTC CALCULATION (BAZETT): 486 MS
EKG QTC CALCULATION (BAZETT): 503 MS
EKG R AXIS: 57 DEGREES
EKG R AXIS: 57 DEGREES
EKG R AXIS: 58 DEGREES
EKG T AXIS: 69 DEGREES
EKG T AXIS: 78 DEGREES
EKG T AXIS: 96 DEGREES
EKG VENTRICULAR RATE: 134 BPM
EKG VENTRICULAR RATE: 155 BPM
EKG VENTRICULAR RATE: 167 BPM
EOSINOPHILS RELATIVE PERCENT: 1 % (ref 1–4)
GFR AFRICAN AMERICAN: >60 ML/MIN
GFR NON-AFRICAN AMERICAN: 57 ML/MIN
GFR SERPL CREATININE-BSD FRML MDRD: ABNORMAL ML/MIN/{1.73_M2}
GFR SERPL CREATININE-BSD FRML MDRD: ABNORMAL ML/MIN/{1.73_M2}
GLUCOSE BLD-MCNC: 162 MG/DL (ref 70–99)
HCT VFR BLD CALC: 47.6 % (ref 36.3–47.1)
HEMOGLOBIN: 14.5 G/DL (ref 11.9–15.1)
IMMATURE GRANULOCYTES: 0 %
LACTIC ACID, SEPSIS WHOLE BLOOD: 2.5 MMOL/L (ref 0.5–1.9)
LACTIC ACID, SEPSIS WHOLE BLOOD: 3.1 MMOL/L (ref 0.5–1.9)
LACTIC ACID, SEPSIS WHOLE BLOOD: 3.3 MMOL/L (ref 0.5–1.9)
LACTIC ACID, SEPSIS: ABNORMAL MMOL/L (ref 0.5–1.9)
LYMPHOCYTES # BLD: 15 % (ref 24–43)
MCH RBC QN AUTO: 25.7 PG (ref 25.2–33.5)
MCHC RBC AUTO-ENTMCNC: 30.5 G/DL (ref 28.4–34.8)
MCV RBC AUTO: 84.4 FL (ref 82.6–102.9)
MONOCYTES # BLD: 10 % (ref 3–12)
MYOGLOBIN: 46 NG/ML (ref 25–58)
MYOGLOBIN: 72 NG/ML (ref 25–58)
MYOGLOBIN: 73 NG/ML (ref 25–58)
NRBC AUTOMATED: 0 PER 100 WBC
PDW BLD-RTO: 14.3 % (ref 11.8–14.4)
PLATELET # BLD: 307 K/UL (ref 138–453)
PLATELET ESTIMATE: ABNORMAL
PMV BLD AUTO: 9.5 FL (ref 8.1–13.5)
POTASSIUM SERPL-SCNC: 4.2 MMOL/L (ref 3.7–5.3)
RBC # BLD: 5.64 M/UL (ref 3.95–5.11)
RBC # BLD: ABNORMAL 10*6/UL
SEG NEUTROPHILS: 73 % (ref 36–65)
SEGMENTED NEUTROPHILS ABSOLUTE COUNT: 5.43 K/UL (ref 1.5–8.1)
SODIUM BLD-SCNC: 136 MMOL/L (ref 135–144)
TOTAL PROTEIN: 6.6 G/DL (ref 6.4–8.3)
TROPONIN INTERP: ABNORMAL
TROPONIN T: ABNORMAL NG/ML
TROPONIN, HIGH SENSITIVITY: 15 NG/L (ref 0–14)
TROPONIN, HIGH SENSITIVITY: 16 NG/L (ref 0–14)
TROPONIN, HIGH SENSITIVITY: 16 NG/L (ref 0–14)
TROPONIN, HIGH SENSITIVITY: 21 NG/L (ref 0–14)
TROPONIN, HIGH SENSITIVITY: 25 NG/L (ref 0–14)
WBC # BLD: 7.5 K/UL (ref 3.5–11.3)
WBC # BLD: ABNORMAL 10*3/UL

## 2020-01-20 PROCEDURE — 96375 TX/PRO/DX INJ NEW DRUG ADDON: CPT

## 2020-01-20 PROCEDURE — 2580000003 HC RX 258: Performed by: NURSE PRACTITIONER

## 2020-01-20 PROCEDURE — 96372 THER/PROPH/DIAG INJ SC/IM: CPT

## 2020-01-20 PROCEDURE — 99223 1ST HOSP IP/OBS HIGH 75: CPT | Performed by: INTERNAL MEDICINE

## 2020-01-20 PROCEDURE — 83874 ASSAY OF MYOGLOBIN: CPT

## 2020-01-20 PROCEDURE — 76937 US GUIDE VASCULAR ACCESS: CPT

## 2020-01-20 PROCEDURE — 1200000000 HC SEMI PRIVATE

## 2020-01-20 PROCEDURE — 93010 ELECTROCARDIOGRAM REPORT: CPT | Performed by: INTERNAL MEDICINE

## 2020-01-20 PROCEDURE — 85025 COMPLETE CBC W/AUTO DIFF WBC: CPT

## 2020-01-20 PROCEDURE — 80053 COMPREHEN METABOLIC PANEL: CPT

## 2020-01-20 PROCEDURE — 83605 ASSAY OF LACTIC ACID: CPT

## 2020-01-20 PROCEDURE — G0378 HOSPITAL OBSERVATION PER HR: HCPCS

## 2020-01-20 PROCEDURE — 96374 THER/PROPH/DIAG INJ IV PUSH: CPT

## 2020-01-20 PROCEDURE — 36415 COLL VENOUS BLD VENIPUNCTURE: CPT

## 2020-01-20 PROCEDURE — 6360000002 HC RX W HCPCS: Performed by: NURSE PRACTITIONER

## 2020-01-20 PROCEDURE — 6370000000 HC RX 637 (ALT 250 FOR IP): Performed by: INTERNAL MEDICINE

## 2020-01-20 PROCEDURE — 2500000003 HC RX 250 WO HCPCS: Performed by: NURSE PRACTITIONER

## 2020-01-20 PROCEDURE — 84484 ASSAY OF TROPONIN QUANT: CPT

## 2020-01-20 PROCEDURE — 93005 ELECTROCARDIOGRAM TRACING: CPT | Performed by: STUDENT IN AN ORGANIZED HEALTH CARE EDUCATION/TRAINING PROGRAM

## 2020-01-20 PROCEDURE — 99284 EMERGENCY DEPT VISIT MOD MDM: CPT

## 2020-01-20 PROCEDURE — 96376 TX/PRO/DX INJ SAME DRUG ADON: CPT

## 2020-01-20 PROCEDURE — 2580000003 HC RX 258: Performed by: STUDENT IN AN ORGANIZED HEALTH CARE EDUCATION/TRAINING PROGRAM

## 2020-01-20 PROCEDURE — 96361 HYDRATE IV INFUSION ADD-ON: CPT

## 2020-01-20 RX ORDER — POTASSIUM CHLORIDE 7.45 MG/ML
10 INJECTION INTRAVENOUS PRN
Status: DISCONTINUED | OUTPATIENT
Start: 2020-01-20 | End: 2020-01-21 | Stop reason: HOSPADM

## 2020-01-20 RX ORDER — SODIUM CHLORIDE 0.9 % (FLUSH) 0.9 %
10 SYRINGE (ML) INJECTION EVERY 12 HOURS SCHEDULED
Status: DISCONTINUED | OUTPATIENT
Start: 2020-01-20 | End: 2020-01-21 | Stop reason: HOSPADM

## 2020-01-20 RX ORDER — MAGNESIUM SULFATE 1 G/100ML
1 INJECTION INTRAVENOUS PRN
Status: DISCONTINUED | OUTPATIENT
Start: 2020-01-20 | End: 2020-01-21 | Stop reason: HOSPADM

## 2020-01-20 RX ORDER — MORPHINE SULFATE 2 MG/ML
2 INJECTION, SOLUTION INTRAMUSCULAR; INTRAVENOUS
Status: DISCONTINUED | OUTPATIENT
Start: 2020-01-20 | End: 2020-01-21 | Stop reason: HOSPADM

## 2020-01-20 RX ORDER — DEXTROSE, SODIUM CHLORIDE, AND POTASSIUM CHLORIDE 5; .45; .15 G/100ML; G/100ML; G/100ML
INJECTION INTRAVENOUS CONTINUOUS
Status: DISCONTINUED | OUTPATIENT
Start: 2020-01-20 | End: 2020-01-21 | Stop reason: HOSPADM

## 2020-01-20 RX ORDER — NICOTINE 21 MG/24HR
1 PATCH, TRANSDERMAL 24 HOURS TRANSDERMAL DAILY PRN
Status: DISCONTINUED | OUTPATIENT
Start: 2020-01-20 | End: 2020-01-21 | Stop reason: HOSPADM

## 2020-01-20 RX ORDER — MORPHINE SULFATE 4 MG/ML
4 INJECTION, SOLUTION INTRAMUSCULAR; INTRAVENOUS
Status: DISCONTINUED | OUTPATIENT
Start: 2020-01-20 | End: 2020-01-21 | Stop reason: HOSPADM

## 2020-01-20 RX ORDER — ONDANSETRON 2 MG/ML
4 INJECTION INTRAMUSCULAR; INTRAVENOUS EVERY 6 HOURS PRN
Status: DISCONTINUED | OUTPATIENT
Start: 2020-01-20 | End: 2020-01-21 | Stop reason: HOSPADM

## 2020-01-20 RX ORDER — POTASSIUM CHLORIDE 20 MEQ/1
40 TABLET, EXTENDED RELEASE ORAL PRN
Status: DISCONTINUED | OUTPATIENT
Start: 2020-01-20 | End: 2020-01-21 | Stop reason: HOSPADM

## 2020-01-20 RX ORDER — CYCLOBENZAPRINE HCL 5 MG
5 TABLET ORAL 2 TIMES DAILY
Status: DISCONTINUED | OUTPATIENT
Start: 2020-01-20 | End: 2020-01-21 | Stop reason: HOSPADM

## 2020-01-20 RX ORDER — OMEPRAZOLE 20 MG/1
20 CAPSULE, DELAYED RELEASE ORAL DAILY
Status: DISCONTINUED | OUTPATIENT
Start: 2020-01-20 | End: 2020-01-21 | Stop reason: HOSPADM

## 2020-01-20 RX ORDER — PRAMIPEXOLE DIHYDROCHLORIDE 1 MG/1
1 TABLET ORAL 3 TIMES DAILY
COMMUNITY
End: 2020-01-30

## 2020-01-20 RX ORDER — ACETAMINOPHEN 650 MG/1
650 SUPPOSITORY RECTAL EVERY 4 HOURS PRN
Status: DISCONTINUED | OUTPATIENT
Start: 2020-01-20 | End: 2020-01-21 | Stop reason: HOSPADM

## 2020-01-20 RX ORDER — PREGABALIN 75 MG/1
75 CAPSULE ORAL 2 TIMES DAILY
Status: DISCONTINUED | OUTPATIENT
Start: 2020-01-20 | End: 2020-01-21 | Stop reason: HOSPADM

## 2020-01-20 RX ORDER — 0.9 % SODIUM CHLORIDE 0.9 %
1000 INTRAVENOUS SOLUTION INTRAVENOUS ONCE
Status: COMPLETED | OUTPATIENT
Start: 2020-01-20 | End: 2020-01-20

## 2020-01-20 RX ORDER — SODIUM CHLORIDE 0.9 % (FLUSH) 0.9 %
10 SYRINGE (ML) INJECTION PRN
Status: DISCONTINUED | OUTPATIENT
Start: 2020-01-20 | End: 2020-01-21 | Stop reason: HOSPADM

## 2020-01-20 RX ORDER — PRAMIPEXOLE DIHYDROCHLORIDE 0.25 MG/1
1 TABLET ORAL 3 TIMES DAILY
Status: DISCONTINUED | OUTPATIENT
Start: 2020-01-20 | End: 2020-01-21 | Stop reason: HOSPADM

## 2020-01-20 RX ADMIN — OMEPRAZOLE 20 MG: 20 CAPSULE, DELAYED RELEASE ORAL at 19:25

## 2020-01-20 RX ADMIN — MORPHINE SULFATE 4 MG: 4 INJECTION INTRAVENOUS at 22:09

## 2020-01-20 RX ADMIN — POTASSIUM CHLORIDE, DEXTROSE MONOHYDRATE AND SODIUM CHLORIDE: 150; 5; 450 INJECTION, SOLUTION INTRAVENOUS at 19:46

## 2020-01-20 RX ADMIN — SODIUM CHLORIDE 1000 ML: 9 INJECTION, SOLUTION INTRAVENOUS at 01:22

## 2020-01-20 RX ADMIN — ENOXAPARIN SODIUM 40 MG: 40 INJECTION SUBCUTANEOUS at 08:54

## 2020-01-20 RX ADMIN — Medication 10 ML: at 08:55

## 2020-01-20 RX ADMIN — PREGABALIN 75 MG: 75 CAPSULE ORAL at 22:08

## 2020-01-20 RX ADMIN — PRAMIPEXOLE DIHYDROCHLORIDE 1 MG: 0.25 TABLET ORAL at 22:08

## 2020-01-20 RX ADMIN — POTASSIUM CHLORIDE, DEXTROSE MONOHYDRATE AND SODIUM CHLORIDE: 150; 5; 450 INJECTION, SOLUTION INTRAVENOUS at 04:09

## 2020-01-20 RX ADMIN — CYCLOBENZAPRINE HYDROCHLORIDE 5 MG: 5 TABLET, FILM COATED ORAL at 22:08

## 2020-01-20 RX ADMIN — FAMOTIDINE 20 MG: 10 INJECTION, SOLUTION INTRAVENOUS at 08:55

## 2020-01-20 RX ADMIN — MORPHINE SULFATE 2 MG: 2 INJECTION, SOLUTION INTRAMUSCULAR; INTRAVENOUS at 05:50

## 2020-01-20 RX ADMIN — ONDANSETRON 4 MG: 2 INJECTION INTRAMUSCULAR; INTRAVENOUS at 05:19

## 2020-01-20 RX ADMIN — SERTRALINE 100 MG: 50 TABLET, FILM COATED ORAL at 19:25

## 2020-01-20 ASSESSMENT — PAIN SCALES - GENERAL
PAINLEVEL_OUTOF10: 3
PAINLEVEL_OUTOF10: 7
PAINLEVEL_OUTOF10: 5
PAINLEVEL_OUTOF10: 6
PAINLEVEL_OUTOF10: 7

## 2020-01-20 ASSESSMENT — PAIN - FUNCTIONAL ASSESSMENT: PAIN_FUNCTIONAL_ASSESSMENT: ACTIVITIES ARE NOT PREVENTED

## 2020-01-20 ASSESSMENT — PAIN DESCRIPTION - DESCRIPTORS
DESCRIPTORS: SPASM
DESCRIPTORS: ACHING

## 2020-01-20 ASSESSMENT — PAIN DESCRIPTION - PAIN TYPE
TYPE: ACUTE PAIN
TYPE: ACUTE PAIN

## 2020-01-20 ASSESSMENT — ENCOUNTER SYMPTOMS
RHINORRHEA: 0
CONSTIPATION: 0
WHEEZING: 0
DIARRHEA: 0
ABDOMINAL PAIN: 1
COLOR CHANGE: 0
NAUSEA: 1
BACK PAIN: 0
VOMITING: 0
COUGH: 0
CHEST TIGHTNESS: 0
SHORTNESS OF BREATH: 0

## 2020-01-20 ASSESSMENT — PAIN DESCRIPTION - ONSET
ONSET: ON-GOING
ONSET: ON-GOING

## 2020-01-20 ASSESSMENT — PAIN DESCRIPTION - LOCATION
LOCATION: ABDOMEN
LOCATION: BUTTOCKS

## 2020-01-20 ASSESSMENT — PAIN DESCRIPTION - ORIENTATION: ORIENTATION: RIGHT

## 2020-01-20 ASSESSMENT — PAIN DESCRIPTION - FREQUENCY
FREQUENCY: CONTINUOUS
FREQUENCY: CONTINUOUS

## 2020-01-20 ASSESSMENT — PAIN DESCRIPTION - PROGRESSION: CLINICAL_PROGRESSION: GRADUALLY IMPROVING

## 2020-01-20 NOTE — PROGRESS NOTES
Dr. Aydee Montoya at pt's bedside explaining why she needs an NG tube. Risks and benefits explained to pt if she doesn't have one. Pt still refusing. Will continue to monitor.

## 2020-01-20 NOTE — ED PROVIDER NOTES
[facility] ED  eMERGENCY dEPARTMENT eNCOUnter      Pt Name: Carmen Markham  MRN: [id number]  [place] 1970  Date of evaluation: 1/19/2020  Provider: Michael Metcalf, [facility]     Chief Complaint   Patient presents with    Illness     Pt c/o N/V for the past two days, states all over body aches and had a  black emesis; HISTORY OF PRESENT ILLNESS    Carmen Markham is a 47 y.o. female who presents to the emergency department from home for body aches and vomiting. Patient states symptoms started 5 days ago. She also admits to abdominal pain that is diffuse. No chest pain or shortness of breath. No fevers or chills. She has not taken her blood pressure or any of her other medications today because of the vomiting. Patient states her emesis is black in color. Triage notes and Nursing notes were reviewed by myself. Any discrepancies are addressed above. PAST MEDICAL HISTORY     Past Medical History:   Diagnosis Date    Anemia     Depression     Diabetes mellitus ([address]     Hyperlipidemia     Hypertension        SURGICAL HISTORY       Past Surgical History:   Procedure Laterality Date    CHOLECYSTECTOMY      HYSTERECTOMY      TONSILLECTOMY         CURRENT MEDICATIONS       Discharge Medication List as of 1/19/2020 10:50 PM      CONTINUE these medications which have NOT CHANGED    Details   Multiple Vitamin (RA ONE DAILY ESSENTIAL) TABS take 1 tablet by mouth daily, Disp-30 tablet, R-3Normal      diclofenac (VOLTAREN) 50 MG EC tablet take 1 tablet by mouth three times a day with meals, Disp-90 tablet, R-2Normal      pregabalin (LYRICA) 75 MG capsule Take 1 capsule by mouth 2 times daily for 60 days. , Disp-60 capsule, R-1Normal      metFORMIN (GLUCOPHAGE-XR) 500 MG extended release tablet Take 1 tablet by mouth daily (with breakfast), Disp-30 tablet, R-2Normal      omeprazole (PRILOSEC) 20 MG delayed release capsule Take 1 capsule by mouth daily, Disp-30 capsule, R-5Normal      loratadine (CLARITIN) 10 MG tablet Take 1 tablet by mouth daily, Disp-30 tablet, R-5Normal      sertraline (ZOLOFT) 100 MG tablet Take 1 tablet by mouth daily, Disp-30 tablet, R-5Normal      simvastatin (ZOCOR) 40 MG tablet Take 1 tablet by mouth nightly, Disp-30 tablet, R-5Normal      Cholecalciferol (VITAMIN D) 2000 units TABS tablet Take 1 tablet by mouth daily, Disp-30 tablet, R-5Normal      lisinopril (PRINIVIL;ZESTRIL) 40 MG tablet take 1 tablet by mouth daily, Disp-30 tablet, R-5Normal      cyclobenzaprine (FLEXERIL) 10 MG tablet take 1 tablet by mouth nightly if needed for muscle spasm, Disp-30 tablet, R-5Normal      blood glucose monitor strips Check daily, Disp-100 strip, R-1, Normal      blood glucose monitor kit and supplies Check daily, Disp-1 kit, R-0, Normal      Lancets MISC Disp-100 each, R-3, NormalCheck blood sugar daily             ALLERGIES     Patient has no known allergies. FAMILY HISTORY     History reviewed. No pertinent family history.      SOCIAL HISTORY     Social History     Socioeconomic History    Marital status:      Spouse name: None    Number of children: None    Years of education: None    Highest education level: None   Occupational History    None   Social Needs    Financial resource strain: None    Food insecurity:     Worry: None     Inability: None    Transportation needs:     Medical: None     Non-medical: None   Tobacco Use    Smoking status: Never Smoker    Smokeless tobacco: Never Used   Substance and Sexual Activity    Alcohol use: No    Drug use: No    Sexual activity: Not Currently   Lifestyle    Physical activity:     Days per week: None     Minutes per session: None    Stress: None   Relationships    Social connections:     Talks on phone: None     Gets together: None     Attends Confucianism service: None     Active member of club or organization: None     Attends meetings of clubs or organizations: None     Relationship motion and neck supple. Cardiovascular:      Rate and Rhythm: Regular rhythm. Tachycardia present. Heart sounds: No murmur. No friction rub. No gallop. Pulmonary:      Effort: Pulmonary effort is normal. No respiratory distress. Breath sounds: Normal breath sounds. No stridor. No wheezing, rhonchi or rales. Chest:      Chest wall: No tenderness. Abdominal:      General: Abdomen is protuberant. Bowel sounds are normal. There is no distension. Palpations: Abdomen is soft. There is no mass. Tenderness: There is generalized tenderness. There is no right CVA tenderness, left CVA tenderness, guarding or rebound. Negative signs include Jameson's sign, Rovsing's sign, McBurney's sign, psoas sign and obturator sign. Hernia: No hernia is present. Genitourinary:     Rectum: Normal. Guaiac result negative. No mass or tenderness. Musculoskeletal: Normal range of motion. General: No tenderness or deformity. Skin:     General: Skin is cool and moist.      Capillary Refill: Capillary refill takes less than 2 seconds. Coloration: Skin is not jaundiced or pale. Findings: No bruising, erythema, lesion or rash. Comments: Cool and diaphoretic    Neurological:      General: No focal deficit present. Mental Status: She is alert and oriented to person, place, and time. Psychiatric:         Mood and Affect: Mood normal.         Behavior: Behavior normal.         DIAGNOSTIC RESULTS     EKG: (none if blank)  All EKG's areinterpreted by the Emergency Department Physician who either signs or Co-signs this chart in the absence of a cardiologist.    Sinus tachycardia. Rate 167. Large Q wave in lead III with questionable ST elevation in the inferior leads. There is also ST depression in lateral leads however this may be secondary to the rate. She has had similar findings on previous EKG.     RADIOLOGY: (none if blank)   Interpretationper the Radiologist below, if available at the time of this note:    CTA CHEST ABDOMEN PELVIS W CONTRAST   Final Result      - SMALL BOWEL: The small bowel is moderately distended and predominantly    fluid-filled. Air-fluid levels. Distal small bowel is less distended. Transition in the RIGHT lower quadrant. Partial small bowel obstruction.      - STOMACH: Moderate fluid distention. Minimal fluid level. - Moderate changes in the hepatic parenchyma consistent with fatty    infiltration. Enlargement of the liver. This measures approximately 18.3 cm in    the midclavicular line. - Partially fecal filled colon. NO significant distention. There are incidental and/or chronic findings also present as noted. XR CHEST PORTABLE   Final Result      - NO evidence of acute disease/changes.                 LABS:  Labs Reviewed   CBC WITH AUTO DIFFERENTIAL - Abnormal; Notable for the following components:       Result Value    RBC 6.73 (*)     Hemoglobin 17.0 (*)     Hematocrit 53.9 (*)     MCH 25.3 (*)     Seg Neutrophils 84 (*)     Monocytes 1 (*)     Segs Absolute 8.30 (*)     All other components within normal limits   COMPREHENSIVE METABOLIC PANEL W/ REFLEX TO MG FOR LOW K - Abnormal; Notable for the following components:    Glucose 165 (*)     BUN 52 (*)     CREATININE 1.83 (*)     Bun/Cre Ratio 28 (*)     Calcium 11.3 (*)     Chloride 91 (*)     Anion Gap 20 (*)     Alkaline Phosphatase 133 (*)     ALT 51 (*)     AST 32 (*)     Total Protein 8.8 (*)     GFR Non- 29 (*)     GFR  35 (*)     All other components within normal limits   LACTIC ACID - Abnormal; Notable for the following components:    Lactic Acid 4.0 (*)     All other components within normal limits   URINALYSIS - Abnormal; Notable for the following components:    Bilirubin Urine NEGATIVE  Verified by ictotest. (*)     Ketones, Urine TRACE (*)     Urine Hgb TRACE (*)     Protein, UA 1+ (*)     Leukocyte Esterase, Urine 1+ (*)     All other components within normal limits   POCT OCCULT BLOOD STOOL NON CA SCREEN - Normal   RAPID INFLUENZA A/B ANTIGENS   URINE CULTURE   CULTURE BLOOD #1   CULTURE BLOOD #2   LIPASE   PROTIME-INR   TROPONIN   TSH WITHOUT REFLEX   MICROSCOPIC URINALYSIS   TYPE AND SCREEN       All other labs were within normal range or not returned as of this dictation. EMERGENCY DEPARTMENT COURSE and Medical Decision Making:     MDM  Number of Diagnoses or Management Options  Abdominal pain, unspecified abdominal location: new, needed workup  Acute renal failure, unspecified acute renal failure type [facility]): new, needed workup  Hypotension, unspecified hypotension type: new, needed workup  Lactic acidosis: new, needed workup  Nausea and vomiting, intractability of vomiting not specified, unspecified vomiting type: new, needed workup  Tachycardia: new, needed workup     Amount and/or Complexity of Data Reviewed  Clinical lab tests: ordered and reviewed  Tests in the radiology section of CPT®: ordered and reviewed  Discussion of test results with the performing providers: yes  Decide to obtain previous medical records or to obtain history from someone other than the patient: yes  Obtain history from someone other than the patient: yes  Review and summarize past medical records: yes  Discuss the patient with other providers: yes  Independent visualization of images, tracings, or specimens: yes    Risk of Complications, Morbidity, and/or Mortality  Presenting problems: high  Diagnostic procedures: high  Management options: high    Critical Care  Total time providing critical care: 30-74 minutes    Patient Progress  Patient progress: stable  /   Patient presented for vomiting, abdominal pain. She was diaphoretic and cool upon arrival with hypotension. Abscess work-up was initiated. IV fluids started. In regards to the EKG, I did speak with interventional cardiologist, Dr Berenice Galindo who did not feel this was a STEMI.   They felt this was sinus vomiting type    7. Elevated liver enzymes    8. Small bowel obstruction (HCC)          DISPOSITION/PLAN    DISPOSITION        PATIENT REFERRED TO:  No follow-up provider specified.     DISCHARGE MEDICATIONS:  Discharge Medication List as of 1/19/2020 10:50 PM                 (Please note that portions of this note were completed with a voicerecognition program.  Efforts were made to edit the dictations but occasionally words are mis-transcribed.)      Michael Metcalf DO (electronically signed)  Attending Emergency Department Provider       Michael Metcalf DO  01/19/20 [id number]

## 2020-01-20 NOTE — ED PROVIDER NOTES
negative    Patient well-appearing nontoxic no signs respiratory distress  Cardiovascular: Tachycardic but regular rhythm no murmurs gallops or rubs  Abdomen is obese, tenderness to palpation in the upper as well as mid lower abdomen. No rebound or guarding noted.  's are clear to auscultation bilaterally without any rales wheezes or rhonchi    Small bowel obstruction, tachycardia we will plan on IV fluids. Initial lactate was elevated at outlying facility at 4.0 will repeat. She Eric received dose of Zosyn. General surgery consult, and admission        EKG Interpretation    Interpreted by me    EKG shows sinus tachycardia with a ventricular rate of 134, Q waves noted in the inferior lead with inferior infarct of age undetermined, no ST elevations noted, subtle ST depressions noted in lead I and aVL.       Naldo Navas D.O, M.P.H  Attending Emergency Medicine Physician         Naldo Navas,   01/20/20 [id number]

## 2020-01-20 NOTE — ED PROVIDER NOTES
[facility] ED  Emergency Department Encounter  Emergency Medicine Resident     Pt Name: Teofilo Deshpande  MRN: [id number]  [place] 1970  Date of evaluation: 1/20/20  PCP:  TOSHA Garibay CNP    CHIEF COMPLAINT       Chief Complaint   Patient presents with   [address]  (Location/Symptom, Timing/Onset, Context/Setting, Quality, Duration, Modifying Factors,Severity.)      Teofilo Deshpande is a 47 y.o. female who presents with malaise as a transfer from [facility] for general malaise. Patient also complained of intermittent abdominal pain and has had poor p.o. intake since Friday. Upon arrival in [facility], patient was tachycardic greater than 160, hypotensive, and appeared dehydrated. EKG was concerning for STEMI based on computer read, however appears sinus tachycardic and the emergency physician at [facility] the on-call interventionist, and he does not believe this is a STEMI. Labs significant for NOLAN with creatinine of 1.83 and small bowel obstruction on CT. Patient received Zosyn for concern for intra-abdominal infection. Patient is received 3 L prior to arrival.  Patient states she is feeling well but asking for something to drink. Says sometimes she has diffuse abdominal pain, but not at this time. Medical history significant for diabetes, high blood pressure, hyperlipidemia. Patient denies any previous heart problems or stents. PAST MEDICAL / SURGICAL / SOCIAL / FAMILY HISTORY      has a past medical history of Anemia, Depression, Diabetes mellitus ([address] Hyperlipidemia, and Hypertension. has a past surgical history that includes Hysterectomy; Cholecystectomy; and Tonsillectomy.      Social History     Socioeconomic History    Marital status:      Spouse name: Not on file    Number of children: Not on file    Years of education: Not on file    Highest education level: Not on file   Occupational History    Mood and Affect: Mood normal.         Behavior: Behavior normal.         DIFFERENTIAL  DIAGNOSIS     PLAN (LABS / IMAGING / EKG):  Orders Placed This Encounter   Procedures    Troponin    CBC auto differential    Lactate, Sepsis    Troponin    Inpatient consult to General Surgery    Inpatient consult to Hospitalist    EKG 12 Lead    PATIENT STATUS (FROM ED OR OR/PROCEDURAL) Inpatient       MEDICATIONS ORDERED:  Orders Placed This Encounter   Medications    0.9 % sodium chloride bolus       DDX: Sepsis, small bowel obstruction, sinus tachycardia, gastroenteritis, ACS    Initial MDM/Plan: 47 y.o. female who presents with generalized abdominal pain, malaise, decreased p.o. intake, nausea. This started on Friday. Patient was seen at [facility] found to have small bowel obstruction on CT scan. Patient transferred here for continued evaluation. Physical exam unremarkable except for generalized abdominal tenderness. Patient has been tachycardic since resolved. Patient is at outside hospital and continues to be so here.   Will order EKG, lactate, basic labs, likely admission    DIAGNOSTIC RESULTS / EMERGENCYDEPARTMENT COURSE / MDM     LABS:  Labs Reviewed   TROPONIN - Abnormal; Notable for the following components:       Result Value    Troponin, High Sensitivity 25 (*)     All other components within normal limits   CBC WITH AUTO DIFFERENTIAL - Abnormal; Notable for the following components:    RBC 5.64 (*)     Hematocrit 47.6 (*)     Seg Neutrophils 73 (*)     Lymphocytes 15 (*)     All other components within normal limits   LACTATE, SEPSIS - Abnormal; Notable for the following components:    Lactic Acid, Sepsis, Whole Blood 3.3 (*)     All other components within normal limits   LACTATE, SEPSIS - Abnormal; Notable for the following components:    Lactic Acid, Sepsis, Whole Blood 3.1 (*)     All other components within normal limits   TROPONIN - Abnormal; Notable for the following components:    Troponin, High Sensitivity 21 (*)     All other components within normal limits         RADIOLOGY:  Xr Chest Portable    Result Date: 1/19/2020  XR CHEST, 1 VIEW  ([id number]) CLINICAL HISTORY:  Reason for exam:->sepsis COMPARISON:  No relevant prior studies available. FINDINGS:   LUNGS:  NO evidence of acute findings. No consolidation. PLEURAL SPACE:  No pneumothorax. HEART:  NO evidence of acute findings. No cardiomegaly. MEDIASTINUM:  No gross abnormality. BONES/JOINTS:  NO definite fracture or dislocation.     - NO evidence of acute disease/changes. Cta Chest Abdomen Pelvis W Contrast    Result Date: 1/19/2020  CT ANGIOGRAPHY CHEST WITH INTRAVENOUS CONTRAST  ([id number]). CT ANGIOGRAPHY ABDOMEN AND PELVIS WITH INTRAVENOUS CONTRAST  ([id number]). CLINICAL HISTORY:  Reason for exam:->chest pain, hypotensive TECHNIQUE:  Axial computed tomographic angiography images of the chest, abdomen and pelvis with intravenous contrast using CT angiography protocol. Sagittal and coronal reformatted images were created and reviewed. This CT exam was performed using one or more of the following dose reduction techniques:  automated exposure control, adjustment of the mA and/or kV according to patient size, and/or use of iterative reconstruction technique. MIP reconstructed images were created and reviewed. CONTRAST:  75 mL of Isovue 370. administered intravenously. COMPARISON:  No relevant prior studies available. FINDINGS: AORTA:  The thoracic aorta demonstrates NO evidence of aneurysm or rupture. . NO dissection. The abdominal aorta demonstrates NO evidence of aneurysm or rupture. NO  NO dissection. PULMONARY ARTERIES:  NO gross evidence of embolus in the central/main n pulmonary arteries. More peripheral vessels NOT evaluated. Study not performed for evaluation of pulmonary embolus. GREAT VESSELS OF AORTIC ARCH:  NO evidence of acute disease/changes. No o dissection. No arterial occlusion or significant stenosis.  CELIAC TRUNK AND MESENTERIC ARTERIES:  NO evidence of acute disease/changes. No occlusion or significant stenosis. RENAL ARTERIES:  NO evidence of acute disease/changes. No occlusion or r significant stenosis. ILIAC ARTERIES:  NO evidence of acute disease/changes. No occlusion or r significant stenosis. LUNGS:  RIGHT LUNG: The RIGHT lung demonstrates NO consolidations. LEFT LUNG: The LEFT lung demonstrates NO consolidations. PLEURAL SPACE:  NO evidence of acute findings. No significant effusion. No o pneumothorax. HEART:  The heart is normal in size and position. No significant pericardial effusion. MEDIASTINUM:  NO significantly/pathologically enlarged lymph nodes are e demonstrated. LIVER:  Moderate changes in the hepatic parenchyma consistent with fatty y infiltration. Enlargement of the liver. This measures approximately 18.3 cm in the midclavicular line. GALLBLADDER AND BILE DUCTS:  Postoperative changes are present consistent t with cholecystectomy. No ductal dilation. PANCREAS:  The visualized pancreas and pancreatic area are unremarkable. No ductal dilation. SPLEEN:  NO evidence of acute findings. No splenomegaly. ADRENALS:  The adrenal glands demonstrate NO masses or abnormal enlargement. KIDNEYS AND URETERS:  RIGHT KIDNEY: The RIGHT kidney demonstrates NO O significant hydronephrosis, renal stones or masses. LEFT KIDNEY: The LEFT kidney demonstrates NO significant hydronephrosis, is, renal stones or masses. NO definite ureteral stones. STOMACH AND BOWEL:  STOMACH: Moderate fluid distention. Minimal fluid level. SMALL BOWEL: The small bowel is moderately distended and predominantly tly fluid-filled. Air-fluid levels. Distal small bowel is less distended. Transition in the RIGHT lower quadrant. COLON: The colon demonstrates NO significant distention or abnormal mal appearing wall/mucosal thickening. Partially fecal filled colon. NO significant distention.  APPENDIX:  The appendix is Impression: no acute changes    Bee García    All EKG's are interpreted by the Emergency Department Physicianwho either signs or Co-signs this chart in the absence of a cardiologist.    EMERGENCY DEPARTMENT COURSE:  ED Course as of Jan 20 0251 Mon Jan 20, 2020   0210 Discussed the patient with Yecenia, who accepted the patient for admission. [JG]   0240 Trop stable     Troponin, High Sensitivity(!): 21 [JG]   0240 improving   Lactate, Sepsis(!):    Lactic Acid, Sepsis NOT REPORTED   Lactic Acid, Sepsis, Whole Blood 3.1(!) [JG]      ED Course User Index  [JG] Hu Watts DO          PROCEDURES:  None    CONSULTS:  IP CONSULT TO GENERAL SURGERY  IP CONSULT TO HOSPITALIST  IP CONSULT TO GENERAL SURGERY    CRITICAL CARE:  Please see attending note    FINAL IMPRESSION      1. Small bowel obstruction (HCC)    2. Elevated lactic acid level    3. Tachycardia    4.  Acute kidney injury [facility])          DISPOSITION / PLAN     DISPOSITION Admitted 01/20/2020 02:13:31 AM      PATIENT REFERRED TO:  TOSHA Mejia [facility]  [address]  [phone]            DISCHARGE MEDICATIONS:  New Prescriptions    No medications on file       Bee García DO  Emergency Medicine Resident    (Please note that portions of this note were completed with a voice recognition program.Efforts were made to edit the dictations but occasionally words are mis-transcribed.)     Bee García DO  Resident  01/20/20 1615

## 2020-01-20 NOTE — CONSULTS
Drug use: No    Sexual activity: Not Currently   Lifestyle    Physical activity:     Days per week: Not on file     Minutes per session: Not on file    Stress: Not on file   Relationships    Social connections:     Talks on phone: Not on file     Gets together: Not on file     Attends Caodaism service: Not on file     Active member of club or organization: Not on file     Attends meetings of clubs or organizations: Not on file     Relationship status: Not on file    Intimate partner violence:     Fear of current or ex partner: Not on file     Emotionally abused: Not on file     Physically abused: Not on file     Forced sexual activity: Not on file   Other Topics Concern    Not on file   Social History Narrative    ** Merged History Encounter **            Family History:   No family history on file. REVIEW OF SYSTEMS:    CONSTITUTIONAL: Denies recent weight loss, fatigue, fevers, chills  HEENT: Denies rhinorrhea, dysphagia, odynphagia  CARDIOVASCULAR: Denies history of MI, recent chest pain  RESPIRATORY: Denies recent history of shortness of breath or history of PE  GASTROINTESTINAL: Positive abdominal pain, nausea, vomiting  GENITOURINARY: Denies increased frequency or dysuria  HEMATOLOGIC/LYMPHATIC: Denies history of anemia or DVTs  ENDOCRINE: History of diabetes  NEURO: Denies history of CVA, TIA  Review of systems negative unless listed above.     PHYSICAL EXAM:    VITALS:  /88   Pulse 143   Temp 98.5 °F (36.9 °C) (Oral)   Resp 23   Ht 5' 4\" (1.626 m)   Wt 249 lb (112.9 kg)   SpO2 97%   BMI 42.74 kg/m²   INTAKE/OUTPUT:   No intake or output data in the 24 hours ending 01/20/20 0133    CONSTITUTIONAL:  awake, alert, not distressed  HEENT: Normocephalic/atraumatic, without obvious abnormality  NECK:  Supple, symmetrical, trachea midline   CARDIOVASCULAR: Tachycardic rate and rhythm  LUNGS: Unlabored respirations  ABDOMEN: Obese, soft, tender to palpation worse in the LLQ, nonperitoneal  MUSCULOSKELETAL: Muscle strength intact in all extremities bilaterally. NEUROLOGIC: CN II- XII intact. Gross motor intact without focal weakness. SKIN: No cyanosis, rashes, or edema noted. CBC with Differential:    Lab Results   Component Value Date    WBC 7.5 01/20/2020    RBC 5.64 01/20/2020    HGB 14.5 01/20/2020    HCT 47.6 01/20/2020     01/20/2020    MCV 84.4 01/20/2020    MCH 25.7 01/20/2020    MCHC 30.5 01/20/2020    RDW 14.3 01/20/2020    LYMPHOPCT 15 01/20/2020    MONOPCT 10 01/20/2020    BASOPCT 1 01/20/2020    MONOSABS 0.76 01/20/2020    LYMPHSABS 1.15 01/20/2020    EOSABS 0.06 01/20/2020    BASOSABS 0.05 01/20/2020    DIFFTYPE NOT REPORTED 01/20/2020     BMP:    Lab Results   Component Value Date     01/19/2020    K 4.3 01/19/2020    CL 91 01/19/2020    CO2 26 01/19/2020    BUN 52 01/19/2020    LABALBU 4.7 01/19/2020    CREATININE 1.83 01/19/2020    CALCIUM 11.3 01/19/2020    GFRAA 35 01/19/2020    LABGLOM 29 01/19/2020    GLUCOSE 165 01/19/2020     Lactate 3.3    Pertinent Radiology:   Impression       - SMALL BOWEL: The small bowel is moderately distended and predominantly    fluid-filled. Air-fluid levels. Distal small bowel is less distended. Transition in the RIGHT lower quadrant.  Partial small bowel obstruction.       - STOMACH: Moderate fluid distention. Minimal fluid level.       - Moderate changes in the hepatic parenchyma consistent with fatty    infiltration. Enlargement of the liver. This measures approximately 18.3 cm in    the midclavicular line.       - Partially fecal filled colon. NO significant distention.       There are incidental and/or chronic findings also present as noted. ASSESSMENT:  3 47year old female with abdominal pain, nausea, vomiting, concern for SBO    Plan:  1. Recommend medicine to admit  2. NPO  3. NGT  4. IVF  5. Repeat labs including lactate  6. Serial abdominal exams  7.  No acute surgical intervention at this time, should patient's exam change patient is aware she may need operative intervention    Patient and plan discussed with Dr Douglas Joseph, who is in agreement. Electronically signed by Jeet Rodriguez MD  on 1/20/2020 at 1:33 AM     I have reviewed the resident's note and I either performed the key elements of the medical history and physical exam or was present with the resident when the key elements of the medical history and physical exam were performed. I have discussed the findings, established the care plan and recommendations with Resident.     Electronically signed by Valeria Uribe IV, DO on 1/21/20 at 12:36 PM

## 2020-01-20 NOTE — PLAN OF CARE
Problem: Falls - Risk of:  Goal: Will remain free from falls  Description  Will remain free from falls  1/20/2020 1507 by Yajaira Najera RN  Outcome: Ongoing  1/20/2020 0602 by Nan Jacobs RN  Outcome: Ongoing  Goal: Absence of physical injury  Description  Absence of physical injury  1/20/2020 1507 by Yajaira Najera RN  Outcome: Ongoing  1/20/2020 0602 by Nan Jacobs RN  Outcome: Ongoing     Problem: Pain:  Goal: Pain level will decrease  Description  Pain level will decrease  1/20/2020 1507 by Yajaira Najera RN  Outcome: Ongoing  1/20/2020 0602 by Nan Jacobs RN  Outcome: Ongoing  Goal: Control of acute pain  Description  Control of acute pain  Outcome: Ongoing  Goal: Control of chronic pain  Description  Control of chronic pain  Outcome: Ongoing

## 2020-01-20 NOTE — PROGRESS NOTES
After attending rounds with Dr. Alexandru Harrington, pt has had X2 BM, abdominal pain has resolved. Ok for low fiber diet. If tolerating diet, would saline lock IV.      Wanda De La Vega PGY4

## 2020-01-20 NOTE — CARE COORDINATION
Case Management Initial Discharge Plan   [facility],             Met with:patient to discuss discharge plans. Information verified: address, contacts, phone number, , insurance Yes  PCP: TOSHA Franks - CNP  Date of last visit: 1 mth    Insurance Provider: [employer] [facility]    Discharge Planning    Living Arrangements:  Children, Family Members   Support Systems:  Family Members, [id number] has 2 stories  4 stairs to climb to get into front door, 4stairs to climb to reach second floor  Location of bedroom/bathroom in home bed up bath on ma3[address]    Patient able to perform ADL's:Independent    Current Services (outpatient & in home) none  DME equipment: glucometer  DME provider:       Potential Assistance Needed:  N/A    Patient agreeable to home care: No don't think I need  Freedom of choice provided:  n/a    Prior SNF/Rehab Placement and Facility: none  Agreeable to SNF/Rehab: No  Jordan of choice provided: n/a   Evaluation: no    Expected Discharge date:  20  Patient expects to be discharged to:  home  Follow Up Appointment: Best Day/ Time: Monday AM    Transportation provider: terry Styles  Transportation arrangements needed for discharge: No    Readmission Risk              Risk of Unplanned Readmission:        9             Does patient have a readmission risk score greater than 14?: No  If yes, follow-up appointment must be made within 7 days of discharge.      Goals of Care: resume adls and diet without pain or nausea    Discharge Plan: home with Dtr no needs          Electronically signed by Justyn Shine RN on 20 at 5:54 PM

## 2020-01-20 NOTE — ED NOTES
Bed: 15  Expected date: 1/20/20  Expected time: 12:11 AM  Means of arrival:   Comments:  John Spain  2060  47 yof  HypoTN, tachycardic  Black emesis, gauaic negative stool  ECG concerning, spoke with interventionalist, did not feel was STEMI  Lac 4, NOLAN Cr 1.8 doubled from baseline  IVF improved BP   CTA chest/abd/pelvis pending, flu pending  Awake and alert  Ground since LF grounded     Sury Player, RN  01/20/20 8567

## 2020-01-20 NOTE — ED NOTES
This nurse inserted indwelling painter catheter, pt almost immediately demanded to have it removed; This nurse made physician aware and physician stated that this nurse may remove painter catheter;  This nurse removed catheter;      Reshma Arita RN  01/19/20 7998

## 2020-01-20 NOTE — PROGRESS NOTES
Pt's HR was in 140s down in the ER and when pt arrived to unit. Currently is at 133. BP is 133/85. HARSH Medina notified via [employer]. No new orders. Will continue to monitor.

## 2020-01-20 NOTE — H&P
20    Calcium 11.3 (H) 8.6 - 10.4 mg/dL    Sodium 137 135 - 144 mmol/L    Potassium 4.3 3.7 - 5.3 mmol/L    Chloride 91 (L) 98 - 107 mmol/L    CO2 26 20 - 31 mmol/L    Anion Gap 20 (H) 9 - 17 mmol/L    Alkaline Phosphatase 133 (H) 35 - 104 U/L    ALT 51 (H) 5 - 33 U/L    AST 32 (H) <32 U/L    Total Bilirubin 0.56 0.30 - 1.20 mg/dL    Total Protein 8.8 (H) 6.4 - 8.3 g/dL    Alb 4.7 3.5 - 5.2 g/dL    Albumin/Globulin Ratio NOT REPORTED 1.0 - 2.5    GFR Non-African American 29 (L) >60 mL/min    GFR  35 (L) >60 mL/min    GFR Comment          GFR Staging NOT REPORTED    Lactic Acid    Collection Time: 01/19/20  8:15 PM   Result Value Ref Range    Lactic Acid 4.0 (H) 0.5 - 2.2 mmol/L   EKG 12 Lead    Collection Time: 01/19/20  8:24 PM   Result Value Ref Range    Ventricular Rate 167 BPM    Atrial Rate 167 BPM    P-R Interval 112 ms    QRS Duration 86 ms    Q-T Interval 302 ms    QTc Calculation (Bazett) 503 ms    P Axis 83 degrees    R Axis 58 degrees    T Axis 69 degrees   Lipase    Collection Time: 01/19/20  8:27 PM   Result Value Ref Range    Lipase 31 13 - 60 U/L   Protime-INR    Collection Time: 01/19/20  8:27 PM   Result Value Ref Range    Protime 9.3 9.0 - 11.6 sec    INR 0.9    Troponin    Collection Time: 01/19/20  8:27 PM   Result Value Ref Range    Troponin, High Sensitivity NOT REPORTED 0 - 14 ng/L    Troponin T <0.03 <0.03 ng/mL    Troponin Interp         TSH without Reflex    Collection Time: 01/19/20  8:27 PM   Result Value Ref Range    TSH 1.00 0.30 - 5.00 mIU/L   TYPE AND SCREEN    Collection Time: 01/19/20  8:28 PM   Result Value Ref Range    Expiration Date 01/22/2020,2359     Arm Band Number NOT REPORTED     ABO/Rh A POSITIVE     Antibody Screen NEGATIVE    Culture blood #1    Collection Time: 01/19/20  8:30 PM   Result Value Ref Range    Specimen Description . BLOOD     Special Requests 10ML     Culture NO GROWTH 8 HOURS    Culture blood #2    Collection Time: 01/19/20  8:30 PM   Result Value Ref Range    Specimen Description . BLOOD     Special Requests 10ML     Culture NO GROWTH 8 HOURS    Rapid influenza A/B antigens    Collection Time: 01/19/20  8:30 PM   Result Value Ref Range    Specimen Description . NASOPHARYNGEAL SWAB     Special Requests NOT REPORTED     Direct Exam NEGATIVE for Influenza A Antigen     Direct Exam NEGATIVE for Influenza B Antigen    Urinalysis, reflex to microscopic    Collection Time: 01/19/20  8:30 PM   Result Value Ref Range    Color, UA YELLOW YELLOW    Turbidity UA CLEAR CLEAR    Glucose, Ur NEGATIVE NEGATIVE    Bilirubin Urine NEGATIVE  Verified by ictotest. (A) NEGATIVE    Ketones, Urine TRACE (A) NEGATIVE    Specific Gravity, UA 1.025 1.005 - 1.030    Urine Hgb TRACE (A) NEGATIVE    pH, UA 5.0 5.0 - 8.0    Protein, UA 1+ (A) NEGATIVE    Urobilinogen, Urine Normal Normal    Nitrite, Urine NEGATIVE NEGATIVE    Leukocyte Esterase, Urine 1+ (A) NEGATIVE    Urinalysis Comments         Microscopic Urinalysis    Collection Time: 01/19/20  8:30 PM   Result Value Ref Range    -          WBC, UA 2 TO 5 0 /HPF    RBC, UA 2 TO 5 0 - 2 /HPF    Casts UA NOT REPORTED /LPF    Crystals UA NOT REPORTED None /HPF    Epithelial Cells UA 0 TO 2 /HPF    Renal Epithelial, Urine NOT REPORTED 0 /HPF    Bacteria, UA NOT REPORTED None    Mucus, UA NOT REPORTED None    Trichomonas, UA NOT REPORTED None    Amorphous, UA NOT REPORTED None    Other Observations UA NOT REPORTED NOT REQ.     Yeast, UA NOT REPORTED None   POCT Blood Occult Stool    Collection Time: 01/19/20  8:35 PM   Result Value Ref Range    OCCULT BLOOD FECAL negative    EKG 12 Lead    Collection Time: 01/19/20  9:11 PM   Result Value Ref Range    Ventricular Rate 155 BPM    Atrial Rate 155 BPM    P-R Interval 100 ms    QRS Duration 90 ms    Q-T Interval 276 ms    QTc Calculation (Bazett) 443 ms    P Axis 65 degrees    R Axis 57 degrees    T Axis 78 degrees   EKG 12 Lead    Collection Time: 01/20/20 12:17 AM   Result Value Ref Range    Ventricular Rate 134 BPM    Atrial Rate 134 BPM    P-R Interval 138 ms    QRS Duration 90 ms    Q-T Interval 326 ms    QTc Calculation (Bazett) 486 ms    P Axis 50 degrees    R Axis 57 degrees    T Axis 96 degrees   Troponin    Collection Time: 01/20/20 12:22 AM   Result Value Ref Range    Troponin, High Sensitivity 25 (H) 0 - 14 ng/L    Troponin T NOT REPORTED <0.03 ng/mL    Troponin Interp NOT REPORTED    CBC auto differential    Collection Time: 01/20/20  1:01 AM   Result Value Ref Range    WBC 7.5 3.5 - 11.3 k/uL    RBC 5.64 (H) 3.95 - 5.11 m/uL    Hemoglobin 14.5 11.9 - 15.1 g/dL    Hematocrit 47.6 (H) 36.3 - 47.1 %    MCV 84.4 82.6 - 102.9 fL    MCH 25.7 25.2 - 33.5 pg    MCHC 30.5 28.4 - 34.8 g/dL    RDW 14.3 11.8 - 14.4 %    Platelets 443 019 - 315 k/uL    MPV 9.5 8.1 - 13.5 fL    NRBC Automated 0.0 0.0 per 100 WBC    Differential Type NOT REPORTED     Seg Neutrophils 73 (H) 36 - 65 %    Lymphocytes 15 (L) 24 - 43 %    Monocytes 10 3 - 12 %    Eosinophils % 1 1 - 4 %    Basophils 1 0 - 2 %    Immature Granulocytes 0 0 %    Segs Absolute 5.43 1.50 - 8.10 k/uL    Absolute Lymph # 1.15 1.10 - 3.70 k/uL    Absolute Mono # 0.76 0.10 - 1.20 k/uL    Absolute Eos # 0.06 0.00 - 0.44 k/uL    Basophils Absolute 0.05 0.00 - 0.20 k/uL    Absolute Immature Granulocyte 0.03 0.00 - 0.30 k/uL    WBC Morphology NOT REPORTED     RBC Morphology NOT REPORTED     Platelet Estimate NOT REPORTED    Lactate, Sepsis    Collection Time: 01/20/20  1:01 AM   Result Value Ref Range    Lactic Acid, Sepsis NOT REPORTED 0.5 - 1.9 mmol/L    Lactic Acid, Sepsis, Whole Blood 3.3 (H) 0.5 - 1.9 mmol/L   Lactate, Sepsis    Collection Time: 01/20/20  2:11 AM   Result Value Ref Range    Lactic Acid, Sepsis NOT REPORTED 0.5 - 1.9 mmol/L    Lactic Acid, Sepsis, Whole Blood 3.1 (H) 0.5 - 1.9 mmol/L   Troponin    Collection Time: 01/20/20  2:11 AM   Result Value Ref Range    Troponin, High Sensitivity 21 (H) 0 - 14 ng/L    Troponin T NOT

## 2020-01-21 VITALS
OXYGEN SATURATION: 98 % | HEART RATE: 96 BPM | SYSTOLIC BLOOD PRESSURE: 131 MMHG | DIASTOLIC BLOOD PRESSURE: 81 MMHG | WEIGHT: 249 LBS | BODY MASS INDEX: 42.51 KG/M2 | RESPIRATION RATE: 20 BRPM | HEIGHT: 64 IN | TEMPERATURE: 98.3 F

## 2020-01-21 LAB
AMYLASE: 28 U/L (ref 28–100)
CULTURE: NO GROWTH
HCT VFR BLD CALC: 38.7 % (ref 36.3–47.1)
HEMOGLOBIN: 11.5 G/DL (ref 11.9–15.1)
LIPASE: 31 U/L (ref 13–60)
Lab: NORMAL
MAGNESIUM: 1.9 MG/DL (ref 1.6–2.6)
MCH RBC QN AUTO: 26.3 PG (ref 25.2–33.5)
MCHC RBC AUTO-ENTMCNC: 29.7 G/DL (ref 28.4–34.8)
MCV RBC AUTO: 88.4 FL (ref 82.6–102.9)
NRBC AUTOMATED: 0 PER 100 WBC
PDW BLD-RTO: 14.4 % (ref 11.8–14.4)
PHOSPHORUS: 1.4 MG/DL (ref 2.6–4.5)
PLATELET # BLD: 194 K/UL (ref 138–453)
PMV BLD AUTO: 9.6 FL (ref 8.1–13.5)
RBC # BLD: 4.38 M/UL (ref 3.95–5.11)
SPECIMEN DESCRIPTION: NORMAL
WBC # BLD: 5.6 K/UL (ref 3.5–11.3)

## 2020-01-21 PROCEDURE — G0378 HOSPITAL OBSERVATION PER HR: HCPCS

## 2020-01-21 PROCEDURE — 6360000002 HC RX W HCPCS: Performed by: NURSE PRACTITIONER

## 2020-01-21 PROCEDURE — 85027 COMPLETE CBC AUTOMATED: CPT

## 2020-01-21 PROCEDURE — 96372 THER/PROPH/DIAG INJ SC/IM: CPT

## 2020-01-21 PROCEDURE — 83690 ASSAY OF LIPASE: CPT

## 2020-01-21 PROCEDURE — 84100 ASSAY OF PHOSPHORUS: CPT

## 2020-01-21 PROCEDURE — 6370000000 HC RX 637 (ALT 250 FOR IP): Performed by: INTERNAL MEDICINE

## 2020-01-21 PROCEDURE — 99238 HOSP IP/OBS DSCHRG MGMT 30/<: CPT | Performed by: INTERNAL MEDICINE

## 2020-01-21 PROCEDURE — 2500000003 HC RX 250 WO HCPCS: Performed by: NURSE PRACTITIONER

## 2020-01-21 PROCEDURE — 36415 COLL VENOUS BLD VENIPUNCTURE: CPT

## 2020-01-21 PROCEDURE — 83735 ASSAY OF MAGNESIUM: CPT

## 2020-01-21 PROCEDURE — 82150 ASSAY OF AMYLASE: CPT

## 2020-01-21 PROCEDURE — 96376 TX/PRO/DX INJ SAME DRUG ADON: CPT

## 2020-01-21 RX ADMIN — PREGABALIN 75 MG: 75 CAPSULE ORAL at 08:46

## 2020-01-21 RX ADMIN — PRAMIPEXOLE DIHYDROCHLORIDE 1 MG: 0.25 TABLET ORAL at 08:47

## 2020-01-21 RX ADMIN — OMEPRAZOLE 20 MG: 20 CAPSULE, DELAYED RELEASE ORAL at 08:47

## 2020-01-21 RX ADMIN — POTASSIUM CHLORIDE, DEXTROSE MONOHYDRATE AND SODIUM CHLORIDE: 150; 5; 450 INJECTION, SOLUTION INTRAVENOUS at 03:13

## 2020-01-21 RX ADMIN — SERTRALINE 100 MG: 50 TABLET, FILM COATED ORAL at 08:47

## 2020-01-21 RX ADMIN — FAMOTIDINE 20 MG: 10 INJECTION, SOLUTION INTRAVENOUS at 08:48

## 2020-01-21 RX ADMIN — ENOXAPARIN SODIUM 40 MG: 40 INJECTION SUBCUTANEOUS at 08:48

## 2020-01-21 RX ADMIN — CYCLOBENZAPRINE HYDROCHLORIDE 5 MG: 5 TABLET, FILM COATED ORAL at 08:46

## 2020-01-21 ASSESSMENT — ENCOUNTER SYMPTOMS
ABDOMINAL PAIN: 0
BLOOD IN STOOL: 0
WHEEZING: 0
VOMITING: 0
BACK PAIN: 0
NAUSEA: 0
EYE DISCHARGE: 0
SHORTNESS OF BREATH: 0
COLOR CHANGE: 0

## 2020-01-21 ASSESSMENT — PAIN SCALES - GENERAL: PAINLEVEL_OUTOF10: 0

## 2020-01-21 NOTE — PLAN OF CARE
Problem: Falls - Risk of:  Goal: Will remain free from falls  Description  Will remain free from falls  1/21/2020 0411 by Callum Terry RN  Outcome: Ongoing  1/20/2020 1507 by Aj Vera RN  Outcome: Ongoing  Goal: Absence of physical injury  Description  Absence of physical injury  1/21/2020 0411 by Callum Terry RN  Outcome: Ongoing  1/20/2020 1507 by Aj Vera RN  Outcome: Ongoing

## 2020-01-21 NOTE — PROGRESS NOTES
Per general surgery resident, Dr. Nghia Miles, patient ok to discharge home. No follow up with Dr. Ricardo Paul is needed. Dr. Antoine Fenton @ nurse's station and advised of above.

## 2020-01-21 NOTE — PROGRESS NOTES
[facility]    Progress Note    1/21/2020    12:26 PM    Name:   Lorraine Lim  MRN:     [id number]     Acct:      [de-identified]   Room:   [address] [place] [address] Day:  1  Admit Date:  1/20/2020 12:14 AM    PCP:   TOSHA Vogel CNP  Code Status:  Full Code    Subjective:     C/C:   Chief Complaint   Patient presents with   Berton Kelp     Interval History Status: significantly improved. Doing well  Had 3 BMs  Tolerating regular diet    Brief History:   Lorraine Lim is a 47 y.o. Non-/non  female with remote hysterectomy, umbilical hernia repair as well has cholecystectomy. who presents to the ED with complaints of abdominal pain, nausea and emesis. Daughter reports this has been going on for 4 days. She reports that initially they thought it was just a \"flu in the stomach but got worse over time. Within the last day the patient has begun having dark emesis which they think contains blood. She reports she has had colonoscopy and EGD about 5 years ago. She can not remember reason why. She is Passing gas. Last BM was Thursday 1/16/2020 . Denies fever, chills, chest pain, shortness of breath.     On arrival to the ED, she was vomiting. Work up show stable vital but dehydrated. CT abdomen show SBO. Labs shows NOLAN. Surgery has seen patient and recommend conservative treatment with NGT which patient refused. She had one episode of emesis in the unit this morning. She is asking for water to drink because she is thirsty        Review of Systems:     Review of Systems   Constitutional: Negative for appetite change and fever. HENT: Negative for congestion and ear discharge. Eyes: Negative for discharge and visual disturbance. Respiratory: Negative for shortness of breath and wheezing. Cardiovascular: Negative for chest pain, palpitations and leg swelling.    Gastrointestinal: Negative for abdominal pain, blood in stool, nausea and vomiting. Endocrine: Negative for cold intolerance and heat intolerance. Genitourinary: Negative for dysuria and frequency. Musculoskeletal: Negative for arthralgias, back pain and joint swelling. Skin: Negative for color change and rash. Neurological: Negative for dizziness, tremors, seizures and light-headedness. Psychiatric/Behavioral: Negative for agitation and confusion. Medications: Allergies: Allergies   Allergen Reactions    Seasonal        Current Meds:   Scheduled Meds:    sodium chloride flush  10 mL Intravenous 2 times per day    famotidine (PEPCID) injection  20 mg Intravenous Daily    enoxaparin  40 mg Subcutaneous Daily    pramipexole  1 mg Oral TID    cyclobenzaprine  5 mg Oral BID    sertraline  100 mg Oral Daily    pregabalin  75 mg Oral BID    omeprazole  20 mg Oral Daily     Continuous Infusions:    dextrose 5% and 0.45% NaCl with KCl 20 mEq 125 mL/hr at 20 0313     PRN Meds: sodium chloride flush, potassium chloride **OR** potassium alternative oral replacement **OR** potassium chloride, magnesium sulfate, magnesium hydroxide, ondansetron, nicotine, acetaminophen, morphine **OR** morphine    Data:     Past Medical History:   has a past medical history of Anemia, Depression, Diabetes mellitus ([address] Hyperlipidemia, and Hypertension. Social History:   reports that she has never smoked. She has never used smokeless tobacco. She reports that she does not drink alcohol or use drugs. Family History: No family history on file. Vitals:  /81   Pulse 96   Temp 98.3 °F (36.8 °C) (Oral)   Resp 20   Ht 5' 4\" (1.626 m)   Wt 249 lb (112.9 kg)   SpO2 98%   BMI 42.74 kg/m²   Temp (24hrs), Av.5 °F (36.9 °C), Min:98.3 °F (36.8 °C), Max:98.6 °F (37 °C)    No results for input(s): POCGLU in the last 72 hours. I/O (24Hr):     Intake/Output Summary (Last 24 hours) at 2020 1226  Last data filed at 2020 0616  Gross per 24 hour rales.   Abdominal:      General: Bowel sounds are normal. There is no distension. Palpations: Abdomen is soft. Tenderness: There is no tenderness. Musculoskeletal: Normal range of motion. General: No swelling or tenderness. Skin:     General: Skin is warm and dry. Findings: No rash. Neurological:      General: No focal deficit present. Mental Status: She is alert and oriented to person, place, and time. Psychiatric:         Mood and Affect: Mood normal.         Thought Content: Thought content normal.         Judgment: Judgment normal.         Assessment:        Hospital Problems           Last Modified POA    * (Principal) SBO (small bowel obstruction) ([address] 1/20/2020 Yes    NOLAN (acute kidney injury) ([address] 1/20/2020 Yes    Type 2 diabetes mellitus without complication, without long-term current use of insulin ([address] 1/20/2020 Yes    Morbid obesity due to excess calories ([address] 1/20/2020 Yes          Plan:      1. SBO. Resolved, tolerating regular diet. Stop IVF. 2. NOLAN due to dehydration. Resolved. Continue oral hydration. 3. DM-2. Controlled. Monitor blood sugar daily  4. Morbid obesity. Counseled  5. Discharge planning - lives around [place] with family.  Home today if no further input from surgery        Thi Gómez MD  1/21/2020  12:26 PM

## 2020-01-21 NOTE — DISCHARGE SUMMARY
Marlo Nunez 19    Discharge Summary     Patient ID: Faith Weber  :  1970   MRN: [id number]     ACCOUNT:  [de-identified]   Patient's PCP: TOSHA Barrera CNP  Admit Date: 2020   Discharge Date: 2020     Length of Stay: 1  Code Status:  Full Code  Admitting Physician: Leticia Bowden MD  Discharge Physician: Leticia Bowden MD     Active Discharge Diagnoses:     Hospital Problem Lists:  Principal Problem:    SBO (small bowel obstruction) ([address]  Active Problems:    NOLAN (acute kidney injury) ([address]    Type 2 diabetes mellitus without complication, without long-term current use of insulin ([facility] [facility]    Morbid obesity due to excess calories ([address]  Resolved Problems:    * No resolved hospital problems. *      Admission Condition:  fair     Discharged Condition: good    Hospital Stay:     Hospital Course:    Faith Weber is a 47 y.o. female with remote hysterectomy, umbilical hernia repair as well has cholecystectomy. who presents to the ED with complaints of abdominal pain, nausea and emesis. Daughter reports this has been going on for 4 days. She reports that initially they thought it was just a \"flu in the stomach but got worse over time. Within the last day the patient has begun having dark emesis which they think contains blood. She reports she has had colonoscopy and EGD about 5 years ago. She can not remember reason why. She is Passing gas. Last BM was 2020 . Denies fever, chills, chest pain, shortness of breath.     On arrival to the ED, she was vomiting. Work up show stable vital but dehydrated. CT abdomen show SBO. Labs shows NOLAN. Surgery has seen patient and recommend conservative treatment with NGT which patient refused. She had one episode of emesis in the unit this morning. She is asking for water to drink because she is thirsty     Today she is doing well.  Diet advanced to regular and she is tolerated it well. She has bowel movement. No blood in her stool. She denies any abdominal pain. Her NOLAN has improved with IVF as noted in the lab section. Significant therapeutic interventions:   1. Small Bowel Obstruction. Resolved tammy conservative treatment. Tolerating regular diet. 2. NOLAN due to dehydration. Resolving. Repeat BMP in 5 days. Stop Diclofenac. .   3. DM-2. Controlled. Resume oral hypoglycemics. 4. Hypertension. Controlled. Resume Lisinopril  5. Discharge planning - home today  6. Follow up - primary care provider in 5-7 days     Significant Diagnostic Studies:   Labs / Micro:  CBC:   Lab Results   Component Value Date    WBC 5.6 01/21/2020    RBC 4.38 01/21/2020    HGB 11.5 01/21/2020    HCT 38.7 01/21/2020    MCV 88.4 01/21/2020    MCH 26.3 01/21/2020    MCHC 29.7 01/21/2020    RDW 14.4 01/21/2020     01/21/2020     BMP:    Lab Results   Component Value Date    GLUCOSE 162 01/20/2020     01/20/2020    K 4.2 01/20/2020     01/20/2020    CO2 20 01/20/2020    ANIONGAP 16 01/20/2020    BUN 46 01/20/2020    CREATININE 1.02 01/20/2020    BUNCRER NOT REPORTED 01/20/2020    CALCIUM 8.9 01/20/2020    LABGLOM 57 01/20/2020    GFRAA >60 01/20/2020    GFR      01/20/2020    GFR NOT REPORTED 01/20/2020        Radiology:  Xr Chest Portable    Result Date: 1/19/2020  - NO evidence of acute disease/changes. Cta Chest Abdomen Pelvis W Contrast    Result Date: 1/19/2020  - SMALL BOWEL: The small bowel is moderately distended and predominantly fluid-filled. Air-fluid levels. Distal small bowel is less distended. Transition in the RIGHT lower quadrant. Partial small bowel obstruction. - STOMACH: Moderate fluid distention. Minimal fluid level. - Moderate changes in the hepatic parenchyma consistent with fatty infiltration. Enlargement of the liver. This measures approximately 18.3 cm in the midclavicular line. - Partially fecal filled colon. NO significant distention.  There are incidental and/or chronic findings also present as noted. Consultations:    Consults:     Final Specialist Recommendations/Findings:   IP CONSULT TO GENERAL SURGERY  IP CONSULT TO HOSPITALIST  IP CONSULT TO IV TEAM      The patient was seen and examined on day of discharge and this discharge summary is in conjunction with any daily progress note from day of discharge. Discharge plan:     Disposition: Home    Physician Follow Up:     Marilee Mojica, APRN - CNP  [address] [id number] [phone]    Go in 3 days         Requiring Further Evaluation/Follow Up POST HOSPITALIZATION/Incidental Findings: NOLAN, need BMP in 5 days    Diet: regular diet    Activity: As tolerated    Instructions to Patient:  Increase fiber in diet, do not take Motrin, Naprosyn, Aleve - the can damage your kidneys. Discharge Medications:      Medication List      CONTINUE taking these medications    blood glucose monitor kit and supplies  Check daily     blood glucose test strips  Check daily     cyclobenzaprine 10 MG tablet  Commonly known as:  FLEXERIL  take 1 tablet by mouth nightly if needed for muscle spasm     Lancets Misc  Check blood sugar daily     lisinopril 40 MG tablet  Commonly known as:  PRINIVIL;ZESTRIL  take 1 tablet by mouth daily     loratadine 10 MG tablet  Commonly known as:  Claritin  Take 1 tablet by mouth daily     metFORMIN 500 MG extended release tablet  Commonly known as:  GLUCOPHAGE-XR  Take 1 tablet by mouth daily (with breakfast)     omeprazole 20 MG delayed release capsule  Commonly known as:  PRILOSEC  Take 1 capsule by mouth daily     pramipexole 1 MG tablet  Commonly known as:  MIRAPEX     pregabalin 75 MG capsule  Commonly known as:  LYRICA  Take 1 capsule by mouth 2 times daily for 60 days. Notes to patient:  End date of 1/25/2020. Follow up with your Primary Care Physician about this medication.      RA One Daily Essential Tabs  take 1 tablet by mouth daily sertraline 100 MG tablet  Commonly known as:  ZOLOFT  Take 1 tablet by mouth daily     simvastatin 40 MG tablet  Commonly known as:  ZOCOR  Take 1 tablet by mouth nightly     vitamin D 50 MCG (2000 UT) Tabs tablet  Take 1 tablet by mouth daily        STOP taking these medications    diclofenac 50 MG EC tablet  Commonly known as:  VOLTAREN            No discharge procedures on file. Time Spent on discharge is  20 mins in patient examination, evaluation, counseling as well as medication reconciliation, prescriptions for required medications, discharge plan and follow up. Electronically signed by   Renzo Loza MD  1/21/2020  12:37 PM      Thank you Dr. Jhony Rojas, APRN - CNP for the opportunity to be involved in this patient's care.

## 2020-01-21 NOTE — PROGRESS NOTES
Contacted On-Call, [facility] to let them know of patient BP of 148/94 and , will continue to monitor

## 2020-01-22 ENCOUNTER — TELEPHONE (OUTPATIENT)
Dept: FAMILY MEDICINE CLINIC | Age: 50
End: 2020-01-22

## 2020-01-23 ENCOUNTER — OFFICE VISIT (OUTPATIENT)
Dept: FAMILY MEDICINE CLINIC | Age: 50
End: 2020-01-23
Payer: COMMERCIAL

## 2020-01-23 VITALS
BODY MASS INDEX: 43.08 KG/M2 | OXYGEN SATURATION: 96 % | HEART RATE: 100 BPM | WEIGHT: 251 LBS | TEMPERATURE: 98 F | SYSTOLIC BLOOD PRESSURE: 128 MMHG | DIASTOLIC BLOOD PRESSURE: 80 MMHG

## 2020-01-23 PROCEDURE — 99215 OFFICE O/P EST HI 40 MIN: CPT | Performed by: NURSE PRACTITIONER

## 2020-01-23 PROCEDURE — 1111F DSCHRG MED/CURRENT MED MERGE: CPT | Performed by: NURSE PRACTITIONER

## 2020-01-23 NOTE — PROGRESS NOTES
pramipexole (MIRAPEX) 1 MG tablet  Take 1 mg by mouth 3 times daily             pregabalin (LYRICA) 75 MG capsule  Take 1 capsule by mouth 2 times daily for 60 days. sertraline (ZOLOFT) 100 MG tablet  Take 1 tablet by mouth daily             simvastatin (ZOCOR) 40 MG tablet  Take 1 tablet by mouth nightly                   Medications marked \"taking\" at this time  Outpatient Medications Marked as Taking for the 1/23/20 encounter (Office Visit) with TOSHA Zeng - CNP   Medication Sig Dispense Refill    pramipexole (MIRAPEX) 1 MG tablet Take 1 mg by mouth 3 times daily      Multiple Vitamin (RA ONE DAILY ESSENTIAL) TABS take 1 tablet by mouth daily 30 tablet 3    pregabalin (LYRICA) 75 MG capsule Take 1 capsule by mouth 2 times daily for 60 days. 60 capsule 1    metFORMIN (GLUCOPHAGE-XR) 500 MG extended release tablet Take 1 tablet by mouth daily (with breakfast) 30 tablet 2    omeprazole (PRILOSEC) 20 MG delayed release capsule Take 1 capsule by mouth daily 30 capsule 5    loratadine (CLARITIN) 10 MG tablet Take 1 tablet by mouth daily 30 tablet 5    sertraline (ZOLOFT) 100 MG tablet Take 1 tablet by mouth daily 30 tablet 5    simvastatin (ZOCOR) 40 MG tablet Take 1 tablet by mouth nightly 30 tablet 5    Cholecalciferol (VITAMIN D) 2000 units TABS tablet Take 1 tablet by mouth daily 30 tablet 5    lisinopril (PRINIVIL;ZESTRIL) 40 MG tablet take 1 tablet by mouth daily 30 tablet 5    cyclobenzaprine (FLEXERIL) 10 MG tablet take 1 tablet by mouth nightly if needed for muscle spasm 30 tablet 5        Medications patient taking as of now reconciled against medications ordered at time of hospital discharge: Yes    Chief Complaint   Patient presents with    Follow-Up from Hospital     Patient here today for follow up after hospital discharge from [facility]. She was admitted on 1/20/20 and discharged on 1/21/20. Patient was dx with small bowel obstruction.  She said she is feeling better but she does feel wiped out. History of Present illness - Follow up of Hospital diagnosis(es): Patient is a 23-year-old female who reports for hospital follow-up. Patient was at home on Sunday, 1/20/2020 and started having abdominal pain and started to vomit stool. Patient reported to the [address] and was transferred to [facility]. Patient was treated with IV fluids and ambulation. Small bowel obstruction did release and patient was able to have a bowel movements. Patient reports today that her abdominal pain is gone and that she is having regular bowel movements. Patient states that she has a decreased appetite and fatigue. Denies any fevers or vomiting. Inpatient course: Discharge summary reviewed- see chart. Interval history/Current status:     A comprehensive review of systems was negative except for what was noted in the HPI. Vitals:    01/23/20 0905   BP: 128/80   Pulse: 100   Temp: 98 °F (36.7 °C)   TempSrc: Oral   SpO2: 96%   Weight: 251 lb (113.9 kg)     Body mass index is 43.08 kg/m².    Wt Readings from Last 3 Encounters:   01/23/20 251 lb (113.9 kg)   01/20/20 249 lb (112.9 kg)   01/19/20 249 lb 4.8 oz (113.1 kg)     BP Readings from Last 3 Encounters:   01/23/20 128/80   01/21/20 131/81   01/19/20 126/75        Physical Exam:  General Appearance: alert and oriented to person, place and time, well developed and well- nourished, in no acute distress  Skin: warm and dry, no rash or erythema  Head: normocephalic and atraumatic  Eyes: pupils equal, round, and reactive to light, extraocular eye movements intact, conjunctivae normal  ENT: tympanic membrane, external ear and ear canal normal bilaterally, nose without deformity, nasal mucosa and turbinates normal without polyps  Neck: supple and non-tender without mass, no thyromegaly or thyroid nodules, no cervical lymphadenopathy  Pulmonary/Chest: clear to auscultation bilaterally- no wheezes, rales or rhonchi, normal air movement,

## 2020-01-25 LAB
CULTURE: NORMAL
CULTURE: NORMAL
Lab: NORMAL
Lab: NORMAL
SPECIMEN DESCRIPTION: NORMAL
SPECIMEN DESCRIPTION: NORMAL

## 2020-01-27 ENCOUNTER — TELEPHONE (OUTPATIENT)
Dept: FAMILY MEDICINE CLINIC | Age: 50
End: 2020-01-27

## 2020-01-27 NOTE — TELEPHONE ENCOUNTER
Patient notified that she needs to have xray done. She also is worried that she may have UTI. She said she had a painter in for a short period when she was admitted.  Some burning when she urinates  Will you order UA to be done at hospital?  Order pending if ok

## 2020-01-27 NOTE — TELEPHONE ENCOUNTER
Mikael,  Please advise before I call this patient . Not sure if she is candidate for bowel resection?

## 2020-01-28 ENCOUNTER — HOSPITAL ENCOUNTER (OUTPATIENT)
Age: 50
Discharge: HOME | End: 2020-01-28
Payer: COMMERCIAL

## 2020-01-28 ENCOUNTER — HOSPITAL ENCOUNTER (OUTPATIENT)
Age: 50
Discharge: HOME | End: 2020-01-30
Payer: COMMERCIAL

## 2020-01-28 ENCOUNTER — HOSPITAL ENCOUNTER (OUTPATIENT)
Dept: GENERAL RADIOLOGY | Age: 50
Discharge: HOME | End: 2020-01-30
Payer: COMMERCIAL

## 2020-01-28 LAB
-: ABNORMAL
AMORPHOUS: ABNORMAL
ANION GAP SERPL CALCULATED.3IONS-SCNC: 11 MMOL/L (ref 9–17)
BACTERIA: ABNORMAL
BILIRUBIN URINE: NEGATIVE
BUN BLDV-MCNC: 11 MG/DL (ref 6–20)
BUN/CREAT BLD: 16 (ref 9–20)
CALCIUM SERPL-MCNC: 9.3 MG/DL (ref 8.6–10.4)
CASTS UA: ABNORMAL /LPF
CHLORIDE BLD-SCNC: 107 MMOL/L (ref 98–107)
CO2: 27 MMOL/L (ref 20–31)
COLOR: YELLOW
COMMENT UA: ABNORMAL
CREAT SERPL-MCNC: 0.68 MG/DL (ref 0.5–0.9)
CRYSTALS, UA: ABNORMAL /HPF
EPITHELIAL CELLS UA: ABNORMAL /HPF
GFR AFRICAN AMERICAN: >60 ML/MIN
GFR NON-AFRICAN AMERICAN: >60 ML/MIN
GFR SERPL CREATININE-BSD FRML MDRD: ABNORMAL ML/MIN/{1.73_M2}
GFR SERPL CREATININE-BSD FRML MDRD: ABNORMAL ML/MIN/{1.73_M2}
GLUCOSE BLD-MCNC: 101 MG/DL (ref 70–99)
GLUCOSE URINE: NEGATIVE
KETONES, URINE: NEGATIVE
LEUKOCYTE ESTERASE, URINE: NEGATIVE
MUCUS: ABNORMAL
NITRITE, URINE: NEGATIVE
OTHER OBSERVATIONS UA: ABNORMAL
PH UA: 6 (ref 5–8)
POTASSIUM SERPL-SCNC: 4.1 MMOL/L (ref 3.7–5.3)
PROTEIN UA: ABNORMAL
RBC UA: ABNORMAL /HPF (ref 0–2)
RENAL EPITHELIAL, UA: ABNORMAL /HPF
SODIUM BLD-SCNC: 145 MMOL/L (ref 135–144)
SPECIFIC GRAVITY UA: 1.01 (ref 1–1.03)
TRICHOMONAS: ABNORMAL
TURBIDITY: CLEAR
URINE HGB: NEGATIVE
UROBILINOGEN, URINE: NORMAL
WBC UA: ABNORMAL /HPF
YEAST: ABNORMAL

## 2020-01-28 PROCEDURE — 74022 RADEX COMPL AQT ABD SERIES: CPT

## 2020-01-28 PROCEDURE — 81001 URINALYSIS AUTO W/SCOPE: CPT

## 2020-01-28 PROCEDURE — 80048 BASIC METABOLIC PNL TOTAL CA: CPT

## 2020-01-28 PROCEDURE — 36415 COLL VENOUS BLD VENIPUNCTURE: CPT

## 2020-01-30 ENCOUNTER — TELEPHONE (OUTPATIENT)
Dept: FAMILY MEDICINE CLINIC | Age: 50
End: 2020-01-30

## 2020-01-30 RX ORDER — METFORMIN HYDROCHLORIDE 500 MG/1
TABLET, EXTENDED RELEASE ORAL
Qty: 30 TABLET | Refills: 2 | Status: SHIPPED | OUTPATIENT
Start: 2020-01-30 | End: 2020-02-06 | Stop reason: SDUPTHER

## 2020-01-30 RX ORDER — PRAMIPEXOLE DIHYDROCHLORIDE 1 MG/1
TABLET ORAL
Qty: 90 TABLET | Refills: 0 | Status: SHIPPED | OUTPATIENT
Start: 2020-01-30 | End: 2020-02-06 | Stop reason: SDUPTHER

## 2020-01-30 NOTE — TELEPHONE ENCOUNTER
Patient called in c/o of chisagar I offered her an appointment an she refused due to transportation       She then asked for an appointment for her daughter after 200 and I informed her we do not have anything      Patient was understanding but asked that I be sure to ask Dayton Dominguez to call her [phone]

## 2020-02-04 ENCOUNTER — OFFICE VISIT (OUTPATIENT)
Dept: FAMILY MEDICINE CLINIC | Age: 50
End: 2020-02-04
Payer: COMMERCIAL

## 2020-02-04 VITALS
BODY MASS INDEX: 41.88 KG/M2 | WEIGHT: 244 LBS | DIASTOLIC BLOOD PRESSURE: 70 MMHG | HEART RATE: 100 BPM | TEMPERATURE: 97.8 F | OXYGEN SATURATION: 98 % | SYSTOLIC BLOOD PRESSURE: 100 MMHG

## 2020-02-04 LAB — HBA1C MFR BLD: 6.4 %

## 2020-02-04 PROCEDURE — 2022F DILAT RTA XM EVC RTNOPTHY: CPT | Performed by: NURSE PRACTITIONER

## 2020-02-04 PROCEDURE — G8427 DOCREV CUR MEDS BY ELIG CLIN: HCPCS | Performed by: NURSE PRACTITIONER

## 2020-02-04 PROCEDURE — 1111F DSCHRG MED/CURRENT MED MERGE: CPT | Performed by: NURSE PRACTITIONER

## 2020-02-04 PROCEDURE — G8484 FLU IMMUNIZE NO ADMIN: HCPCS | Performed by: NURSE PRACTITIONER

## 2020-02-04 PROCEDURE — 83036 HEMOGLOBIN GLYCOSYLATED A1C: CPT | Performed by: NURSE PRACTITIONER

## 2020-02-04 PROCEDURE — 99214 OFFICE O/P EST MOD 30 MIN: CPT | Performed by: NURSE PRACTITIONER

## 2020-02-04 PROCEDURE — 1036F TOBACCO NON-USER: CPT | Performed by: NURSE PRACTITIONER

## 2020-02-04 PROCEDURE — G8417 CALC BMI ABV UP PARAM F/U: HCPCS | Performed by: NURSE PRACTITIONER

## 2020-02-04 PROCEDURE — 3017F COLORECTAL CA SCREEN DOC REV: CPT | Performed by: NURSE PRACTITIONER

## 2020-02-04 PROCEDURE — 3046F HEMOGLOBIN A1C LEVEL >9.0%: CPT | Performed by: NURSE PRACTITIONER

## 2020-02-04 ASSESSMENT — ENCOUNTER SYMPTOMS
VOMITING: 0
NAUSEA: 0
SINUS PAIN: 1
COUGH: 1
SORE THROAT: 1

## 2020-02-04 ASSESSMENT — PATIENT HEALTH QUESTIONNAIRE - PHQ9
SUM OF ALL RESPONSES TO PHQ QUESTIONS 1-9: 0
1. LITTLE INTEREST OR PLEASURE IN DOING THINGS: 0
SUM OF ALL RESPONSES TO PHQ QUESTIONS 1-9: 0
2. FEELING DOWN, DEPRESSED OR HOPELESS: 0
SUM OF ALL RESPONSES TO PHQ9 QUESTIONS 1 & 2: 0

## 2020-02-04 NOTE — PROGRESS NOTES
(1 of 2) 02/15/2019       Past Surgical History:     Past Surgical History:   Procedure Laterality Date    CHOLECYSTECTOMY      HYSTERECTOMY      TONSILLECTOMY          Medications:       Prior to Admission medications    Medication Sig Start Date End Date Taking? Authorizing Provider   metFORMIN (GLUCOPHAGE-XR) 500 MG extended release tablet take 1 tablet by mouth once daily with breakfast 1/30/20  Yes TOSHA Franks CNP   pramipexole (MIRAPEX) 1 MG tablet take 1 tablet by mouth three times a day 1/30/20  Yes TOSHA Franks CNP   Multiple Vitamin (RA ONE DAILY ESSENTIAL) TABS take 1 tablet by mouth daily 1/10/20  Yes TOSHA Franks CNP   omeprazole (PRILOSEC) 20 MG delayed release capsule Take 1 capsule by mouth daily 10/10/19  Yes TOSHA Farnks CNP   loratadine (CLARITIN) 10 MG tablet Take 1 tablet by mouth daily 10/10/19  Yes TOSHA Franks CNP   sertraline (ZOLOFT) 100 MG tablet Take 1 tablet by mouth daily 10/10/19  Yes TOSHA Franks CNP   simvastatin (ZOCOR) 40 MG tablet Take 1 tablet by mouth nightly 10/10/19  Yes TOSHA Franks CNP   Cholecalciferol (VITAMIN D) 2000 units TABS tablet Take 1 tablet by mouth daily 10/10/19  Yes TOSHA Franks CNP   lisinopril (PRINIVIL;ZESTRIL) 40 MG tablet take 1 tablet by mouth daily 10/7/19  Yes TOSHA Franks CNP   cyclobenzaprine (FLEXERIL) 10 MG tablet take 1 tablet by mouth nightly if needed for muscle spasm 10/7/19  Yes TOSHA Franks CNP   blood glucose monitor strips Check daily 5/20/19  Yes TOSHA Franks CNP   blood glucose monitor kit and supplies Check daily 5/8/19  Yes TOSHA Franks CNP   Lancets MISC Check blood sugar daily 5/8/19  Yes TOSHA Franks CNP   pregabalin (LYRICA) 75 MG capsule Take 1 capsule by mouth 2 times daily for 60 days.  11/26/19 1/25/20  TOSHA Franks CNP Allergies:       Seasonal    Social History:     Tobacco:    reports that she has never smoked. She has never used smokeless tobacco.  Alcohol:      reports no history of alcohol use. Drug Use:  reports no history of drug use. Family History:      No family history on file. Review of Systems:         Review of Systems   Constitutional: Negative for fatigue. HENT: Positive for congestion, sinus pain and sore throat. Respiratory: Positive for cough. Gastrointestinal: Negative for nausea and vomiting. Neurological: Negative for weakness. Physical Exam:     Vitals:  /70 (Site: Left Upper Arm, Cuff Size: Medium Adult)   Pulse 100   Temp 97.8 °F (36.6 °C) (Oral)   Wt 244 lb (110.7 kg)   SpO2 98%   BMI 41.88 kg/m²       Physical Exam  Vitals signs and nursing note reviewed. Constitutional:       Appearance: She is well-developed. HENT:      Nose: Rhinorrhea present. Mouth/Throat:      Pharynx: Posterior oropharyngeal erythema present. No oropharyngeal exudate. Cardiovascular:      Rate and Rhythm: Normal rate and regular rhythm. Pulmonary:      Effort: Pulmonary effort is normal. No respiratory distress. Breath sounds: Normal breath sounds. Skin:     General: Skin is warm and dry. Neurological:      Mental Status: She is alert and oriented to person, place, and time.                Data:     Lab Results   Component Value Date     01/28/2020    K 4.1 01/28/2020     01/28/2020    CO2 27 01/28/2020    BUN 11 01/28/2020    CREATININE 0.68 01/28/2020    GLUCOSE 101 01/28/2020    PROT 6.6 01/20/2020    LABALBU 3.5 01/20/2020    BILITOT 0.43 01/20/2020    ALKPHOS 92 01/20/2020    AST 25 01/20/2020    ALT 38 01/20/2020     Lab Results   Component Value Date    WBC 5.6 01/21/2020    RBC 4.38 01/21/2020    HGB 11.5 01/21/2020    HCT 38.7 01/21/2020    MCV 88.4 01/21/2020    MCH 26.3 01/21/2020    MCHC 29.7 01/21/2020    RDW 14.4 01/21/2020     01/21/2020 MPV 9.6 01/21/2020     Lab Results   Component Value Date    TSH 1.00 01/19/2020     Lab Results   Component Value Date    CHOL 168 05/02/2019    HDL 44 05/02/2019    LABA1C 6.1 10/10/2019          Assessment & Plan        Diagnosis Orders   1. Type 2 diabetes mellitus with complication, without long-term current use of insulin (HCC)  --A1c=6.4%. continue metformin. Control diet better. 2. Breast cancer screening by mammogram  JAYY DIGITAL SCREEN W CAD BILATERAL   3. URI            ---Patient has been having symptoms of a viral URI. No need for antibiotics. Coricidin HBP Cold and Cough 1 tab every 6 hours as needed (nasal decongestant and antihistamine)  Flonase 1 spray each nostril twice daily (nasal steroid)  Ibuprofen 3 times a day (antiinflammatory)   Warm tea with 1tbsp honey (soothes the throat)  Increase water intake  Rest    Patient verbalizes understanding and agreement with plan. All questions answered. If symptoms do not resolve or worsen, return to office. Completed Refills   Requested Prescriptions      No prescriptions requested or ordered in this encounter     No follow-ups on file. No orders of the defined types were placed in this encounter. Orders Placed This Encounter   Procedures    JAYY DIGITAL SCREEN W CAD BILATERAL     Standing Status:   Future     Standing Expiration Date:   4/5/2021         Patient Instructions   SURVEY:    You may be receiving a survey from [employer] regarding your visit today. Please complete the survey to enable us to provide the highest quality of care to you and your family. If you cannot score us a very good (5 Stars) on any question, please call the office to discuss how we could have made your experience a very good one. Thank you.     Clinical Care Team: TOSHA Young-ALEXEY Trammell LPN    Clerical Team: Yolie Schofield

## 2020-02-04 NOTE — PATIENT INSTRUCTIONS
SURVEY:    You may be receiving a survey from [employer] regarding your visit today. Please complete the survey to enable us to provide the highest quality of care to you and your family. If you cannot score us a very good (5 Stars) on any question, please call the office to discuss how we could have made your experience a very good one. Thank you.     Clinical Care Team: TOSHA Mccullough-ALEXEY Whittaker LPN    Clerical Team: [address]                           [place]

## 2020-02-06 RX ORDER — LORATADINE 10 MG/1
10 TABLET ORAL DAILY
Qty: 90 TABLET | Refills: 1 | Status: SHIPPED | OUTPATIENT
Start: 2020-02-06 | End: 2020-10-12

## 2020-02-06 RX ORDER — BLOOD PRESSURE TEST KIT
KIT MISCELLANEOUS
Qty: 100 EACH | Refills: 3 | Status: SHIPPED | OUTPATIENT
Start: 2020-02-06

## 2020-02-06 RX ORDER — METFORMIN HYDROCHLORIDE 500 MG/1
TABLET, EXTENDED RELEASE ORAL
Qty: 90 TABLET | Refills: 1 | Status: SHIPPED | OUTPATIENT
Start: 2020-02-06 | End: 2020-10-12

## 2020-02-06 RX ORDER — OMEPRAZOLE 20 MG/1
20 CAPSULE, DELAYED RELEASE ORAL DAILY
Qty: 90 CAPSULE | Refills: 1 | Status: SHIPPED | OUTPATIENT
Start: 2020-02-06 | End: 2020-10-12

## 2020-02-06 RX ORDER — GLUCOSAMINE HCL/CHONDROITIN SU 500-400 MG
CAPSULE ORAL
Qty: 100 STRIP | Refills: 3 | Status: SHIPPED | OUTPATIENT
Start: 2020-02-06

## 2020-02-06 RX ORDER — CHOLECALCIFEROL (VITAMIN D3) 50 MCG
2000 TABLET ORAL DAILY
Qty: 90 TABLET | Refills: 1 | Status: SHIPPED | OUTPATIENT
Start: 2020-02-06 | End: 2020-10-12

## 2020-02-06 RX ORDER — LISINOPRIL 40 MG/1
40 TABLET ORAL DAILY
Qty: 90 TABLET | Refills: 1 | Status: SHIPPED | OUTPATIENT
Start: 2020-02-06 | End: 2020-10-12

## 2020-02-06 RX ORDER — LANCETS 30 GAUGE
EACH MISCELLANEOUS
Qty: 100 EACH | Refills: 3 | Status: SHIPPED | OUTPATIENT
Start: 2020-02-06 | End: 2020-10-28

## 2020-02-06 RX ORDER — SIMVASTATIN 40 MG
40 TABLET ORAL NIGHTLY
Qty: 90 TABLET | Refills: 1 | Status: SHIPPED
Start: 2020-02-06 | End: 2020-04-14 | Stop reason: SINTOL

## 2020-02-06 RX ORDER — DOCUSATE SODIUM 50 MG
CAPSULE ORAL
Qty: 90 TABLET | Refills: 1 | Status: SHIPPED | OUTPATIENT
Start: 2020-02-06 | End: 2020-07-28

## 2020-02-06 RX ORDER — SERTRALINE HYDROCHLORIDE 100 MG/1
100 TABLET, FILM COATED ORAL DAILY
Qty: 90 TABLET | Refills: 1 | Status: SHIPPED | OUTPATIENT
Start: 2020-02-06 | End: 2020-10-12

## 2020-02-06 RX ORDER — PRAMIPEXOLE DIHYDROCHLORIDE 1 MG/1
TABLET ORAL
Qty: 270 TABLET | Refills: 1 | Status: SHIPPED | OUTPATIENT
Start: 2020-02-06 | End: 2020-07-28

## 2020-02-06 RX ORDER — CYCLOBENZAPRINE HCL 10 MG
TABLET ORAL
Qty: 90 TABLET | Refills: 1 | Status: SHIPPED
Start: 2020-02-06 | End: 2020-03-20

## 2020-02-06 NOTE — TELEPHONE ENCOUNTER
Phone call from patient's new pharmacy  They will be sending rx requests on this patient  Elli Bahena added.      Rx's pending to [facility] pharmacy

## 2020-02-10 ENCOUNTER — OFFICE VISIT (OUTPATIENT)
Dept: FAMILY MEDICINE CLINIC | Age: 50
End: 2020-02-10
Payer: COMMERCIAL

## 2020-02-10 VITALS
BODY MASS INDEX: 41.88 KG/M2 | SYSTOLIC BLOOD PRESSURE: 124 MMHG | WEIGHT: 244 LBS | HEART RATE: 104 BPM | DIASTOLIC BLOOD PRESSURE: 82 MMHG | OXYGEN SATURATION: 98 %

## 2020-02-10 PROCEDURE — 99213 OFFICE O/P EST LOW 20 MIN: CPT | Performed by: INTERNAL MEDICINE

## 2020-02-10 PROCEDURE — G8484 FLU IMMUNIZE NO ADMIN: HCPCS | Performed by: INTERNAL MEDICINE

## 2020-02-10 PROCEDURE — 1111F DSCHRG MED/CURRENT MED MERGE: CPT | Performed by: INTERNAL MEDICINE

## 2020-02-10 PROCEDURE — 3017F COLORECTAL CA SCREEN DOC REV: CPT | Performed by: INTERNAL MEDICINE

## 2020-02-10 PROCEDURE — G8427 DOCREV CUR MEDS BY ELIG CLIN: HCPCS | Performed by: INTERNAL MEDICINE

## 2020-02-10 PROCEDURE — G8417 CALC BMI ABV UP PARAM F/U: HCPCS | Performed by: INTERNAL MEDICINE

## 2020-02-10 PROCEDURE — 1036F TOBACCO NON-USER: CPT | Performed by: INTERNAL MEDICINE

## 2020-02-10 RX ORDER — BENZONATATE 100 MG/1
100 CAPSULE ORAL 2 TIMES DAILY PRN
Qty: 20 CAPSULE | Refills: 0 | Status: SHIPPED | OUTPATIENT
Start: 2020-02-10 | End: 2020-02-17

## 2020-02-10 RX ORDER — AMOXICILLIN AND CLAVULANATE POTASSIUM 875; 125 MG/1; MG/1
1 TABLET, FILM COATED ORAL 2 TIMES DAILY
Qty: 14 TABLET | Refills: 0 | Status: SHIPPED | OUTPATIENT
Start: 2020-02-10 | End: 2020-02-17

## 2020-02-10 ASSESSMENT — ENCOUNTER SYMPTOMS
SORE THROAT: 0
COUGH: 1
SINUS PRESSURE: 0
ABDOMINAL PAIN: 0
RHINORRHEA: 0
TROUBLE SWALLOWING: 0
WHEEZING: 0
NAUSEA: 0
CHEST TIGHTNESS: 0
VOMITING: 0
DIARRHEA: 0
SHORTNESS OF BREATH: 0

## 2020-02-10 NOTE — PATIENT INSTRUCTIONS
Survey: You may be receiving a survey from [employer] regarding your visit today. You may get this in the mail, through your MyChart or in your email. Please complete the survey to enable us to provide the highest quality of care to you and your family. If you cannot score us as very good ( 5 Stars) on any question, please feel free to call the office to discuss how we could have made your experience exceptional.     Thank You!       MD Nella Ceja [address], PCA

## 2020-02-10 NOTE — PROGRESS NOTES
HPI Notes    Name: Omkar Gilchrist  : 1970         Chief Complaint:     Chief Complaint   Patient presents with    Cough     has had a cough for a few days now and it has not gotten better, saw Mel Fritz on 20 and was diagnosed with a viral URI, but it is not getting better.  Other     states that she is having ear trouble, sounds like she is talking in a tunnel. History of Present Illness:        Magdalena Matt comes to office for evaluation of cough. States had this cough for more than one week    Was seen on 20 by Mel Fritz, CNP, was recommended supportive care for URI. She states she drank hot tea with honey but it did not help  States cough is not going away. States initially had fever and chills but not having them anymore. Reports no CP, SOB, no heartburn or belching    Cough   This is a new problem. The current episode started 1 to 4 weeks ago. The problem has been unchanged. The problem occurs hourly. The cough is non-productive. Associated symptoms include ear congestion. Pertinent negatives include no chest pain, chills, ear pain, fever, headaches, myalgias, nasal congestion, postnasal drip, rash, rhinorrhea, sore throat, shortness of breath or wheezing. The symptoms are aggravated by lying down. She has tried nothing for the symptoms. The treatment provided no relief. There is no history of asthma or COPD.            Past Medical History:     Past Medical History:   Diagnosis Date    Anemia     Depression     Diabetes mellitus ([facility]     Hyperlipidemia     Hypertension       Reviewed all health maintenance requirements and orderedappropriate tests  Health Maintenance Due   Topic Date Due    Pneumococcal 0-64 years Vaccine (1 of 1 - PPSV23) 02/15/1975    DTaP/Tdap/Td vaccine (1 - Tdap) 02/15/1980    HIV screen  02/15/1984    Hepatitis B vaccine (1 of 3 - Risk 3-dose series) 02/15/1988    Cervical cancer screen  02/15/1990    Breast cancer screen  02/15/2019    normal.      Left Ear: Tympanic membrane, ear canal and external ear normal.      Nose: Nose normal. No congestion. Mouth/Throat:      Mouth: Mucous membranes are moist.      Pharynx: No posterior oropharyngeal erythema. Eyes:      General:         Right eye: No discharge. Left eye: Discharge present. Conjunctiva/sclera: Conjunctivae normal.   Neck:      Thyroid: No thyromegaly. Cardiovascular:      Rate and Rhythm: Normal rate and regular rhythm. Heart sounds: Normal heart sounds. No murmur. Pulmonary:      Effort: Pulmonary effort is normal.      Breath sounds: Normal breath sounds. No wheezing, rhonchi or rales. Abdominal:      General: Bowel sounds are normal. There is no distension. Palpations: Abdomen is soft. There is no mass. Tenderness: There is no abdominal tenderness. Musculoskeletal: Normal range of motion. Right lower leg: No edema. Left lower leg: No edema. Lymphadenopathy:      Cervical: No cervical adenopathy. Skin:     General: Skin is warm and dry. Findings: No rash. Neurological:      Mental Status: She is alert and oriented to person, place, and time.    Psychiatric:         Behavior: Behavior normal.         Judgment: Judgment normal.               Data:     Lab Results   Component Value Date     01/28/2020    K 4.1 01/28/2020     01/28/2020    CO2 27 01/28/2020    BUN 11 01/28/2020    CREATININE 0.68 01/28/2020    GLUCOSE 101 01/28/2020    PROT 6.6 01/20/2020    LABALBU 3.5 01/20/2020    BILITOT 0.43 01/20/2020    ALKPHOS 92 01/20/2020    AST 25 01/20/2020    ALT 38 01/20/2020     Lab Results   Component Value Date    WBC 5.6 01/21/2020    RBC 4.38 01/21/2020    HGB 11.5 01/21/2020    HCT 38.7 01/21/2020    MCV 88.4 01/21/2020    MCH 26.3 01/21/2020    MCHC 29.7 01/21/2020    RDW 14.4 01/21/2020     01/21/2020    MPV 9.6 01/21/2020     Lab Results   Component Value Date    TSH 1.00 01/19/2020     Lab Results

## 2020-02-14 ENCOUNTER — TELEPHONE (OUTPATIENT)
Dept: FAMILY MEDICINE CLINIC | Age: 50
End: 2020-02-14

## 2020-02-14 NOTE — TELEPHONE ENCOUNTER
Pt has burning when she urinates. Do you need to see her? [facility]    Health Maintenance   Topic Date Due    Pneumococcal 0-64 years Vaccine (1 of 1 - PPSV23) 02/15/1975    DTaP/Tdap/Td vaccine (1 - Tdap) 02/15/1980    HIV screen  02/15/1984    Hepatitis B vaccine (1 of 3 - Risk 3-dose series) 02/15/1988    Cervical cancer screen  02/15/1990    Breast cancer screen  02/15/2019    Shingles Vaccine (1 of 2) 02/15/2019    Flu vaccine (1) 10/10/2020 (Originally 9/1/2019)    Lipid screen  05/02/2020    Diabetic foot exam  07/22/2020    Diabetic microalbuminuria test  07/22/2020    Diabetic retinal exam  07/22/2020    Colon Cancer Screen FIT/FOBT  07/25/2020    Potassium monitoring  01/28/2021    Creatinine monitoring  01/28/2021    A1C test (Diabetic or Prediabetic)  02/04/2021    Hepatitis A vaccine  Aged Out    Hib vaccine  Aged Out    Meningococcal (ACWY) vaccine  Aged Out             (applicable per patient's age: Cancer Screenings, Depression Screening, Fall Risk Screening, Immunizations)    Hemoglobin A1C (%)   Date Value   02/04/2020 6.4   10/10/2019 6.1   07/22/2019 6.2     LDL Cholesterol (mg/dL)   Date Value   05/02/2019 97     AST (U/L)   Date Value   01/20/2020 25     ALT (U/L)   Date Value   01/20/2020 38 (H)     BUN (mg/dL)   Date Value   01/28/2020 11      (goal A1C is < 7)   (goal LDL is <100) need 30-50% reduction from baseline     BP Readings from Last 3 Encounters:   02/10/20 124/82   02/04/20 100/70   01/23/20 128/80    (goal /80)      All Future Testing planned in [facility]:  Lab Frequency Next Occurrence   XR SHOULDER LEFT (MIN 2 VIEWS) Once 05/31/2019   JAYY DIGITAL SCREEN W CAD BILATERAL Once 02/04/2020       Next Visit Date:  No future appointments.          Patient Active Problem List:     SBO (small bowel obstruction) (HCC)     NOLAN (acute kidney injury) ([facility] [address]     Type 2 diabetes mellitus without complication, without long-term current use of insulin ([address]     Morbid obesity due to excess calories ([facility] [address]

## 2020-02-17 NOTE — TELEPHONE ENCOUNTER
Pt is having black stool but not firm. She is eating and gets stomach ache and has to go right away.

## 2020-02-18 ENCOUNTER — TELEPHONE (OUTPATIENT)
Dept: FAMILY MEDICINE CLINIC | Age: 50
End: 2020-02-18

## 2020-02-22 ENCOUNTER — HOSPITAL ENCOUNTER (OUTPATIENT)
Age: 50
Discharge: HOME | End: 2020-02-22
Payer: COMMERCIAL

## 2020-02-22 LAB
-: ABNORMAL
ABSOLUTE EOS #: 0.1 K/UL (ref 0–0.4)
ABSOLUTE IMMATURE GRANULOCYTE: ABNORMAL K/UL (ref 0–0.3)
ABSOLUTE LYMPH #: 2.8 K/UL (ref 1–4.8)
ABSOLUTE MONO #: 0.3 K/UL (ref 0–1)
ALBUMIN SERPL-MCNC: 4.3 G/DL (ref 3.5–5.2)
ALBUMIN/GLOBULIN RATIO: ABNORMAL (ref 1–2.5)
ALP BLD-CCNC: 114 U/L (ref 35–104)
ALT SERPL-CCNC: 37 U/L (ref 5–33)
AMORPHOUS: ABNORMAL
ANION GAP SERPL CALCULATED.3IONS-SCNC: 14 MMOL/L (ref 9–17)
AST SERPL-CCNC: 31 U/L
BACTERIA: ABNORMAL
BASOPHILS # BLD: 0 % (ref 0–2)
BASOPHILS ABSOLUTE: 0 K/UL (ref 0–0.2)
BILIRUB SERPL-MCNC: 0.15 MG/DL (ref 0.3–1.2)
BILIRUBIN URINE: NEGATIVE
BUN BLDV-MCNC: 14 MG/DL (ref 6–20)
BUN/CREAT BLD: 20 (ref 9–20)
CALCIUM SERPL-MCNC: 10.9 MG/DL (ref 8.6–10.4)
CASTS UA: ABNORMAL /LPF
CHLORIDE BLD-SCNC: 106 MMOL/L (ref 98–107)
CO2: 25 MMOL/L (ref 20–31)
COLOR: YELLOW
COMMENT UA: ABNORMAL
CREAT SERPL-MCNC: 0.69 MG/DL (ref 0.5–0.9)
CRYSTALS, UA: ABNORMAL /HPF
DIFFERENTIAL TYPE: YES
EOSINOPHILS RELATIVE PERCENT: 2 % (ref 0–5)
EPITHELIAL CELLS UA: ABNORMAL /HPF
GFR AFRICAN AMERICAN: >60 ML/MIN
GFR NON-AFRICAN AMERICAN: >60 ML/MIN
GFR SERPL CREATININE-BSD FRML MDRD: ABNORMAL ML/MIN/{1.73_M2}
GFR SERPL CREATININE-BSD FRML MDRD: ABNORMAL ML/MIN/{1.73_M2}
GLUCOSE BLD-MCNC: 125 MG/DL (ref 70–99)
GLUCOSE URINE: NEGATIVE
HCT VFR BLD CALC: 41.6 % (ref 36–46)
HEMOGLOBIN: 12.9 G/DL (ref 12–16)
IMMATURE GRANULOCYTES: ABNORMAL %
KETONES, URINE: NEGATIVE
LEUKOCYTE ESTERASE, URINE: NEGATIVE
LYMPHOCYTES # BLD: 39 % (ref 15–40)
MCH RBC QN AUTO: 25 PG (ref 26–34)
MCHC RBC AUTO-ENTMCNC: 31 G/DL (ref 31–37)
MCV RBC AUTO: 80.5 FL (ref 80–100)
MONOCYTES # BLD: 4 % (ref 4–8)
MUCUS: ABNORMAL
NITRITE, URINE: NEGATIVE
NRBC AUTOMATED: ABNORMAL PER 100 WBC
OTHER OBSERVATIONS UA: ABNORMAL
PDW BLD-RTO: 15.3 % (ref 12.1–15.2)
PH UA: 6 (ref 5–8)
PLATELET # BLD: 256 K/UL (ref 140–450)
PLATELET ESTIMATE: ABNORMAL
PMV BLD AUTO: ABNORMAL FL (ref 6–12)
POTASSIUM SERPL-SCNC: 4.5 MMOL/L (ref 3.7–5.3)
PROTEIN UA: ABNORMAL
RBC # BLD: 5.17 M/UL (ref 4–5.2)
RBC # BLD: ABNORMAL 10*6/UL
RBC UA: ABNORMAL /HPF (ref 0–2)
RENAL EPITHELIAL, UA: ABNORMAL /HPF
SEG NEUTROPHILS: 55 % (ref 47–75)
SEGMENTED NEUTROPHILS ABSOLUTE COUNT: 4.1 K/UL (ref 2.5–7)
SODIUM BLD-SCNC: 145 MMOL/L (ref 135–144)
SPECIFIC GRAVITY UA: 1.02 (ref 1–1.03)
TOTAL PROTEIN: 7.3 G/DL (ref 6.4–8.3)
TRICHOMONAS: ABNORMAL
TURBIDITY: ABNORMAL
URINE HGB: ABNORMAL
UROBILINOGEN, URINE: NORMAL
WBC # BLD: 7.3 K/UL (ref 3.5–11)
WBC # BLD: ABNORMAL 10*3/UL
WBC UA: ABNORMAL /HPF
YEAST: ABNORMAL

## 2020-02-22 PROCEDURE — 36415 COLL VENOUS BLD VENIPUNCTURE: CPT

## 2020-02-22 PROCEDURE — 85025 COMPLETE CBC W/AUTO DIFF WBC: CPT

## 2020-02-22 PROCEDURE — 80053 COMPREHEN METABOLIC PANEL: CPT

## 2020-02-22 PROCEDURE — 81001 URINALYSIS AUTO W/SCOPE: CPT

## 2020-02-24 ENCOUNTER — TELEPHONE (OUTPATIENT)
Dept: FAMILY MEDICINE CLINIC | Age: 50
End: 2020-02-24

## 2020-02-24 NOTE — TELEPHONE ENCOUNTER
(acute kidney injury) ([address]     Type 2 diabetes mellitus without complication, without long-term current use of insulin ([address]     Morbid obesity due to excess calories ([address]

## 2020-02-25 ENCOUNTER — HOSPITAL ENCOUNTER (OUTPATIENT)
Age: 50
Setting detail: SPECIMEN
Discharge: HOME | End: 2020-02-25
Payer: COMMERCIAL

## 2020-02-25 LAB
DATE, STOOL #1: NORMAL
DATE, STOOL #2: NORMAL
DATE, STOOL #3: NORMAL
HEMOCCULT SP1 STL QL: NEGATIVE
HEMOCCULT SP2 STL QL: NORMAL
HEMOCCULT SP3 STL QL: NORMAL
TIME, STOOL #1: 1200
TIME, STOOL #2: NORMAL
TIME, STOOL #3: NORMAL

## 2020-02-25 PROCEDURE — G0328 FECAL BLOOD SCRN IMMUNOASSAY: HCPCS

## 2020-03-12 ENCOUNTER — TELEPHONE (OUTPATIENT)
Dept: FAMILY MEDICINE CLINIC | Age: 50
End: 2020-03-12

## 2020-03-13 ENCOUNTER — HOSPITAL ENCOUNTER (EMERGENCY)
Age: 50
Discharge: HOME | End: 2020-03-13
Attending: EMERGENCY MEDICINE
Payer: COMMERCIAL

## 2020-03-13 ENCOUNTER — APPOINTMENT (OUTPATIENT)
Dept: GENERAL RADIOLOGY | Age: 50
End: 2020-03-13
Payer: COMMERCIAL

## 2020-03-13 VITALS
WEIGHT: 244 LBS | HEIGHT: 64 IN | TEMPERATURE: 98.6 F | BODY MASS INDEX: 41.66 KG/M2 | HEART RATE: 123 BPM | RESPIRATION RATE: 20 BRPM | OXYGEN SATURATION: 95 %

## 2020-03-13 PROCEDURE — 6370000000 HC RX 637 (ALT 250 FOR IP): Performed by: EMERGENCY MEDICINE

## 2020-03-13 PROCEDURE — 99283 EMERGENCY DEPT VISIT LOW MDM: CPT

## 2020-03-13 PROCEDURE — 73590 X-RAY EXAM OF LOWER LEG: CPT

## 2020-03-13 RX ORDER — HYDROCODONE BITARTRATE AND ACETAMINOPHEN 5; 325 MG/1; MG/1
1 TABLET ORAL ONCE
Status: COMPLETED | OUTPATIENT
Start: 2020-03-13 | End: 2020-03-13

## 2020-03-13 RX ORDER — PREDNISONE 20 MG/1
TABLET ORAL
Qty: 10 TABLET | Refills: 0 | Status: SHIPPED | OUTPATIENT
Start: 2020-03-13 | End: 2020-04-14 | Stop reason: ALTCHOICE

## 2020-03-13 RX ADMIN — HYDROCODONE BITARTRATE AND ACETAMINOPHEN 1 TABLET: 5; 325 TABLET ORAL at 16:04

## 2020-03-13 ASSESSMENT — PAIN DESCRIPTION - PAIN TYPE: TYPE: ACUTE PAIN

## 2020-03-13 ASSESSMENT — PAIN SCALES - GENERAL
PAINLEVEL_OUTOF10: 7
PAINLEVEL_OUTOF10: 0
PAINLEVEL_OUTOF10: 7

## 2020-03-13 ASSESSMENT — PAIN DESCRIPTION - FREQUENCY: FREQUENCY: CONTINUOUS

## 2020-03-13 ASSESSMENT — PAIN DESCRIPTION - ONSET: ONSET: ON-GOING

## 2020-03-13 ASSESSMENT — PAIN DESCRIPTION - LOCATION: LOCATION: LEG

## 2020-03-13 ASSESSMENT — PAIN DESCRIPTION - ORIENTATION: ORIENTATION: RIGHT

## 2020-03-13 ASSESSMENT — PAIN DESCRIPTION - PROGRESSION: CLINICAL_PROGRESSION: GRADUALLY WORSENING

## 2020-03-13 ASSESSMENT — PAIN DESCRIPTION - DESCRIPTORS: DESCRIPTORS: CONSTANT

## 2020-03-13 NOTE — ED PROVIDER NOTES
[address]    Chief Complaint   Patient presents with    Leg Pain     right leg pain       HPI    Lorraine Lim is a 45 y.o. female who presentsto ED from home. By EMS. With complaint of R leg pain. Onset leg pain for several days. Worse in am.  She states that she wakes up with pain in the right shin. The pain starts from the right ankle and radiates up to the right knee laterally. No known injury. Intensity of symptoms moderate. Location of symptoms R lateral leg. Denies injury. Patient is able to ambulate without assistance.     PAST MEDICAL HISTORY    Past Medical History:   Diagnosis Date    Anemia     Depression     Diabetes mellitus ([facility] [address]     Hyperlipidemia     Hypertension        SURGICAL HISTORY    Past Surgical History:   Procedure Laterality Date    CHOLECYSTECTOMY      HYSTERECTOMY      TONSILLECTOMY         CURRENT MEDICATIONS    Current Outpatient Rx   Medication Sig Dispense Refill    predniSONE (DELTASONE) 20 MG tablet 2 tablets daily x3 days then 1 tablet daily 10 tablet 0    Cholecalciferol (VITAMIN D) 50 MCG (2000 UT) TABS tablet Take 1 tablet by mouth daily 90 tablet 1    lisinopril (PRINIVIL;ZESTRIL) 40 MG tablet Take 1 tablet by mouth daily 90 tablet 1    cyclobenzaprine (FLEXERIL) 10 MG tablet take 1 tablet by mouth nightly if needed for muscle spasm 90 tablet 1    simvastatin (ZOCOR) 40 MG tablet Take 1 tablet by mouth nightly 90 tablet 1    sertraline (ZOLOFT) 100 MG tablet Take 1 tablet by mouth daily 90 tablet 1    loratadine (CLARITIN) 10 MG tablet Take 1 tablet by mouth daily 90 tablet 1    omeprazole (PRILOSEC) 20 MG delayed release capsule Take 1 capsule by mouth daily 90 capsule 1    Multiple Vitamin (RA ONE DAILY ESSENTIAL) TABS take 1 tablet by mouth daily 90 tablet 1    pramipexole (MIRAPEX) 1 MG tablet take 1 tablet by mouth three times a day 270 tablet 1    metFORMIN (GLUCOPHAGE-XR) 500 MG extended release tablet take 1 tablet by mouth once daily with breakfast 90 tablet 1    blood glucose monitor strips Check daily. Please dispense true metrix test strips 100 strip 3    Lancets MISC Check daily. Please dispense true metrix lancets 100 each 3    Alcohol Swabs PADS Check blood glucose daily 100 each 3    pregabalin (LYRICA) 75 MG capsule Take 1 capsule by mouth 2 times daily for 60 days. 60 capsule 1    blood glucose monitor kit and supplies Check daily 1 kit 0       ALLERGIES    Allergies   Allergen Reactions    Seasonal        FAMILY HISTORY    History reviewed. No pertinent family history.     SOCIAL HISTORY    Social History     Socioeconomic History    Marital status:      Spouse name: None    Number of children: None    Years of education: None    Highest education level: None   Occupational History    None   Social Needs    Financial resource strain: None    Food insecurity     Worry: None     Inability: None    Transportation needs     Medical: None     Non-medical: None   Tobacco Use    Smoking status: Never Smoker    Smokeless tobacco: Never Used   Substance and Sexual Activity    Alcohol use: No    Drug use: No    Sexual activity: Not Currently   Lifestyle    Physical activity     Days per week: None     Minutes per session: None    Stress: None   Relationships    Social connections     Talks on phone: None     Gets together: None     Attends Hinduism service: None     Active member of club or organization: None     Attends meetings of clubs or organizations: None     Relationship status: None    Intimate partner violence     Fear of current or ex partner: None     Emotionally abused: None     Physically abused: None     Forced sexual activity: None   Other Topics Concern    None   Social History Narrative    ** Merged History Encounter **                Review of Systems:  Constitutional:  Denies fever, chills, weight loss or weakness   Eyes:  Denies photophobia or Labs  Labs Reviewed - No data to display          Summation      Patient Course: X-rays negative. Ace wrap is applied in ED. Patient be sent home on prednisone. Warning signs were discussed. Return to ED if worse. Weight   Bearing as tolerated. ED Medications administered this visit:    Medications   HYDROcodone-acetaminophen (NORCO) 5-325 MG per tablet 1 tablet (1 tablet Oral Given 3/13/20 1604)       New Prescriptions from this visit:    New Prescriptions    PREDNISONE (DELTASONE) 20 MG TABLET    2 tablets daily x3 days then 1 tablet daily       Follow-up:  No follow-up provider specified. Final Impression:   1.  Right leg pain               (Please note that portions of this note were completed with a voice recognition program.  Efforts were made to edit the dictations but occasionally words are mis-transcribed.)       Amanda Han MD  03/13/20 26 [id number]

## 2020-03-20 ENCOUNTER — OFFICE VISIT (OUTPATIENT)
Dept: FAMILY MEDICINE CLINIC | Age: 50
End: 2020-03-20
Payer: COMMERCIAL

## 2020-03-20 VITALS
DIASTOLIC BLOOD PRESSURE: 89 MMHG | HEART RATE: 100 BPM | SYSTOLIC BLOOD PRESSURE: 139 MMHG | BODY MASS INDEX: 42.51 KG/M2 | WEIGHT: 249 LBS | HEIGHT: 64 IN | OXYGEN SATURATION: 98 %

## 2020-03-20 PROCEDURE — G8427 DOCREV CUR MEDS BY ELIG CLIN: HCPCS | Performed by: INTERNAL MEDICINE

## 2020-03-20 PROCEDURE — 1036F TOBACCO NON-USER: CPT | Performed by: INTERNAL MEDICINE

## 2020-03-20 PROCEDURE — G8417 CALC BMI ABV UP PARAM F/U: HCPCS | Performed by: INTERNAL MEDICINE

## 2020-03-20 PROCEDURE — G8484 FLU IMMUNIZE NO ADMIN: HCPCS | Performed by: INTERNAL MEDICINE

## 2020-03-20 PROCEDURE — 3017F COLORECTAL CA SCREEN DOC REV: CPT | Performed by: INTERNAL MEDICINE

## 2020-03-20 PROCEDURE — 99213 OFFICE O/P EST LOW 20 MIN: CPT | Performed by: INTERNAL MEDICINE

## 2020-03-20 RX ORDER — LIDOCAINE 40 MG/G
CREAM TOPICAL
Qty: 30 G | Refills: 1 | Status: SHIPPED | OUTPATIENT
Start: 2020-03-20 | End: 2020-12-14

## 2020-03-20 RX ORDER — TIZANIDINE 2 MG/1
2 TABLET ORAL 3 TIMES DAILY PRN
Qty: 90 TABLET | Refills: 1 | Status: SHIPPED
Start: 2020-03-20 | End: 2020-04-14

## 2020-03-20 SDOH — ECONOMIC STABILITY: FOOD INSECURITY: WITHIN THE PAST 12 MONTHS, YOU WORRIED THAT YOUR FOOD WOULD RUN OUT BEFORE YOU GOT MONEY TO BUY MORE.: NEVER TRUE

## 2020-03-20 SDOH — ECONOMIC STABILITY: FOOD INSECURITY: WITHIN THE PAST 12 MONTHS, THE FOOD YOU BOUGHT JUST DIDN'T LAST AND YOU DIDN'T HAVE MONEY TO GET MORE.: NEVER TRUE

## 2020-03-20 SDOH — ECONOMIC STABILITY: INCOME INSECURITY: HOW HARD IS IT FOR YOU TO PAY FOR THE VERY BASICS LIKE FOOD, HOUSING, MEDICAL CARE, AND HEATING?: NOT VERY HARD

## 2020-03-20 SDOH — ECONOMIC STABILITY: TRANSPORTATION INSECURITY
IN THE PAST 12 MONTHS, HAS LACK OF TRANSPORTATION KEPT YOU FROM MEETINGS, WORK, OR FROM GETTING THINGS NEEDED FOR DAILY LIVING?: NO

## 2020-03-20 SDOH — ECONOMIC STABILITY: TRANSPORTATION INSECURITY
IN THE PAST 12 MONTHS, HAS THE LACK OF TRANSPORTATION KEPT YOU FROM MEDICAL APPOINTMENTS OR FROM GETTING MEDICATIONS?: NO

## 2020-03-20 ASSESSMENT — ENCOUNTER SYMPTOMS
ABDOMINAL PAIN: 0
SORE THROAT: 0
NAUSEA: 0
BACK PAIN: 1
COUGH: 0
SHORTNESS OF BREATH: 1

## 2020-03-20 NOTE — PATIENT INSTRUCTIONS
SURVEY:    You may be receiving a survey from [employer] regarding your visit today. Please complete the survey to enable us to provide the highest quality of care to you and your family. If you cannot score us a very good on any question, please call the office to discuss how we could of made your experience a very good one. Thank you.

## 2020-03-25 ENCOUNTER — HOSPITAL ENCOUNTER (OUTPATIENT)
Dept: GENERAL RADIOLOGY | Age: 50
Discharge: HOME | End: 2020-03-27
Payer: COMMERCIAL

## 2020-03-25 ENCOUNTER — HOSPITAL ENCOUNTER (OUTPATIENT)
Age: 50
Discharge: HOME | End: 2020-03-27
Payer: COMMERCIAL

## 2020-03-25 ENCOUNTER — HOSPITAL ENCOUNTER (OUTPATIENT)
Age: 50
Discharge: HOME | End: 2020-03-25
Payer: COMMERCIAL

## 2020-03-25 LAB
MYOGLOBIN: 93 NG/ML (ref 25–58)
RHEUMATOID FACTOR: 10.8 IU/ML
SEDIMENTATION RATE, ERYTHROCYTE: 22 MM (ref 0–20)
TOTAL CK: 421 U/L (ref 26–192)
URIC ACID: 6.7 MG/DL (ref 2.4–5.7)

## 2020-03-25 PROCEDURE — 86038 ANTINUCLEAR ANTIBODIES: CPT

## 2020-03-25 PROCEDURE — 82550 ASSAY OF CK (CPK): CPT

## 2020-03-25 PROCEDURE — 86431 RHEUMATOID FACTOR QUANT: CPT

## 2020-03-25 PROCEDURE — 36415 COLL VENOUS BLD VENIPUNCTURE: CPT

## 2020-03-25 PROCEDURE — 83874 ASSAY OF MYOGLOBIN: CPT

## 2020-03-25 PROCEDURE — 84550 ASSAY OF BLOOD/URIC ACID: CPT

## 2020-03-25 PROCEDURE — 85651 RBC SED RATE NONAUTOMATED: CPT

## 2020-03-25 PROCEDURE — 72110 X-RAY EXAM L-2 SPINE 4/>VWS: CPT

## 2020-03-26 LAB — ANTI-NUCLEAR ANTIBODY (ANA): NEGATIVE

## 2020-04-02 ENCOUNTER — TELEPHONE (OUTPATIENT)
Dept: DIABETES SERVICES | Age: 50
End: 2020-04-02

## 2020-04-14 ENCOUNTER — VIRTUAL VISIT (OUTPATIENT)
Dept: FAMILY MEDICINE CLINIC | Age: 50
End: 2020-04-14
Payer: COMMERCIAL

## 2020-04-14 PROCEDURE — 99442 PR PHYS/QHP TELEPHONE EVALUATION 11-20 MIN: CPT | Performed by: INTERNAL MEDICINE

## 2020-04-14 RX ORDER — METHYLPREDNISOLONE 4 MG/1
TABLET ORAL
Qty: 1 KIT | Refills: 0 | Status: SHIPPED | OUTPATIENT
Start: 2020-04-14 | End: 2020-04-20

## 2020-04-20 ENCOUNTER — TELEPHONE (OUTPATIENT)
Dept: FAMILY MEDICINE CLINIC | Age: 50
End: 2020-04-20

## 2020-04-21 ENCOUNTER — TELEPHONE (OUTPATIENT)
Dept: FAMILY MEDICINE CLINIC | Age: 50
End: 2020-04-21

## 2020-04-21 RX ORDER — GABAPENTIN 400 MG/1
400 CAPSULE ORAL 2 TIMES DAILY
Qty: 60 CAPSULE | Refills: 1 | Status: SHIPPED
Start: 2020-04-21 | End: 2020-04-24 | Stop reason: DRUGHIGH

## 2020-04-21 RX ORDER — MELOXICAM 15 MG/1
15 TABLET ORAL DAILY
Qty: 30 TABLET | Refills: 3 | Status: SHIPPED | OUTPATIENT
Start: 2020-04-21 | End: 2020-07-09 | Stop reason: SDUPTHER

## 2020-04-21 NOTE — TELEPHONE ENCOUNTER
Pt is having stabbing pain in lower left back. She's ok sitting but when she gets up she gets the pain. Spouse says she walks like a Penguin. Pt asking what she can do? Health Maintenance   Topic Date Due    Pneumococcal 0-64 years Vaccine (1 of 1 - PPSV23) 02/15/1975    HIV screen  02/15/1984    Hepatitis B vaccine (1 of 3 - Risk 3-dose series) 02/15/1988    DTaP/Tdap/Td vaccine (1 - Tdap) 02/15/1988    Cervical cancer screen  02/15/1990    Breast cancer screen  02/15/2019    Shingles Vaccine (1 of 2) 02/15/2019    Lipid screen  05/02/2020    Flu vaccine (Season Ended) 10/10/2020 (Originally 9/1/2020)    Diabetic foot exam  07/22/2020    Diabetic microalbuminuria test  07/22/2020    Diabetic retinal exam  07/22/2020    A1C test (Diabetic or Prediabetic)  02/04/2021    Potassium monitoring  02/22/2021    Creatinine monitoring  02/22/2021    Colon Cancer Screen FIT/FOBT  02/25/2021    Hepatitis A vaccine  Aged Out    Hib vaccine  Aged Out    Meningococcal (ACWY) vaccine  Aged Out             (applicable per patient's age: Cancer Screenings, Depression Screening, Fall Risk Screening, Immunizations)    Hemoglobin A1C (%)   Date Value   02/04/2020 6.4   10/10/2019 6.1   07/22/2019 6.2     LDL Cholesterol (mg/dL)   Date Value   05/02/2019 97     AST (U/L)   Date Value   02/22/2020 31     ALT (U/L)   Date Value   02/22/2020 37 (H)     BUN (mg/dL)   Date Value   02/22/2020 14      (goal A1C is < 7)   (goal LDL is <100) need 30-50% reduction from baseline     BP Readings from Last 3 Encounters:   03/20/20 139/89   02/10/20 124/82   02/04/20 100/70    (goal /80)      All Future Testing planned in [facility]:  Lab Frequency Next Occurrence   XR SHOULDER LEFT (MIN 2 VIEWS) Once 05/31/2019       Next Visit Date:  No future appointments.          Patient Active Problem List:     SBO (small bowel obstruction) (HCC)     NOLAN (acute kidney injury) ([facility] [address]     Type 2 diabetes mellitus without complication,

## 2020-04-23 ENCOUNTER — TELEPHONE (OUTPATIENT)
Dept: FAMILY MEDICINE CLINIC | Age: 50
End: 2020-04-23

## 2020-04-23 NOTE — TELEPHONE ENCOUNTER
Bladder incontinence and problems with balance is concerning.  I would advised ER evaluation  Thanks
Ok to give a letter. The main reason is chronic low back pain and leg pain preventing from climbing stairs.   Thanks
Signed  Thanks
current use of insulin ([address]     Morbid obesity due to excess calories ([address]

## 2020-04-24 RX ORDER — GABAPENTIN 600 MG/1
600 TABLET ORAL 2 TIMES DAILY
Qty: 60 TABLET | Refills: 2 | Status: SHIPPED | OUTPATIENT
Start: 2020-04-24 | End: 2021-01-05

## 2020-04-28 ENCOUNTER — TELEPHONE (OUTPATIENT)
Dept: FAMILY MEDICINE CLINIC | Age: 50
End: 2020-04-28

## 2020-05-21 ENCOUNTER — OFFICE VISIT (OUTPATIENT)
Dept: FAMILY MEDICINE CLINIC | Age: 50
End: 2020-05-21
Payer: COMMERCIAL

## 2020-05-21 VITALS
WEIGHT: 243 LBS | TEMPERATURE: 98.3 F | OXYGEN SATURATION: 97 % | SYSTOLIC BLOOD PRESSURE: 148 MMHG | HEART RATE: 95 BPM | DIASTOLIC BLOOD PRESSURE: 92 MMHG | BODY MASS INDEX: 41.71 KG/M2

## 2020-05-21 PROCEDURE — 99213 OFFICE O/P EST LOW 20 MIN: CPT | Performed by: INTERNAL MEDICINE

## 2020-05-21 PROCEDURE — 3017F COLORECTAL CA SCREEN DOC REV: CPT | Performed by: INTERNAL MEDICINE

## 2020-05-21 PROCEDURE — G8417 CALC BMI ABV UP PARAM F/U: HCPCS | Performed by: INTERNAL MEDICINE

## 2020-05-21 PROCEDURE — 1036F TOBACCO NON-USER: CPT | Performed by: INTERNAL MEDICINE

## 2020-05-21 PROCEDURE — G8427 DOCREV CUR MEDS BY ELIG CLIN: HCPCS | Performed by: INTERNAL MEDICINE

## 2020-05-21 RX ORDER — TRAMADOL HYDROCHLORIDE 50 MG/1
50 TABLET ORAL 3 TIMES DAILY PRN
Qty: 21 TABLET | Refills: 0 | Status: SHIPPED | OUTPATIENT
Start: 2020-05-21 | End: 2020-05-21 | Stop reason: SDUPTHER

## 2020-05-21 RX ORDER — LIDOCAINE 40 MG/G
CREAM TOPICAL
COMMUNITY
Start: 2020-04-23 | End: 2020-12-14

## 2020-05-21 RX ORDER — TRAMADOL HYDROCHLORIDE 50 MG/1
50 TABLET ORAL 3 TIMES DAILY PRN
Qty: 21 TABLET | Refills: 0 | Status: SHIPPED | OUTPATIENT
Start: 2020-05-21 | End: 2020-05-28

## 2020-05-21 RX ORDER — SIMVASTATIN 40 MG
TABLET ORAL
COMMUNITY
Start: 2020-05-12 | End: 2020-07-28

## 2020-05-21 ASSESSMENT — ENCOUNTER SYMPTOMS
BACK PAIN: 1
NAUSEA: 0
COUGH: 0
SORE THROAT: 0
ABDOMINAL PAIN: 0
SHORTNESS OF BREATH: 0

## 2020-05-21 NOTE — PROGRESS NOTES
extended release tablet take 1 tablet by mouth once daily with breakfast 2/6/20  Yes TOSHA Sofia CNP   Alcohol Swabs PADS Check blood glucose daily 2/6/20  Yes TOSHA Sofia CNP   blood glucose monitor kit and supplies Check daily 5/8/19  Yes TOSHA Sofia CNP   pregabalin (LYRICA) 75 MG capsule Take 1 capsule by mouth 2 times daily for 60 days. 11/26/19 3/20/20  TOSHA Sofia CNP        Allergies:       Seasonal    Social History:     Tobacco: reports that she has never smoked. She has never used smokeless tobacco.  Alcohol:      reports no history of alcohol use. Drug Use:  reports no history of drug use. Family History:     No family history on file. Review of Systems:         Review of Systems   Constitutional: Negative for activity change, appetite change, chills and fever. HENT: Negative for congestion and sore throat. Respiratory: Negative for cough and shortness of breath. Cardiovascular: Negative for chest pain and palpitations. Gastrointestinal: Negative for abdominal pain and nausea. Genitourinary: Negative for dysuria. Musculoskeletal: Positive for arthralgias and back pain. Skin: Negative for rash. Neurological: Positive for numbness (leg). Negative for dizziness, weakness and headaches. Psychiatric/Behavioral: Negative for dysphoric mood and sleep disturbance. The patient is not nervous/anxious. Physical Exam:     Vitals:  BP (!) 148/92 (Site: Left Upper Arm, Position: Sitting, Cuff Size: Medium Adult)   Pulse 95   Temp 98.3 °F (36.8 °C) (Oral)   Wt 243 lb (110.2 kg)   SpO2 97%   BMI 41.71 kg/m²       Physical Exam  Vitals signs reviewed. Constitutional:       General: She is not in acute distress. Appearance: She is well-developed. HENT:      Head: Normocephalic and atraumatic. Nose: Nose normal.      Mouth/Throat:      Mouth: Mucous membranes are moist.      Pharynx: Oropharynx is clear. Eyes:      General:         Right eye: No discharge. Left eye: No discharge. Conjunctiva/sclera: Conjunctivae normal.   Neck:      Thyroid: No thyromegaly. Cardiovascular:      Rate and Rhythm: Normal rate and regular rhythm. Heart sounds: Normal heart sounds. No murmur. Pulmonary:      Effort: Pulmonary effort is normal.      Breath sounds: Normal breath sounds. No wheezing or rales. Abdominal:      General: Bowel sounds are normal. There is no distension. Palpations: Abdomen is soft. There is no mass. Tenderness: There is no abdominal tenderness. Musculoskeletal: Normal range of motion. Right lower leg: No edema. Left lower leg: No edema. Lymphadenopathy:      Cervical: No cervical adenopathy. Skin:     General: Skin is warm and dry. Coloration: Skin is not pale. Findings: No rash. Neurological:      General: No focal deficit present. Mental Status: She is alert and oriented to person, place, and time. Cranial Nerves: No cranial nerve deficit. Sensory: Sensory deficit (right leg) present. Motor: No weakness.       Coordination: Coordination abnormal.      Gait: Gait abnormal.   Psychiatric:         Behavior: Behavior normal.         Judgment: Judgment normal.               Data:     Lab Results   Component Value Date     02/22/2020    K 4.5 02/22/2020     02/22/2020    CO2 25 02/22/2020    BUN 14 02/22/2020    CREATININE 0.69 02/22/2020    GLUCOSE 125 02/22/2020    PROT 7.3 02/22/2020    LABALBU 4.3 02/22/2020    BILITOT 0.15 02/22/2020    ALKPHOS 114 02/22/2020    AST 31 02/22/2020    ALT 37 02/22/2020     Lab Results   Component Value Date    WBC 7.3 02/22/2020    RBC 5.17 02/22/2020    HGB 12.9 02/22/2020    HCT 41.6 02/22/2020    MCV 80.5 02/22/2020    MCH 25.0 02/22/2020    MCHC 31.0 02/22/2020    RDW 15.3 02/22/2020     02/22/2020    MPV NOT REPORTED 02/22/2020     Lab Results   Component Value Date    TSH quality of care to you and your family. If you cannot score us as very good ( 5 Stars) on any question, please feel free to call the office to discuss how we could have made your experience exceptional.     Thank You! MD Jad Cristobal [facility]  Nargis Colby, PCA        Electronically signed by Jaleel Gongora MD on 5/21/2020 at 2:46 PM           Completed Refills      Requested Prescriptions     Signed Prescriptions Disp Refills    traMADol (ULTRAM) 50 MG tablet 21 tablet 0     Sig: Take 1 tablet by mouth 3 times daily as needed for Pain for up to 7 days. 1 or 2 tabs every 6 hours as needed for pain.

## 2020-05-22 ENCOUNTER — HOSPITAL ENCOUNTER (OUTPATIENT)
Dept: MRI IMAGING | Age: 50
Discharge: HOME | End: 2020-05-24
Payer: COMMERCIAL

## 2020-05-26 ENCOUNTER — TELEPHONE (OUTPATIENT)
Dept: PAIN MANAGEMENT | Age: 50
End: 2020-05-26

## 2020-05-27 RX ORDER — GABAPENTIN 600 MG/1
600 TABLET ORAL 3 TIMES DAILY
Qty: 90 TABLET | Refills: 3 | Status: SHIPPED | OUTPATIENT
Start: 2020-05-27 | End: 2021-01-05

## 2020-06-01 ENCOUNTER — TELEPHONE (OUTPATIENT)
Dept: PAIN MANAGEMENT | Age: 50
End: 2020-06-01

## 2020-06-01 NOTE — TELEPHONE ENCOUNTER
Patient contacted the office again today in regards to previous phone message. Advised the patient the office would be in touch with her with response after speaking with the provider. She voiced understanding.

## 2020-06-02 ENCOUNTER — TELEPHONE (OUTPATIENT)
Dept: PAIN MANAGEMENT | Age: 50
End: 2020-06-02

## 2020-06-02 NOTE — TELEPHONE ENCOUNTER
Patient called back to the office today. She would like to speak with Dr. Chris Stephens. She is upset she has not been contacted in regards to her previous phone encounters. Apologized to the patient and advised her the office would reach out to the provider. She voiced understanding.

## 2020-06-03 ENCOUNTER — TELEPHONE (OUTPATIENT)
Dept: PAIN MANAGEMENT | Age: 50
End: 2020-06-03

## 2020-06-03 NOTE — TELEPHONE ENCOUNTER
Patient contacted the office this morning stating she forgot to report to the office she had 2 separate falls recently within the same day. She does not recall the day or time, but mentioned she wanted to make sure Dr. Jeremias Moyer was aware. She denies experiencing symptoms of dizziness fatigue, or loss of memory. She also mentioned her PCP Dr. Viola Degroot is aware. Patient went on to explain she does not want to have a MRI performed. Advised the patient the provider would be informed and the office would be back in touch with her if any further information is needed. She voiced understanding.

## 2020-06-04 ENCOUNTER — TELEPHONE (OUTPATIENT)
Dept: PAIN MANAGEMENT | Age: 50
End: 2020-06-04

## 2020-06-11 ENCOUNTER — TELEPHONE (OUTPATIENT)
Dept: PAIN MANAGEMENT | Age: 50
End: 2020-06-11

## 2020-06-11 NOTE — TELEPHONE ENCOUNTER
Unfortunately the patient can not come in to the office. Her daughter is not available to come with her until later in the afternoon.

## 2020-06-13 ENCOUNTER — HOSPITAL ENCOUNTER (EMERGENCY)
Age: 50
Discharge: HOME | End: 2020-06-13
Attending: EMERGENCY MEDICINE
Payer: COMMERCIAL

## 2020-06-13 VITALS
HEIGHT: 64 IN | RESPIRATION RATE: 18 BRPM | DIASTOLIC BLOOD PRESSURE: 98 MMHG | SYSTOLIC BLOOD PRESSURE: 163 MMHG | TEMPERATURE: 98.5 F | BODY MASS INDEX: 43.16 KG/M2 | HEART RATE: 102 BPM | OXYGEN SATURATION: 100 % | WEIGHT: 252.8 LBS

## 2020-06-13 PROCEDURE — 6370000000 HC RX 637 (ALT 250 FOR IP): Performed by: EMERGENCY MEDICINE

## 2020-06-13 PROCEDURE — 99281 EMR DPT VST MAYX REQ PHY/QHP: CPT

## 2020-06-13 RX ORDER — TRAMADOL HYDROCHLORIDE 50 MG/1
100 TABLET ORAL ONCE
Status: COMPLETED | OUTPATIENT
Start: 2020-06-13 | End: 2020-06-13

## 2020-06-13 RX ORDER — PREDNISONE 20 MG/1
TABLET ORAL
Qty: 10 TABLET | Refills: 0 | Status: SHIPPED | OUTPATIENT
Start: 2020-06-13 | End: 2020-07-28 | Stop reason: ALTCHOICE

## 2020-06-13 RX ADMIN — TRAMADOL HYDROCHLORIDE 100 MG: 50 TABLET, FILM COATED ORAL at 11:14

## 2020-06-13 ASSESSMENT — PAIN SCALES - GENERAL
PAINLEVEL_OUTOF10: 5
PAINLEVEL_OUTOF10: 5

## 2020-06-13 ASSESSMENT — PAIN DESCRIPTION - PAIN TYPE: TYPE: ACUTE PAIN;CHRONIC PAIN

## 2020-06-13 ASSESSMENT — PAIN DESCRIPTION - LOCATION: LOCATION: GENERALIZED

## 2020-06-13 NOTE — ED PROVIDER NOTES
None     Active member of club or organization: None     Attends meetings of clubs or organizations: None     Relationship status: None    Intimate partner violence     Fear of current or ex partner: None     Emotionally abused: None     Physically abused: None     Forced sexual activity: None   Other Topics Concern    None   Social History Narrative    ** Merged History Encounter **            PHYSICAL EXAM    VITAL SIGNS: There were no vitals taken for this visit. Constitutional:  Well developed, well nourished, no acute distress, non-toxic appearance   HENT:  Atraumatic, external ears normal, nose normal, oropharynx moist.  Neck- normal range of motion, no tenderness, supple   Respiratory:  No respiratory distress, normal breath sounds. Cardiovascular:  Normal rate, normal rhythm, no murmurs, no gallops, no rubs   GI:  Soft, nondistended, normal bowel sounds, nontender   Musculoskeletal: Multiple joint pain. No edema of the joints no redness not warm to touch  Integument:  Well hydrated, no rash   Neurologic: Alert and oriented x3 no focal deficits    RADIOLOGY/PROCEDURES    No orders to display         Labs  Labs Reviewed - No data to display          Summation      Patient Course: Patient has symptoms of fibromyalgia. Patient will be sent home on prednisone. Sugar control as discussed. Controlled medications  use were discussed with the patient. We will up with pain management. Return to ED if worse. ED Medications administered this visit:  Medications - No data to display    New Prescriptions from this visit:    New Prescriptions    PREDNISONE (DELTASONE) 20 MG TABLET    3 tabs daily x 3 days, then 2 tabs daily x 3 days, then 1 tab daily x 3 days       Follow-up:  No follow-up provider specified. Final Impression:   1.  Fibromyalgia               (Please note that portions of this note were completed with a voice recognition program.  Efforts were made to edit the dictations but occasionally words are mis-transcribed.)        Daniela Miller MD  06/13/20 [id number]

## 2020-06-26 ENCOUNTER — TELEPHONE (OUTPATIENT)
Dept: PAIN MANAGEMENT | Age: 50
End: 2020-06-26

## 2020-06-29 ENCOUNTER — TELEPHONE (OUTPATIENT)
Dept: PAIN MANAGEMENT | Age: 50
End: 2020-06-29

## 2020-06-29 NOTE — TELEPHONE ENCOUNTER
Patient contacted the office this afternoon questioning if her medication (Tramadol) was going to be refilled by  Dr. Suad Dumont. Advised the patient the provider would be informed of her questions and the office would be back in contact with herwith a response. She voiced understanding.

## 2020-06-30 RX ORDER — GABAPENTIN 600 MG/1
TABLET ORAL
Qty: 60 TABLET | Refills: 4 | OUTPATIENT
Start: 2020-06-30

## 2020-07-01 RX ORDER — TRAMADOL HYDROCHLORIDE 50 MG/1
50 TABLET ORAL EVERY 8 HOURS PRN
Qty: 21 TABLET | Refills: 0 | Status: SHIPPED | OUTPATIENT
Start: 2020-07-01 | End: 2020-07-08

## 2020-07-01 NOTE — TELEPHONE ENCOUNTER
Consider short term tramadol  X 1 week only. Last refill with Dr Ivelisse Anderson. I will refill it one more time but this is not long term therapy.

## 2020-07-07 ENCOUNTER — TELEPHONE (OUTPATIENT)
Dept: PAIN MANAGEMENT | Age: 50
End: 2020-07-07

## 2020-07-07 ENCOUNTER — TELEPHONE (OUTPATIENT)
Dept: FAMILY MEDICINE CLINIC | Age: 50
End: 2020-07-07

## 2020-07-07 NOTE — TELEPHONE ENCOUNTER
Pt called in complaining of tingling in her hands, feels like stabbing sensation. Pt contacted Dr. Shikha Martinez office but he is not in until Friday. Pt is asking what you recommend?      Health Maintenance   Topic Date Due    Pneumococcal 0-64 years Vaccine (1 of 1 - PPSV23) 02/15/1975    HIV screen  02/15/1984    Hepatitis B vaccine (1 of 3 - Risk 3-dose series) 02/15/1988    DTaP/Tdap/Td vaccine (1 - Tdap) 02/15/1988    Cervical cancer screen  02/15/1990    Breast cancer screen  02/15/2019    Shingles Vaccine (1 of 2) 02/15/2019    Lipid screen  05/02/2020    Diabetic foot exam  07/22/2020    Diabetic microalbuminuria test  07/22/2020    Diabetic retinal exam  07/22/2020    Flu vaccine (1) 09/01/2020    A1C test (Diabetic or Prediabetic)  02/04/2021    Potassium monitoring  02/22/2021    Creatinine monitoring  02/22/2021    Colon Cancer Screen FIT/FOBT  02/25/2021    Hepatitis A vaccine  Aged Out    Hib vaccine  Aged Out    Meningococcal (ACWY) vaccine  Aged Out             (applicable per patient's age: Cancer Screenings, Depression Screening, Fall Risk Screening, Immunizations)    Hemoglobin A1C (%)   Date Value   02/04/2020 6.4   10/10/2019 6.1   07/22/2019 6.2     LDL Cholesterol (mg/dL)   Date Value   05/02/2019 97     AST (U/L)   Date Value   02/22/2020 31     ALT (U/L)   Date Value   02/22/2020 37 (H)     BUN (mg/dL)   Date Value   02/22/2020 14      (goal A1C is < 7)   (goal LDL is <100) need 30-50% reduction from baseline     BP Readings from Last 3 Encounters:   06/13/20 (!) 163/98   05/21/20 (!) 148/92   03/20/20 139/89    (goal /80)      All Future Testing planned in [facility]:  Lab Frequency Next Occurrence       Next Visit Date:  Future Appointments   Date Time Provider Marilyn Lundberg   7/10/2020 12:00 PM Marissa Lopez MD [address]            Patient Active Problem List:     SBO (small bowel obstruction) ([facility] [address]     NOLAN (acute kidney injury) ([facility] [address]     Type 2 diabetes mellitus without complication, without long-term current use of insulin ([address]     Morbid obesity due to excess calories ([address]

## 2020-07-08 ENCOUNTER — TELEPHONE (OUTPATIENT)
Dept: PAIN MANAGEMENT | Age: 50
End: 2020-07-08

## 2020-07-08 RX ORDER — GABAPENTIN 600 MG/1
TABLET ORAL
Qty: 60 TABLET | Refills: 10 | OUTPATIENT
Start: 2020-07-08

## 2020-07-08 NOTE — TELEPHONE ENCOUNTER
Patient was contacted and scheduled for Friday 11:45 am with Dr. Fiona King. Advised the patient to schedule with PCP as well. Patient voiced understanding.

## 2020-07-09 RX ORDER — MELOXICAM 15 MG/1
15 TABLET ORAL DAILY
Qty: 90 TABLET | Refills: 1 | Status: SHIPPED | OUTPATIENT
Start: 2020-07-09 | End: 2020-07-28

## 2020-07-09 NOTE — TELEPHONE ENCOUNTER
Medication pending    Health Maintenance   Topic Date Due    Pneumococcal 0-64 years Vaccine (1 of 1 - PPSV23) 02/15/1975    HIV screen  02/15/1984    Hepatitis B vaccine (1 of 3 - Risk 3-dose series) 02/15/1988    DTaP/Tdap/Td vaccine (1 - Tdap) 02/15/1988    Cervical cancer screen  02/15/1990    Breast cancer screen  02/15/2019    Shingles Vaccine (1 of 2) 02/15/2019    Lipid screen  05/02/2020    Diabetic foot exam  07/22/2020    Diabetic microalbuminuria test  07/22/2020    Diabetic retinal exam  07/22/2020    Flu vaccine (1) 09/01/2020    A1C test (Diabetic or Prediabetic)  02/04/2021    Potassium monitoring  02/22/2021    Creatinine monitoring  02/22/2021    Colon Cancer Screen FIT/FOBT  02/25/2021    Hepatitis A vaccine  Aged Out    Hib vaccine  Aged Out    Meningococcal (ACWY) vaccine  Aged Out             (applicable per patient's age: Cancer Screenings, Depression Screening, Fall Risk Screening, Immunizations)    Hemoglobin A1C (%)   Date Value   02/04/2020 6.4   10/10/2019 6.1   07/22/2019 6.2     LDL Cholesterol (mg/dL)   Date Value   05/02/2019 97     AST (U/L)   Date Value   02/22/2020 31     ALT (U/L)   Date Value   02/22/2020 37 (H)     BUN (mg/dL)   Date Value   02/22/2020 14      (goal A1C is < 7)   (goal LDL is <100) need 30-50% reduction from baseline     BP Readings from Last 3 Encounters:   06/13/20 (!) 163/98   05/21/20 (!) 148/92   03/20/20 139/89    (goal /80)      All Future Testing planned in [facility]:  Lab Frequency Next Occurrence       Next Visit Date:  Future Appointments   Date Time Provider Marilyn Lundberg   7/10/2020 11:45 AM Stanford Vega MD [address]            Patient Active Problem List:     SBO (small bowel obstruction) ([address]     NOLAN (acute kidney injury) ([address]     Type 2 diabetes mellitus without complication, without long-term current use of insulin ([address]     Morbid obesity due to excess calories ([address]

## 2020-07-10 ENCOUNTER — OFFICE VISIT (OUTPATIENT)
Dept: PAIN MANAGEMENT | Age: 50
End: 2020-07-10
Payer: COMMERCIAL

## 2020-07-10 ENCOUNTER — OFFICE VISIT (OUTPATIENT)
Dept: FAMILY MEDICINE CLINIC | Age: 50
End: 2020-07-10
Payer: COMMERCIAL

## 2020-07-10 VITALS
DIASTOLIC BLOOD PRESSURE: 86 MMHG | HEART RATE: 85 BPM | HEIGHT: 64 IN | RESPIRATION RATE: 18 BRPM | SYSTOLIC BLOOD PRESSURE: 138 MMHG | WEIGHT: 259 LBS | OXYGEN SATURATION: 96 % | BODY MASS INDEX: 44.22 KG/M2

## 2020-07-10 VITALS
HEART RATE: 94 BPM | BODY MASS INDEX: 43.36 KG/M2 | TEMPERATURE: 97.3 F | HEIGHT: 64 IN | OXYGEN SATURATION: 95 % | SYSTOLIC BLOOD PRESSURE: 132 MMHG | WEIGHT: 254 LBS | DIASTOLIC BLOOD PRESSURE: 93 MMHG

## 2020-07-10 PROCEDURE — 1036F TOBACCO NON-USER: CPT | Performed by: PHYSICAL MEDICINE & REHABILITATION

## 2020-07-10 PROCEDURE — 3017F COLORECTAL CA SCREEN DOC REV: CPT | Performed by: INTERNAL MEDICINE

## 2020-07-10 PROCEDURE — 1036F TOBACCO NON-USER: CPT | Performed by: INTERNAL MEDICINE

## 2020-07-10 PROCEDURE — G8417 CALC BMI ABV UP PARAM F/U: HCPCS | Performed by: PHYSICAL MEDICINE & REHABILITATION

## 2020-07-10 PROCEDURE — G8427 DOCREV CUR MEDS BY ELIG CLIN: HCPCS | Performed by: PHYSICAL MEDICINE & REHABILITATION

## 2020-07-10 PROCEDURE — 3017F COLORECTAL CA SCREEN DOC REV: CPT | Performed by: PHYSICAL MEDICINE & REHABILITATION

## 2020-07-10 PROCEDURE — G8417 CALC BMI ABV UP PARAM F/U: HCPCS | Performed by: INTERNAL MEDICINE

## 2020-07-10 PROCEDURE — 99214 OFFICE O/P EST MOD 30 MIN: CPT | Performed by: PHYSICAL MEDICINE & REHABILITATION

## 2020-07-10 PROCEDURE — G8427 DOCREV CUR MEDS BY ELIG CLIN: HCPCS | Performed by: INTERNAL MEDICINE

## 2020-07-10 PROCEDURE — 99213 OFFICE O/P EST LOW 20 MIN: CPT | Performed by: INTERNAL MEDICINE

## 2020-07-10 RX ORDER — PREDNISONE 20 MG/1
20 TABLET ORAL DAILY
Qty: 10 TABLET | Refills: 0 | Status: SHIPPED | OUTPATIENT
Start: 2020-07-10 | End: 2020-07-13 | Stop reason: SDUPTHER

## 2020-07-10 ASSESSMENT — ENCOUNTER SYMPTOMS
ABDOMINAL PAIN: 0
SHORTNESS OF BREATH: 0
COUGH: 0
SORE THROAT: 0
BACK PAIN: 1
NAUSEA: 0

## 2020-07-10 NOTE — PROGRESS NOTES
UP    [id number] PHYSICAL MEDICINE & REHABILITATION  [address] [place] [id number]  Dept: [phone]    No referring provider defined for this encounter.        Kahlil Sutton is a 45 y.o. female, who came today due to  new problem- hand pain and tingling    Cause of the symptom(s): no known cause    Onset: 5 months    Quality of Symptoms: shooting, numbness and tingling    Aggravating factors: grasping    Relieving factors: nothing- has tried creams, hot and cold compress    Red Flags: unknown    Treatment done: steroid      Current pain level: 2 on the scale of 0-10 ( 10 being worst)       Allergies   Allergen Reactions    Seasonal        Social History     Socioeconomic History    Marital status:      Spouse name: Not on file    Number of children: Not on file    Years of education: Not on file    Highest education level: Not on file   Occupational History     Employer: DISABLED   Social Needs    Financial resource strain: Not very hard    Food insecurity     Worry: Never true     Inability: Never true    Transportation needs     Medical: No     Non-medical: No   Tobacco Use    Smoking status: Never Smoker    Smokeless tobacco: Never Used   Substance and Sexual Activity    Alcohol use: No    Drug use: No    Sexual activity: Not Currently   Lifestyle    Physical activity     Days per week: Not on file     Minutes per session: Not on file    Stress: Not on file   Relationships    Social connections     Talks on phone: Not on file     Gets together: Not on file     Attends Voodoo service: Not on file     Active member of club or organization: Not on file     Attends meetings of clubs or organizations: Not on file     Relationship status: Not on file    Intimate partner violence     Fear of current or ex partner: Not on file     Emotionally abused: Not on file     Physically abused: Not on file     Forced sexual activity: Not on file   Other

## 2020-07-10 NOTE — PATIENT INSTRUCTIONS
SURVEY:    You may be receiving a survey from [employer] regarding your visit today. Please complete the survey to enable us to provide the highest quality of care to you and your family. If you cannot score us a very good on any question, please call the office to discuss how we could have made your experience a very good one. Thank you.

## 2020-07-10 NOTE — PROGRESS NOTES
HPI Notes    Name: Tigre Rice  : 1970         Chief Complaint:     Chief Complaint   Patient presents with    Other     tingling in hands/arms for months. c/o pains       History of Present Illness:        Jojo Munson presents to office for evaluation of tingling in hands for the past several months. States the tingling is constant. Also has associated achy pain. Feels like hands are \"going to sleep. \"  States the problem is getting worse. Sometimes drops objects due to weakness in hands. No deformities in hand joints. Has no pain in the neck, shoulders or elbows. Tried hot and cool compresses, Ibuprofen, she is also on Gabapentin 600 mg tid and nothing is helping. Unable to sleep at nights. Past Medical History:     Past Medical History:   Diagnosis Date    Anemia     Depression     Diabetes mellitus ([address]     Hyperlipidemia     Hypertension       Reviewed all health maintenance requirements and orderedappropriate tests  Health Maintenance Due   Topic Date Due    Cervical cancer screen  02/15/1990    Breast cancer screen  02/15/2019    Lipid screen  2020    Diabetic foot exam  2020    Diabetic microalbuminuria test  2020    Diabetic retinal exam  2020       Past Surgical History:     Past Surgical History:   Procedure Laterality Date    CHOLECYSTECTOMY      HYSTERECTOMY      TONSILLECTOMY          Medications:       Prior to Admission medications    Medication Sig Start Date End Date Taking?  Authorizing Provider   predniSONE (DELTASONE) 20 MG tablet Take 1 tablet by mouth daily for 10 days 7/10/20 7/20/20 Yes Mitzi Valenzuela MD   meloxicam (MOBIC) 15 MG tablet Take 1 tablet by mouth daily 20  Yes Mitzi Valenzuela MD   predniSONE (DELTASONE) 20 MG tablet 3 tabs daily x 3 days, then 2 tabs daily x 3 days, then 1 tab daily x 3 days 20  Yes Heather Dallas MD   gabapentin (NEURONTIN) 600 MG tablet Take 1 tablet by mouth 3 times daily for 90 days. 5/27/20 8/25/20 Yes Dino Kawasaki, MD   simvastatin (ZOCOR) 40 MG tablet  5/12/20  Yes Historical Provider, MD   PAIN RELIEVING 4 % CREA  4/23/20  Yes Historical Provider, MD   lidocaine (LMX) 4 % cream Apply topically as needed. 3/20/20  Yes Malika Escamilla MD   Cholecalciferol (VITAMIN D) 50 MCG (2000 UT) TABS tablet Take 1 tablet by mouth daily 2/6/20  Yes Pipe Spray, APRN - CNP   lisinopril (PRINIVIL;ZESTRIL) 40 MG tablet Take 1 tablet by mouth daily 2/6/20  Yes Pipe Spray, APRN - CNP   blood glucose monitor strips Check daily. Please dispense true metrix test strips 2/6/20  Yes Pipe Spray, TOSHA - CNP   Lancets MISC Check daily. Please dispense true metrix lancets 2/6/20  Yes Pipe Spray, APRN - CNP   sertraline (ZOLOFT) 100 MG tablet Take 1 tablet by mouth daily 2/6/20  Yes Pipe Spray, APRN - CNP   loratadine (CLARITIN) 10 MG tablet Take 1 tablet by mouth daily 2/6/20  Yes Pipe Spray, APRN - CNP   omeprazole (PRILOSEC) 20 MG delayed release capsule Take 1 capsule by mouth daily 2/6/20  Yes Pipe Spray, APRN - CNP   Multiple Vitamin (RA ONE DAILY ESSENTIAL) TABS take 1 tablet by mouth daily 2/6/20  Yes Pipe Spray, APRN - CNP   pramipexole (MIRAPEX) 1 MG tablet take 1 tablet by mouth three times a day 2/6/20  Yes Pipe Spray, APRN - CNP   metFORMIN (GLUCOPHAGE-XR) 500 MG extended release tablet take 1 tablet by mouth once daily with breakfast 2/6/20  Yes Pipe Spray, APRN - CNP   Alcohol Swabs PADS Check blood glucose daily 2/6/20  Yes Pipe Spray, APRN - CNP   blood glucose monitor kit and supplies Check daily 5/8/19  Yes Pipe Spray, APRN - CNP   gabapentin (NEURONTIN) 600 MG tablet Take 1 tablet by mouth 4 times daily for 30 days. 6/2/20 7/2/20  Dino Kawasaki, MD   gabapentin (NEURONTIN) 600 MG tablet Take 1 tablet by mouth 2 times daily for 30 days.  4/24/20 5/24/20  Malika Escamilla MD   pregabalin (LYRICA) 75 MG capsule Take 1 capsule by mouth 2 times daily for 60 days. 11/26/19 3/20/20  Varsha Echevarria APRN - CNP        Allergies:       Seasonal    Social History:     Tobacco: reports that she has never smoked. She has never used smokeless tobacco.  Alcohol:      reports no history of alcohol use. Drug Use:  reports no history of drug use. Family History:     History reviewed. No pertinent family history. Review of Systems:         Review of Systems   Constitutional: Negative for activity change, appetite change, chills, fatigue and fever. HENT: Negative for congestion and sore throat. Respiratory: Negative for cough and shortness of breath. Cardiovascular: Negative for chest pain and palpitations. Gastrointestinal: Negative for abdominal pain and nausea. Genitourinary: Negative for dysuria. Musculoskeletal: Positive for arthralgias and back pain. Negative for neck pain. Tingling in hands   Skin: Negative for rash. Neurological: Positive for weakness (in hands). Negative for dizziness, tremors and headaches. Psychiatric/Behavioral: The patient is not nervous/anxious. Physical Exam:     Vitals:  /86   Pulse 85   Resp 18   Ht 5' 4\" (1.626 m)   Wt 259 lb (117.5 kg)   SpO2 96%   BMI 44.46 kg/m²       Physical Exam  Vitals signs reviewed. Constitutional:       General: She is not in acute distress. Appearance: She is well-developed. HENT:      Head: Normocephalic and atraumatic. Right Ear: Tympanic membrane and ear canal normal.      Left Ear: Tympanic membrane and ear canal normal.      Nose: Nose normal. No congestion. Mouth/Throat:      Mouth: Mucous membranes are moist.      Pharynx: Oropharynx is clear. Eyes:      General: No scleral icterus. Right eye: No discharge. Left eye: No discharge. Conjunctiva/sclera: Conjunctivae normal.   Neck:      Thyroid: No thyromegaly.    Cardiovascular:      Rate and Rhythm: Normal rate and regular rhythm. Heart sounds: Normal heart sounds. No murmur. Pulmonary:      Effort: Pulmonary effort is normal.      Breath sounds: Normal breath sounds. No wheezing or rales. Abdominal:      General: Bowel sounds are normal. There is no distension. Palpations: Abdomen is soft. There is no mass. Tenderness: There is no abdominal tenderness. Musculoskeletal: Normal range of motion. General: Swelling (both hands mildly swollen) and tenderness (fingers both hands tender on palpation ) present. No deformity. Right lower leg: No edema. Left lower leg: No edema. Lymphadenopathy:      Cervical: No cervical adenopathy. Skin:     General: Skin is warm and dry. Coloration: Skin is not jaundiced or pale. Findings: No rash. Neurological:      General: No focal deficit present. Mental Status: She is alert and oriented to person, place, and time. Cranial Nerves: No cranial nerve deficit. Sensory: No sensory deficit. Motor: No weakness.    Psychiatric:         Mood and Affect: Mood normal.         Behavior: Behavior normal.         Judgment: Judgment normal.               Data:     Lab Results   Component Value Date     02/22/2020    K 4.5 02/22/2020     02/22/2020    CO2 25 02/22/2020    BUN 14 02/22/2020    CREATININE 0.69 02/22/2020    GLUCOSE 125 02/22/2020    PROT 7.3 02/22/2020    LABALBU 4.3 02/22/2020    BILITOT 0.15 02/22/2020    ALKPHOS 114 02/22/2020    AST 31 02/22/2020    ALT 37 02/22/2020     Lab Results   Component Value Date    WBC 7.3 02/22/2020    RBC 5.17 02/22/2020    HGB 12.9 02/22/2020    HCT 41.6 02/22/2020    MCV 80.5 02/22/2020    MCH 25.0 02/22/2020    MCHC 31.0 02/22/2020    RDW 15.3 02/22/2020     02/22/2020    MPV NOT REPORTED 02/22/2020     Lab Results   Component Value Date    TSH 1.00 01/19/2020     Lab Results   Component Value Date    CHOL 168 05/02/2019    HDL 44 05/02/2019    LABA1C 6.4 02/04/2020          Assessment & Plan        Diagnosis Orders   1. Numbness and tingling in both hands   The EMG for evaluation of possible carpal tunnel syndrome. Prescribed prednisone since it helped to control symptoms in the past.  Offered wrist splints, however the patient declined to wear them. Continue on gabapentin, Mobic EMG    predniSONE (DELTASONE) 20 MG tablet   2. Cervical cancer screening   Refer for Pap smear [place], [place], OB/GYN, [facility]   3. Encounter for breast cancer screening other than mammogram   Ordered screening mammogram JAYY DIGITAL SCREEN W OR WO CAD BILATERAL                   Completed Refills   Requested Prescriptions     Signed Prescriptions Disp Refills    predniSONE (DELTASONE) 20 MG tablet 10 tablet 0     Sig: Take 1 tablet by mouth daily for 10 days     No follow-ups on file. Orders Placed This Encounter   Medications    predniSONE (DELTASONE) 20 MG tablet     Sig: Take 1 tablet by mouth daily for 10 days     Dispense:  10 tablet     Refill:  0     Orders Placed This Encounter   Procedures    JAYY DIGITAL SCREEN W OR WO CAD BILATERAL     Standing Status:   Future     Standing Expiration Date:   9/10/2021    Real Cordero CNM, OB/GYN, [facility]     Referral Priority:   Routine     Referral Type:   Eval and Treat     Referral Reason:   Specialty Services Required     Referred to Provider:   TOSHA Caputo CNM     Requested Specialty:   Obstetrics & Gynecology     Number of Visits Requested:   1    EMG     Standing Status:   Future     Standing Expiration Date:   9/8/2020     Order Specific Question:   Which body part? Answer:   wrists Bilateral         Patient Instructions     SURVEY:    You may be receiving a survey from [employer] regarding your visit today. Please complete the survey to enable us to provide the highest quality of care to you and your family.     If you cannot score us a very good on any question, please call the office to discuss how we could have made your experience a very good one. Thank you.       Electronically signed by Ibrahima Berry MD on 7/10/2020 at 8:36 AM           Completed Refills      Requested Prescriptions     Signed Prescriptions Disp Refills    predniSONE (DELTASONE) 20 MG tablet 10 tablet 0     Sig: Take 1 tablet by mouth daily for 10 days

## 2020-07-10 NOTE — PATIENT INSTRUCTIONS
SURVEY:    You may be receiving a survey from [employer] regarding your visit today. Please complete the survey to enable us to provide the highest quality of care to you and your family. If you cannot score us a very good on any question, please call the office to discuss how we could have made your experience a very good one. Thank you.

## 2020-07-13 ENCOUNTER — TELEPHONE (OUTPATIENT)
Dept: PAIN MANAGEMENT | Age: 50
End: 2020-07-13

## 2020-07-13 RX ORDER — PREDNISONE 20 MG/1
20 TABLET ORAL 2 TIMES DAILY
Qty: 20 TABLET | Refills: 0 | Status: SHIPPED | OUTPATIENT
Start: 2020-07-13 | End: 2020-07-23

## 2020-07-13 NOTE — TELEPHONE ENCOUNTER
Medication pending if you approve. Prednisone does help with the tingling. But she is taking it bid because qd was not quite helping    Health Maintenance   Topic Date Due    Cervical cancer screen  02/15/1990    Breast cancer screen  02/15/2019    Lipid screen  05/02/2020    Diabetic foot exam  07/22/2020    Diabetic microalbuminuria test  07/22/2020    Diabetic retinal exam  07/22/2020    Hepatitis B vaccine (1 of 3 - Risk 3-dose series) 07/10/2021 (Originally 2/15/1988)    DTaP/Tdap/Td vaccine (1 - Tdap) 07/10/2021 (Originally 2/15/1988)    Shingles Vaccine (1 of 2) 07/10/2021 (Originally 2/15/2019)    Pneumococcal 0-64 years Vaccine (1 of 1 - PPSV23) 07/10/2021 (Originally 2/15/1975)    HIV screen  07/10/2021 (Originally 2/15/1984)    Flu vaccine (1) 09/01/2020    A1C test (Diabetic or Prediabetic)  02/04/2021    Potassium monitoring  02/22/2021    Creatinine monitoring  02/22/2021    Colon Cancer Screen FIT/FOBT  02/25/2021    Hepatitis A vaccine  Aged Out    Hib vaccine  Aged Out    Meningococcal (ACWY) vaccine  Aged Out             (applicable per patient's age: Cancer Screenings, Depression Screening, Fall Risk Screening, Immunizations)    Hemoglobin A1C (%)   Date Value   02/04/2020 6.4   10/10/2019 6.1   07/22/2019 6.2     LDL Cholesterol (mg/dL)   Date Value   05/02/2019 97     AST (U/L)   Date Value   02/22/2020 31     ALT (U/L)   Date Value   02/22/2020 37 (H)     BUN (mg/dL)   Date Value   02/22/2020 14      (goal A1C is < 7)   (goal LDL is <100) need 30-50% reduction from baseline     BP Readings from Last 3 Encounters:   07/10/20 (!) 132/93   07/10/20 138/86   06/13/20 (!) 163/98    (goal /80)      All Future Testing planned in [facility]:  Lab Frequency Next Occurrence   EMG Once 07/10/2020   JAYY DIGITAL SCREEN W CAD BILATERAL Once 07/24/2020       Next Visit Date:  No future appointments.          Patient Active Problem List:     SBO (small bowel obstruction) (HCC)     NOLAN (acute kidney injury) ([address]     Type 2 diabetes mellitus without complication, without long-term current use of insulin ([address]     Morbid obesity due to excess calories ([address]

## 2020-07-13 NOTE — TELEPHONE ENCOUNTER
Patient contacted the office questioning if Dr. Kristin Seay would prescribe a medication as an alternative to the Prednisone. Patient mentioned Dr. Marilynn Stockton prescribed Prednisone last Friday 07/10/2020. The medication has been very helpful with her hand pain and tingling. She was told the Prednisone will cause issues with her organs should she continue on the medication for long periods of time. She would like to know what other medications she can take to help her pain/discomfort in place of the Prednisone. Advised the patient the provider would be informed of her questions/concerns and the office would be back in contact with her. She voiced understanding.

## 2020-07-23 ENCOUNTER — TELEPHONE (OUTPATIENT)
Dept: PAIN MANAGEMENT | Age: 50
End: 2020-07-23

## 2020-07-23 NOTE — TELEPHONE ENCOUNTER
Patient contacted the office this morning requesting prescription for Prednisone 20 mg BID. Dr. Kimber Sánchez had prescribed the medication previously and she mentioned it had helped with her tingling in the hands. She would like the medication sent to [address] [facility] [phone]. Advised the patient would be informed of her request and the office would be back in contact with her. She voiced understanding.

## 2020-07-28 ENCOUNTER — TELEPHONE (OUTPATIENT)
Dept: PAIN MANAGEMENT | Age: 50
End: 2020-07-28

## 2020-07-28 ENCOUNTER — HOSPITAL ENCOUNTER (EMERGENCY)
Age: 50
Discharge: HOME | End: 2020-07-28
Attending: FAMILY MEDICINE
Payer: COMMERCIAL

## 2020-07-28 ENCOUNTER — APPOINTMENT (OUTPATIENT)
Dept: GENERAL RADIOLOGY | Age: 50
End: 2020-07-28
Payer: COMMERCIAL

## 2020-07-28 VITALS
TEMPERATURE: 96.3 F | DIASTOLIC BLOOD PRESSURE: 96 MMHG | BODY MASS INDEX: 40.82 KG/M2 | HEART RATE: 110 BPM | HEIGHT: 66 IN | OXYGEN SATURATION: 96 % | RESPIRATION RATE: 18 BRPM | WEIGHT: 254 LBS | SYSTOLIC BLOOD PRESSURE: 132 MMHG

## 2020-07-28 PROCEDURE — 99284 EMERGENCY DEPT VISIT MOD MDM: CPT

## 2020-07-28 PROCEDURE — 73030 X-RAY EXAM OF SHOULDER: CPT

## 2020-07-28 RX ORDER — PRAMIPEXOLE DIHYDROCHLORIDE 1 MG/1
TABLET ORAL
Qty: 270 TABLET | Refills: 1 | Status: SHIPPED | OUTPATIENT
Start: 2020-07-28 | End: 2020-12-14

## 2020-07-28 RX ORDER — SIMVASTATIN 40 MG
TABLET ORAL
Qty: 90 TABLET | Refills: 1 | Status: SHIPPED | OUTPATIENT
Start: 2020-07-28 | End: 2021-02-02 | Stop reason: SDUPTHER

## 2020-07-28 RX ORDER — GABAPENTIN 600 MG/1
TABLET ORAL
Qty: 60 TABLET | Refills: 1 | OUTPATIENT
Start: 2020-07-28

## 2020-07-28 RX ORDER — MULTIVITAMIN
TABLET ORAL
Qty: 90 TABLET | Refills: 1 | Status: SHIPPED | OUTPATIENT
Start: 2020-07-28 | End: 2021-02-03 | Stop reason: SDUPTHER

## 2020-07-28 ASSESSMENT — PAIN SCALES - GENERAL: PAINLEVEL_OUTOF10: 7

## 2020-07-28 NOTE — TELEPHONE ENCOUNTER
[facility] contacted the office today in regards to patient referral from Dr. Makeda Reardon. There staff had spoke with patient yesterday. Patient is refusing to complete EMG per physician request. The office would like for Dr. Makeda Reardon to refer patient to another orthopedic office. Advised the staff the provider would be informed. She voiced understanding.

## 2020-07-29 NOTE — ED PROVIDER NOTES
240    Imaging reviewed, radiology report reviewed    Discussed with patient clinical diagnosis of bursitis of the left shoulder, discussed Motrin/time for pain control, will prescribe diclofenac for topical relief, follow with primary care, patient acknowledges    FINAL IMPRESSION      1. Acute bursitis of left shoulder    2. Accidental fall, initial encounter          DISPOSITION/PLAN   D/c    PATIENT REFERRED TO:  MD Brisa Ugarte [facility] [address] [facility]  [phone]    Call         DISCHARGE MEDICATIONS:  Discharge Medication List as of 7/28/2020 10:15 PM              Summation      Patient Course:d/c    ED Medications administered this visit:  Medications - No data to display    New Prescriptions from this visit:    Discharge Medication List as of 7/28/2020 10:15 PM          [facility]:  [facility]  [phone]    Call           Final Impression:   1. Acute bursitis of left shoulder    2.  Accidental fall, initial encounter               (Please note that portions of this note were completed with a voice recognition program.  Efforts were made to edit the dictations but occasionally words are mis-transcribed.)    MD Walter Whiting MD  07/29/20 [id number]

## 2020-07-31 NOTE — TELEPHONE ENCOUNTER
If she will need surgical intervention with carpal tunnel - she needs to have the EMG. Other option if she does not want surgical intervention - gabapentin or wrist splint.

## 2020-08-24 ENCOUNTER — TELEPHONE (OUTPATIENT)
Dept: PAIN MANAGEMENT | Age: 50
End: 2020-08-24

## 2020-08-24 RX ORDER — GABAPENTIN 600 MG/1
TABLET ORAL
Qty: 60 TABLET | Refills: 10 | OUTPATIENT
Start: 2020-08-24

## 2020-08-24 NOTE — TELEPHONE ENCOUNTER
Patient contacted the office requesting refill of Gabapentin be sent to [address] [place] [address] in [facility]. Patient states she had recently moved and has had to switch pharmacys. Advised the patient the provider would be informed and the office would contact her with any further needed information. She voiced understanding.

## 2020-08-25 RX ORDER — GABAPENTIN 600 MG/1
TABLET ORAL
Qty: 60 TABLET | Refills: 10 | OUTPATIENT
Start: 2020-08-25

## 2020-09-22 RX ORDER — GABAPENTIN 600 MG/1
TABLET ORAL
Qty: 60 TABLET | Refills: 3 | Status: SHIPPED | OUTPATIENT
Start: 2020-09-22 | End: 2020-10-22

## 2020-10-12 RX ORDER — METFORMIN HYDROCHLORIDE 500 MG/1
TABLET, EXTENDED RELEASE ORAL
Qty: 30 TABLET | Refills: 1 | Status: SHIPPED | OUTPATIENT
Start: 2020-10-12 | End: 2021-01-06 | Stop reason: SDUPTHER

## 2020-10-12 RX ORDER — LORATADINE 10 MG/1
TABLET ORAL
Qty: 30 TABLET | Refills: 2 | Status: SHIPPED | OUTPATIENT
Start: 2020-10-12 | End: 2021-02-02 | Stop reason: SDUPTHER

## 2020-10-12 RX ORDER — CHOLECALCIFEROL (VITAMIN D3) 50 MCG
TABLET ORAL
Qty: 30 TABLET | Refills: 1 | Status: SHIPPED | OUTPATIENT
Start: 2020-10-12 | End: 2022-04-12 | Stop reason: ALTCHOICE

## 2020-10-12 RX ORDER — SERTRALINE HYDROCHLORIDE 100 MG/1
TABLET, FILM COATED ORAL
Qty: 30 TABLET | Refills: 0 | Status: SHIPPED | OUTPATIENT
Start: 2020-10-12

## 2020-10-12 RX ORDER — LISINOPRIL 40 MG/1
TABLET ORAL
Qty: 30 TABLET | Refills: 0 | Status: SHIPPED | OUTPATIENT
Start: 2020-10-12

## 2020-10-12 RX ORDER — OMEPRAZOLE 20 MG/1
CAPSULE, DELAYED RELEASE ORAL
Qty: 30 CAPSULE | Refills: 2 | Status: SHIPPED | OUTPATIENT
Start: 2020-10-12 | End: 2021-01-06 | Stop reason: SDUPTHER

## 2020-10-12 NOTE — TELEPHONE ENCOUNTER
Refill request  Last OV: 7/10/2020  Last RX: 2/6/2020   Next scheduled apt: Visit date not found  Medication pended if approved.

## 2020-10-28 RX ORDER — LISINOPRIL 40 MG/1
TABLET ORAL
Qty: 30 TABLET | Refills: 10 | OUTPATIENT
Start: 2020-10-28

## 2020-10-28 RX ORDER — GLUCOSAM/CHON-MSM1/C/MANG/BOSW 500-416.6
TABLET ORAL
Qty: 100 EACH | Refills: 10 | Status: SHIPPED | OUTPATIENT
Start: 2020-10-28

## 2020-10-28 RX ORDER — SERTRALINE HYDROCHLORIDE 100 MG/1
TABLET, FILM COATED ORAL
Qty: 30 TABLET | Refills: 10 | OUTPATIENT
Start: 2020-10-28

## 2020-11-03 RX ORDER — SERTRALINE HYDROCHLORIDE 100 MG/1
100 TABLET, FILM COATED ORAL DAILY
Qty: 90 TABLET | Refills: 1 | OUTPATIENT
Start: 2020-11-03

## 2020-11-03 RX ORDER — LISINOPRIL 40 MG/1
40 TABLET ORAL DAILY
Qty: 90 TABLET | Refills: 1 | OUTPATIENT
Start: 2020-11-03

## 2020-11-03 NOTE — TELEPHONE ENCOUNTER
Pharmacy contacted the office regarding patient need of refills for both Sertraline 100 mg and Lisinopril 40 mg.
calories ([address]

## 2020-11-30 RX ORDER — SERTRALINE HYDROCHLORIDE 100 MG/1
TABLET, FILM COATED ORAL
Qty: 30 TABLET | Refills: 0 | OUTPATIENT
Start: 2020-11-30

## 2020-11-30 RX ORDER — CHOLECALCIFEROL (VITAMIN D3) 50 MCG
TABLET ORAL
Qty: 30 TABLET | Refills: 1 | OUTPATIENT
Start: 2020-11-30

## 2020-11-30 RX ORDER — METFORMIN HYDROCHLORIDE 500 MG/1
TABLET, EXTENDED RELEASE ORAL
Qty: 30 TABLET | Refills: 1 | OUTPATIENT
Start: 2020-11-30

## 2020-11-30 RX ORDER — LISINOPRIL 40 MG/1
TABLET ORAL
Qty: 30 TABLET | Refills: 0 | OUTPATIENT
Start: 2020-11-30

## 2020-12-08 RX ORDER — LISINOPRIL 40 MG/1
40 TABLET ORAL DAILY
Qty: 90 TABLET | Refills: 1 | Status: SHIPPED | OUTPATIENT
Start: 2020-12-08 | End: 2020-12-14 | Stop reason: SDUPTHER

## 2020-12-08 RX ORDER — SERTRALINE HYDROCHLORIDE 100 MG/1
100 TABLET, FILM COATED ORAL DAILY
Qty: 90 TABLET | Refills: 1 | Status: SHIPPED | OUTPATIENT
Start: 2020-12-08 | End: 2020-12-14 | Stop reason: SDUPTHER

## 2020-12-08 NOTE — TELEPHONE ENCOUNTER
Health Maintenance   Topic Date Due    Cervical cancer screen  02/15/1990    Breast cancer screen  02/15/2019    Lipid screen  05/02/2020    Diabetic foot exam  07/22/2020    Diabetic microalbuminuria test  07/22/2020    Diabetic retinal exam  07/22/2020    Flu vaccine (1) 09/01/2020    Hepatitis B vaccine (1 of 3 - Risk 3-dose series) 07/10/2021 (Originally 2/15/1988)    DTaP/Tdap/Td vaccine (1 - Tdap) 07/10/2021 (Originally 2/15/1988)    Shingles Vaccine (1 of 2) 07/10/2021 (Originally 2/15/2019)    HIV screen  07/10/2021 (Originally 2/15/1984)    A1C test (Diabetic or Prediabetic)  02/04/2021    Potassium monitoring  02/22/2021    Creatinine monitoring  02/22/2021    Colon Cancer Screen FIT/FOBT  02/25/2021    Hepatitis A vaccine  Aged Out    Hib vaccine  Aged Out    Meningococcal (ACWY) vaccine  Aged Out    Pneumococcal 0-64 years Vaccine  Aged Out             (applicable per patient's age: Cancer Screenings, Depression Screening, Fall Risk Screening, Immunizations)    Hemoglobin A1C (%)   Date Value   02/04/2020 6.4   10/10/2019 6.1   07/22/2019 6.2     LDL Cholesterol (mg/dL)   Date Value   05/02/2019 97     AST (U/L)   Date Value   02/22/2020 31     ALT (U/L)   Date Value   02/22/2020 37 (H)     BUN (mg/dL)   Date Value   02/22/2020 14      (goal A1C is < 7)   (goal LDL is <100) need 30-50% reduction from baseline     BP Readings from Last 3 Encounters:   07/28/20 (!) 132/96   07/10/20 (!) 132/93   07/10/20 138/86    (goal /80)      All Future Testing planned in [facility]:  Lab Frequency Next Occurrence   JAYY DIGITAL SCREEN W CAD BILATERAL Once 09/09/2021       Next Visit Date:  No future appointments.          Patient Active Problem List:     SBO (small bowel obstruction) (HCC)     NOLAN (acute kidney injury) ([address]     Type 2 diabetes mellitus without complication, without long-term current use of insulin ([address]     Morbid obesity due to excess calories ([address]

## 2020-12-14 ENCOUNTER — OFFICE VISIT (OUTPATIENT)
Dept: FAMILY MEDICINE CLINIC | Age: 50
End: 2020-12-14
Payer: COMMERCIAL

## 2020-12-14 VITALS
SYSTOLIC BLOOD PRESSURE: 130 MMHG | OXYGEN SATURATION: 98 % | HEART RATE: 86 BPM | TEMPERATURE: 97.3 F | DIASTOLIC BLOOD PRESSURE: 80 MMHG

## 2020-12-14 PROCEDURE — G8427 DOCREV CUR MEDS BY ELIG CLIN: HCPCS | Performed by: STUDENT IN AN ORGANIZED HEALTH CARE EDUCATION/TRAINING PROGRAM

## 2020-12-14 PROCEDURE — 1036F TOBACCO NON-USER: CPT | Performed by: STUDENT IN AN ORGANIZED HEALTH CARE EDUCATION/TRAINING PROGRAM

## 2020-12-14 PROCEDURE — G8484 FLU IMMUNIZE NO ADMIN: HCPCS | Performed by: STUDENT IN AN ORGANIZED HEALTH CARE EDUCATION/TRAINING PROGRAM

## 2020-12-14 PROCEDURE — 99214 OFFICE O/P EST MOD 30 MIN: CPT | Performed by: STUDENT IN AN ORGANIZED HEALTH CARE EDUCATION/TRAINING PROGRAM

## 2020-12-14 PROCEDURE — 3017F COLORECTAL CA SCREEN DOC REV: CPT | Performed by: STUDENT IN AN ORGANIZED HEALTH CARE EDUCATION/TRAINING PROGRAM

## 2020-12-14 PROCEDURE — G8417 CALC BMI ABV UP PARAM F/U: HCPCS | Performed by: STUDENT IN AN ORGANIZED HEALTH CARE EDUCATION/TRAINING PROGRAM

## 2020-12-14 ASSESSMENT — ENCOUNTER SYMPTOMS
NAUSEA: 0
VOMITING: 0
BACK PAIN: 0
RHINORRHEA: 0
ABDOMINAL PAIN: 0
COUGH: 0
DIARRHEA: 0
SINUS PAIN: 0
WHEEZING: 0

## 2020-12-14 NOTE — PROGRESS NOTES
HPI Notes    Name: Angel Ann  : 1970         Chief Complaint:     Chief Complaint   Patient presents with    Knee Pain     Patient C/O Left Knee pain . Patient doens't remember any trauma to the knee. Just can't put weight on it . She went to ED on 20 at [facility]. patient states she's starting to feel pain in her lower back and tingling in her fingers.  Hand Pain    Lower Back Pain       History of Present Illness:        HPI    Left knee pain: Patient C/O Left Knee pain . Patient doens't remember any trauma to the knee. Just can't put weight on it . She went to ED on 20 at [facility]. Has had knee pain for few days, sharp stabbing pain over medial knee, walking makes it feel worse, nothing makes it feel better (but elevation has somewhat improved. Denies erythema, claudication, chest pain, hemoptysis, fever is never had a blood clot for additionally, patient has intellectual disability and the learning disability, is accompanied by her daughter who is of normal intelligence. Hand pain: carpal tunnel, workup not completed due to patient refusal, daughter did not notice. Essentially unchanged from history and physical exam and 2020.     Past Medical History:     Past Medical History:   Diagnosis Date    Anemia     Depression     Diabetes mellitus ([facility] [address]     Hyperlipidemia     Hypertension       Reviewed all health maintenance requirements and ordered appropriate tests  Health Maintenance Due   Topic Date Due    Cervical cancer screen  02/15/1990    Breast cancer screen  02/15/2019    Lipid screen  2020    Diabetic foot exam  2020    Diabetic microalbuminuria test  2020    Diabetic retinal exam  2020    Flu vaccine (1) 2020       Past Surgical History:     Past Surgical History:   Procedure Laterality Date    CHOLECYSTECTOMY      HYSTERECTOMY      TONSILLECTOMY          Medications:       Prior to Admission medications Social History:     Tobacco:    reports that she has never smoked. She has never used smokeless tobacco.  Alcohol:      reports no history of alcohol use. Drug Use:  reports no history of drug use. Family History:     No family history on file. Review of Systems:         Review of Systems   Constitutional: Negative for fever. HENT: Negative for rhinorrhea, sinus pain and sneezing. Respiratory: Negative for cough and wheezing. Cardiovascular: Negative for chest pain. Gastrointestinal: Negative for abdominal pain, diarrhea, nausea and vomiting. Genitourinary: Negative for menstrual problem and vaginal discharge. Musculoskeletal: Positive for arthralgias. Negative for back pain. Skin: Negative for rash. Neurological: Negative for headaches. Paresthesias of both hands   Psychiatric/Behavioral: Negative for sleep disturbance. Physical Exam:     Vitals:  /80   Pulse 86   Temp 97.3 °F (36.3 °C) (Temporal)   LMP  (LMP Unknown)   SpO2 98%       Physical Exam  Vitals signs and nursing note reviewed. Constitutional:       General: She is not in acute distress. Appearance: Normal appearance. She is normal weight. Musculoskeletal: Normal range of motion. General: No swelling or tenderness. Right lower leg: No edema. Left lower leg: No edema. Comments: Positive Tinel's sign, positive Phalen test bilaterally   Skin:     General: Skin is warm and dry. Capillary Refill: Capillary refill takes less than 2 seconds. Neurological:      General: No focal deficit present. Mental Status: She is alert and oriented to person, place, and time. Mental status is at baseline. Cranial Nerves: No cranial nerve deficit. Psychiatric:         Mood and Affect: Mood normal.         Behavior: Behavior normal.         Thought Content:  Thought content normal.                 Data:     Lab Results   Component Value Date     02/22/2020 K 4.5 02/22/2020     02/22/2020    CO2 25 02/22/2020    BUN 14 02/22/2020    CREATININE 0.69 02/22/2020    GLUCOSE 125 02/22/2020    PROT 7.3 02/22/2020    LABALBU 4.3 02/22/2020    BILITOT 0.15 02/22/2020    ALKPHOS 114 02/22/2020    AST 31 02/22/2020    ALT 37 02/22/2020     Lab Results   Component Value Date    WBC 7.3 02/22/2020    RBC 5.17 02/22/2020    HGB 12.9 02/22/2020    HCT 41.6 02/22/2020    MCV 80.5 02/22/2020    MCH 25.0 02/22/2020    MCHC 31.0 02/22/2020    RDW 15.3 02/22/2020     02/22/2020    MPV NOT REPORTED 02/22/2020     Lab Results   Component Value Date    TSH 1.00 01/19/2020     Lab Results   Component Value Date    CHOL 168 05/02/2019    HDL 44 05/02/2019    LABA1C 6.4 02/04/2020          Assessment & Plan        Diagnosis Orders   1. Acute pain of left knee     2. Carpal tunnel syndrome on both sides  EMG       1. I do not believe that the patient's acute pain is due to a blood clot, there is no leg claudication, no signs and symptoms of a pulmonary embolus, I believe that we should manage this expectantly, review records from [facility], as patient has intellectual disability and poor understanding of her care at that facility. If upon review work-up did suggest DVT, will continue to evaluate accordingly. I did discuss with patient and her daughter that if the patient's intellectual disability is significantly impacting her ability to make her own decisions, she should consider making [employer], her daughter, her medical decision-maker, or legal guardian. We have added her as a primary contact. 2.  Patient has a grossly positive carpal tunnel syndrome, and has not completed work-up via EMG. Have reordered EMG. Have sent prescription for carpal tunnel braces and instructed patient to wear them every night.               Completed Refills   Requested Prescriptions      No prescriptions requested or ordered in this encounter Return in about 3 months (around 3/14/2021) for Well Visit. No orders of the defined types were placed in this encounter. Orders Placed This Encounter   Procedures    EMG     Standing Status:   Future     Standing Expiration Date:   2/12/2021     Order Specific Question:   Which body part? Answer:   bilateral upper extremities         Patient Instructions       SURVEY:    You may be receiving a survey from [employer] regarding your visit today. Please complete the survey to enable us to provide the highest quality of care to you and your family. If you cannot score us a very good on any question, please call the office to discuss how we could of made your experience a very good one. Thank you. Patient Education        Carpal Tunnel Syndrome: Exercises  Introduction  Here are some examples of exercises for you to try. The exercises may be suggested for a condition or for rehabilitation. Start each exercise slowly. Ease off the exercises if you start to have pain. You will be told when to start these exercises and which ones will work best for you. Warm-up stretches  When you no longer have pain or numbness, you can do exercises to help prevent carpal tunnel syndrome from coming back. Do not do any stretch or movement that is uncomfortable or painful. 1. Rotate your wrist up, down, and from side to side. Repeat 4 times. 2. Stretch your fingers far apart. Relax them, and then stretch them again. Repeat 4 times. 3. Stretch your thumb by pulling it back gently, holding it, and then releasing it. Repeat 4 times. How to do the exercises  Prayer stretch   1. Start with your palms together in front of your chest just below your chin. 2. Slowly lower your hands toward your waistline, keeping your hands close to your stomach and your palms together until you feel a mild to moderate stretch under your forearms. 3. Hold for at least 15 to 30 seconds. Repeat 2 to 4 times.     Wrist flexor stretch 1. Extend your arm in front of you with your palm up. 2. Bend your wrist, pointing your hand toward the floor. 3. With your other hand, gently bend your wrist farther until you feel a mild to moderate stretch in your forearm. 4. Hold for at least 15 to 30 seconds. Repeat 2 to 4 times. Wrist extensor stretch   1. Repeat steps 1 through 4 of the stretch above, but begin with your extended hand palm down. Follow-up care is a key part of your treatment and safety. Be sure to make and go to all appointments, and call your doctor if you are having problems. It's also a good idea to know your test results and keep a list of the medicines you take. Where can you learn more? Go to https://[email or web address] org and sign in to your [employer] account. Enter C938 in the [employer] box to learn more about \"Carpal Tunnel Syndrome: Exercises. \"     If you do not have an account, please click on the \"Sign Up Now\" link. Current as of: March 2, 2020               Content Version: 12.6  © 5889-1264 [employer]. Care instructions adapted under license by [facility] [place] ([facility]). If you have questions about a medical condition or this instruction, always ask your healthcare professional. [place] [address] any warranty or liability for your use of this information. Electronically signed by Melinda Viera DO on 12/14/2020 at 4:30 PM           Completed Refills   Requested Prescriptions      No prescriptions requested or ordered in this encounter         Bri Gunn received counseling on the following healthy behaviors: medication adherence  Reviewed prior labs and health maintenance. Continue current medications, diet and exercise. Discussed use, benefit, and side effects of prescribed medications. Barriers to medication compliance addressed. Patient given educational materials - see patient instructions. All patient questions answered. Patient voiced understanding.

## 2020-12-14 NOTE — PATIENT INSTRUCTIONS
SURVEY:    You may be receiving a survey from [employer] regarding your visit today. Please complete the survey to enable us to provide the highest quality of care to you and your family. If you cannot score us a very good on any question, please call the office to discuss how we could of made your experience a very good one. Thank you. Patient Education        Carpal Tunnel Syndrome: Exercises  Introduction  Here are some examples of exercises for you to try. The exercises may be suggested for a condition or for rehabilitation. Start each exercise slowly. Ease off the exercises if you start to have pain. You will be told when to start these exercises and which ones will work best for you. Warm-up stretches  When you no longer have pain or numbness, you can do exercises to help prevent carpal tunnel syndrome from coming back. Do not do any stretch or movement that is uncomfortable or painful. 1. Rotate your wrist up, down, and from side to side. Repeat 4 times. 2. Stretch your fingers far apart. Relax them, and then stretch them again. Repeat 4 times. 3. Stretch your thumb by pulling it back gently, holding it, and then releasing it. Repeat 4 times. How to do the exercises  Prayer stretch   1. Start with your palms together in front of your chest just below your chin. 2. Slowly lower your hands toward your waistline, keeping your hands close to your stomach and your palms together until you feel a mild to moderate stretch under your forearms. 3. Hold for at least 15 to 30 seconds. Repeat 2 to 4 times. Wrist flexor stretch   1. Extend your arm in front of you with your palm up. 2. Bend your wrist, pointing your hand toward the floor. 3. With your other hand, gently bend your wrist farther until you feel a mild to moderate stretch in your forearm. 4. Hold for at least 15 to 30 seconds. Repeat 2 to 4 times.     Wrist extensor stretch

## 2020-12-15 ENCOUNTER — TELEPHONE (OUTPATIENT)
Dept: FAMILY MEDICINE CLINIC | Age: 50
End: 2020-12-15

## 2020-12-15 NOTE — TELEPHONE ENCOUNTER
Last OV: 12/14/2020   Next scheduled apt: 3/15/2021  [id number]  # [phone]      Patient called stating knee pain is still extremely painful. Aleve not giving any relief . Requesting something else for pain relief. - Thank you!

## 2020-12-16 NOTE — TELEPHONE ENCOUNTER
I would recommend she or her daughter  some over-the-counter muscle rub, such as Tiger balm, Aspercreme, or similar product. She may use this 4 times a day as needed, additionally, she can consider topical diclofenac gel over-the-counter.

## 2020-12-22 ENCOUNTER — TELEPHONE (OUTPATIENT)
Dept: FAMILY MEDICINE CLINIC | Age: 50
End: 2020-12-22

## 2020-12-22 NOTE — TELEPHONE ENCOUNTER
Please advise on what else patient can do for her hand pain and tingling  Last OV 12/14/20 for hand pain, carpal tunnel syndrome  I told patient usually treatment is surgery or wearing the braces to help with symptoms.

## 2020-12-22 NOTE — TELEPHONE ENCOUNTER
Hemanth Carnes was in the office on 12/14 and did discuss hand pain. She said she took aleve and that is not helping. I do see in the notes where an EMG was ordered, but referral was not brought up to front. In the meantime she is wanting to know what else she can take for the tingling in her hands. Please let her daughter know.     [address] Maintenance   Topic Date Due    Cervical cancer screen  02/15/1990    Breast cancer screen  02/15/2019    Lipid screen  05/02/2020    Diabetic foot exam  07/22/2020    Diabetic microalbuminuria test  07/22/2020    Diabetic retinal exam  07/22/2020    Flu vaccine (1) 09/01/2020    Hepatitis B vaccine (1 of 3 - Risk 3-dose series) 07/10/2021 (Originally 2/15/1988)    DTaP/Tdap/Td vaccine (1 - Tdap) 07/10/2021 (Originally 2/15/1988)    Shingles Vaccine (1 of 2) 07/10/2021 (Originally 2/15/2019)    Pneumococcal 0-64 years Vaccine (1 of 1 - PPSV23) 07/10/2021 (Originally 2/15/1975)    HIV screen  07/10/2021 (Originally 2/15/1984)    A1C test (Diabetic or Prediabetic)  02/04/2021    Potassium monitoring  02/22/2021    Creatinine monitoring  02/22/2021    Colon Cancer Screen FIT/FOBT  02/25/2021    Hepatitis A vaccine  Aged Out    Hib vaccine  Aged Out    Meningococcal (ACWY) vaccine  Aged Out             (applicable per patient's age: Cancer Screenings, Depression Screening, Fall Risk Screening, Immunizations)    Hemoglobin A1C (%)   Date Value   02/04/2020 6.4   10/10/2019 6.1   07/22/2019 6.2     LDL Cholesterol (mg/dL)   Date Value   05/02/2019 97     AST (U/L)   Date Value   02/22/2020 31     ALT (U/L)   Date Value   02/22/2020 37 (H)     BUN (mg/dL)   Date Value   02/22/2020 14      (goal A1C is < 7)   (goal LDL is <100) need 30-50% reduction from baseline     BP Readings from Last 3 Encounters:   12/14/20 130/80   07/28/20 (!) 132/96   07/10/20 (!) 132/93    (goal /80)      All Future Testing planned in [facility]:  Lab Frequency Next Occurrence   JAYY DIGITAL SCREEN W CAD BILATERAL Once 09/09/2021   EMG Once 02/11/2021       Next Visit Date:  Future Appointments   Date Time Provider Marilyn Lundberg   3/15/2021 10:00 AM DO Mariana Edmondson [facility]            Patient Active Problem List:     SBO (small bowel obstruction) ([address]     NOLAN (acute kidney injury) ([address]     Type 2 diabetes mellitus without complication, without long-term current use of insulin ([address]     Morbid obesity due to excess calories ([address]

## 2020-12-28 RX ORDER — GABAPENTIN 600 MG/1
TABLET ORAL
Qty: 60 TABLET | Refills: 10 | OUTPATIENT
Start: 2020-12-28 | End: 2021-01-27

## 2020-12-28 RX ORDER — OMEPRAZOLE 20 MG/1
CAPSULE, DELAYED RELEASE ORAL
Qty: 30 CAPSULE | Refills: 10 | OUTPATIENT
Start: 2020-12-28

## 2020-12-28 RX ORDER — METFORMIN HYDROCHLORIDE 500 MG/1
TABLET, EXTENDED RELEASE ORAL
Qty: 30 TABLET | Refills: 10 | OUTPATIENT
Start: 2020-12-28

## 2020-12-30 ENCOUNTER — TELEPHONE (OUTPATIENT)
Dept: FAMILY MEDICINE CLINIC | Age: 50
End: 2020-12-30

## 2020-12-30 NOTE — TELEPHONE ENCOUNTER
Patient c/o upper leg pain. She states it is by her butt. She says the pain is frequent. She is taking aleve 4 tablets every 6-8 hours per day with no relief. She is also c/o a rash on her lower stomach. She is using pinxav with no relief. She states it is burning. Please advise.     Health Maintenance   Topic Date Due    Hepatitis C screen  1970    Cervical cancer screen  02/15/1990    Breast cancer screen  02/15/2019    Lipid screen  05/02/2020    Diabetic foot exam  07/22/2020    Diabetic microalbuminuria test  07/22/2020    Diabetic retinal exam  07/22/2020    Flu vaccine (1) 09/01/2020    Hepatitis B vaccine (1 of 3 - Risk 3-dose series) 07/10/2021 (Originally 2/15/1988)    DTaP/Tdap/Td vaccine (1 - Tdap) 07/10/2021 (Originally 2/15/1988)    Shingles Vaccine (1 of 2) 07/10/2021 (Originally 2/15/2019)    Pneumococcal 0-64 years Vaccine (1 of 1 - PPSV23) 07/10/2021 (Originally 2/15/1975)    HIV screen  07/10/2021 (Originally 2/15/1984)    A1C test (Diabetic or Prediabetic)  02/04/2021    Potassium monitoring  02/22/2021    Creatinine monitoring  02/22/2021    Colon Cancer Screen FIT/FOBT  02/25/2021    Hepatitis A vaccine  Aged Out    Hib vaccine  Aged Out    Meningococcal (ACWY) vaccine  Aged Out             (applicable per patient's age: Cancer Screenings, Depression Screening, Fall Risk Screening, Immunizations)    Hemoglobin A1C (%)   Date Value   02/04/2020 6.4   10/10/2019 6.1   07/22/2019 6.2     LDL Cholesterol (mg/dL)   Date Value   05/02/2019 97     AST (U/L)   Date Value   02/22/2020 31     ALT (U/L)   Date Value   02/22/2020 37 (H)     BUN (mg/dL)   Date Value   02/22/2020 14      (goal A1C is < 7)   (goal LDL is <100) need 30-50% reduction from baseline     BP Readings from Last 3 Encounters:   12/14/20 130/80   07/28/20 (!) 132/96   07/10/20 (!) 132/93    (goal /80)      All Future Testing planned in [facility]:  Lab Frequency Next Occurrence   JAYY DIGITAL SCREEN W CAD BILATERAL Once 09/09/2021   EMG Once 02/11/2021       Next Visit Date:  Future Appointments   Date Time Provider Marilyn Lundberg   3/15/2021 10:00 AM DO Ranjana Samuels [facility]            Patient Active Problem List:     SBO (small bowel obstruction) ([address]     NOLAN (acute kidney injury) ([address]     Type 2 diabetes mellitus without complication, without long-term current use of insulin ([address]     Morbid obesity due to excess calories ([address]

## 2021-01-04 RX ORDER — CHOLECALCIFEROL (VITAMIN D3) 50 MCG
TABLET ORAL
Qty: 30 TABLET | Refills: 1 | OUTPATIENT
Start: 2021-01-04

## 2021-01-05 ENCOUNTER — OFFICE VISIT (OUTPATIENT)
Dept: FAMILY MEDICINE CLINIC | Age: 51
End: 2021-01-05
Payer: COMMERCIAL

## 2021-01-05 VITALS
WEIGHT: 249 LBS | SYSTOLIC BLOOD PRESSURE: 122 MMHG | TEMPERATURE: 97.5 F | BODY MASS INDEX: 40.19 KG/M2 | DIASTOLIC BLOOD PRESSURE: 82 MMHG | HEART RATE: 86 BPM | OXYGEN SATURATION: 97 %

## 2021-01-05 DIAGNOSIS — S46.912A MUSCLE STRAIN OF LEFT SHOULDER, INITIAL ENCOUNTER: Primary | ICD-10-CM

## 2021-01-05 DIAGNOSIS — S76.012A STRAIN OF LEFT HIP ADDUCTOR MUSCLE, INITIAL ENCOUNTER: ICD-10-CM

## 2021-01-05 DIAGNOSIS — S46.911A MUSCLE STRAIN OF RIGHT SHOULDER, INITIAL ENCOUNTER: ICD-10-CM

## 2021-01-05 PROCEDURE — G8417 CALC BMI ABV UP PARAM F/U: HCPCS | Performed by: STUDENT IN AN ORGANIZED HEALTH CARE EDUCATION/TRAINING PROGRAM

## 2021-01-05 PROCEDURE — 99213 OFFICE O/P EST LOW 20 MIN: CPT | Performed by: STUDENT IN AN ORGANIZED HEALTH CARE EDUCATION/TRAINING PROGRAM

## 2021-01-05 PROCEDURE — 1036F TOBACCO NON-USER: CPT | Performed by: STUDENT IN AN ORGANIZED HEALTH CARE EDUCATION/TRAINING PROGRAM

## 2021-01-05 PROCEDURE — G8427 DOCREV CUR MEDS BY ELIG CLIN: HCPCS | Performed by: STUDENT IN AN ORGANIZED HEALTH CARE EDUCATION/TRAINING PROGRAM

## 2021-01-05 PROCEDURE — G8484 FLU IMMUNIZE NO ADMIN: HCPCS | Performed by: STUDENT IN AN ORGANIZED HEALTH CARE EDUCATION/TRAINING PROGRAM

## 2021-01-05 PROCEDURE — 3017F COLORECTAL CA SCREEN DOC REV: CPT | Performed by: STUDENT IN AN ORGANIZED HEALTH CARE EDUCATION/TRAINING PROGRAM

## 2021-01-05 RX ORDER — GABAPENTIN 600 MG/1
600 TABLET ORAL 2 TIMES DAILY
Qty: 60 TABLET | Refills: 0 | OUTPATIENT
Start: 2021-01-05 | End: 2021-02-04

## 2021-01-05 ASSESSMENT — ENCOUNTER SYMPTOMS
COUGH: 0
DIARRHEA: 0
ABDOMINAL PAIN: 0
WHEEZING: 0
NAUSEA: 0
RHINORRHEA: 0
SINUS PAIN: 0
BACK PAIN: 0
VOMITING: 0

## 2021-01-05 ASSESSMENT — PATIENT HEALTH QUESTIONNAIRE - PHQ9
2. FEELING DOWN, DEPRESSED OR HOPELESS: 1
SUM OF ALL RESPONSES TO PHQ9 QUESTIONS 1 & 2: 1
SUM OF ALL RESPONSES TO PHQ QUESTIONS 1-9: 1

## 2021-01-05 NOTE — PROGRESS NOTES
HPI Notes    Name: Hermann Antoine  : 1970         Chief Complaint:     Chief Complaint   Patient presents with    Leg Pain     Patient C/O bilateral leg pain and bilateral arm pain (upper). She was taking Aleve with no relief.  Arm Pain       History of Present Illness:      HPI    Is a 58-year-old intellectually disabled woman accompanied by her daughter, Figueroa Ramirez, presenting to discuss bilateral shoulder discomfort and left buttock and leg pain. Left buttock and leg pain: states she has a sharp stabbing pain in the buttocks over the past week. No radiation, No numbness, tingling, paresthesias. Sitting makes it worse, bending worsens. Relieved by not sitting on the left buttock. Occasionally will feel stiff. No remembered trauma. Bilateral shoulder pain: states her shoulders are achy. Over past week. Not preventing her from completing any ADLs or hobbies. States is slightly worse on right. She is right handed. Still has hand paresthesias from carpal tunnel but no radiculopathy. States worse with activity, dressing, overhead lifting. Relieved by rest. No remembered trauma. Has been using a muscle rub and aleve daily to no good improvement.      Past Medical History:     Past Medical History:   Diagnosis Date    Anemia     Depression     Diabetes mellitus ([facility] [address]     Hyperlipidemia     Hypertension       Reviewed all health maintenance requirements and ordered appropriate tests  Health Maintenance Due   Topic Date Due    Hepatitis C screen  1970    Cervical cancer screen  02/15/1990    Breast cancer screen  02/15/2019    Lipid screen  2020    Diabetic foot exam  2020    Diabetic microalbuminuria test  2020    Diabetic retinal exam  2020    Flu vaccine (1) 2020       Past Surgical History:     Past Surgical History:   Procedure Laterality Date    CHOLECYSTECTOMY      HYSTERECTOMY      TONSILLECTOMY          Medications: Prior to Admission medications    Medication Sig Start Date End Date Taking? Authorizing Provider   TRUEplus Lancets 28G MISC USE TO TEST BLOOD SUGAR DAILY 10/28/20  Yes Glen Skelton MD   sertraline (ZOLOFT) 100 MG tablet TAKE (1) TABLET BY MOUTH ONCE DAILY *EMERGENCY REFILL* 10/12/20  Yes Glen Skelton MD   metFORMIN (GLUCOPHAGE-XR) 500 MG extended release tablet TAKE (1) TABLET BY MOUTH ONCE DAILY WITH BREAKFAST 10/12/20  Yes Glen Skelton MD   lisinopril (PRINIVIL;ZESTRIL) 40 MG tablet TAKE 1 TABLET BY MOUTH DAILY *EMERGENCY REFILL* 10/12/20  Yes Glen Skelton MD   vitamin D (CHOLECALCIFEROL) 50 MCG (2000 UT) TABS tablet TAKE (1) TABLET BY MOUTH ONCE DAILY 10/12/20  Yes Glen Skelton MD   omeprazole (PRILOSEC) 20 MG delayed release capsule TAKE ONE (1) CAPSULE BY MOUTH ONCE DAILY 10/12/20  Yes Glen Skelton MD   loratadine (CLARITIN) 10 MG tablet TAKE (1) TABLET BY MOUTH ONCE DAILY 10/12/20  Yes Glen Skelton MD   Multiple Vitamin (MULTIVITAMIN) TABS TAKE 1 TABLET BY MOUTH DAILY 7/28/20  Yes Glen Skelton MD   simvastatin (ZOCOR) 40 MG tablet TAKE 1 TABLET BY MOUTH NIGHTLY 7/28/20  Yes Glen Skelton MD   diclofenac sodium (VOLTAREN) 1 % GEL Apply 2 g topically 4 times daily 7/28/20  Yes Tavares Agustin MD   blood glucose monitor strips Check daily. Please dispense true metrix test strips 2/6/20  Yes TOSHA Max CNP   Alcohol Swabs PADS Check blood glucose daily 2/6/20  Yes TOSHA Max CNP   blood glucose monitor kit and supplies Check daily 5/8/19  Yes TOSHA Max CNP   gabapentin (NEURONTIN) 600 MG tablet TAKE 1 TABLET BY MOUTH TWICE DAILY  Patient not taking: Reported on 1/5/2021 9/22/20 10/22/20  Marito Oliver MD        Allergies:       Seasonal    Social History:     Tobacco:    reports that she has never smoked. She has never used smokeless tobacco.  Alcohol:      reports no history of alcohol use. Drug Use:  reports no history of drug use. Family History:     No family history on file. Review of Systems:         Review of Systems   Constitutional: Negative for fever. HENT: Negative for rhinorrhea, sinus pain and sneezing. Respiratory: Negative for cough and wheezing. Cardiovascular: Negative for chest pain. Gastrointestinal: Negative for abdominal pain, diarrhea, nausea and vomiting. Musculoskeletal: Positive for myalgias. Negative for back pain. Skin: Negative for rash. Neurological: Negative for tremors, weakness, numbness and headaches. Psychiatric/Behavioral: Negative for sleep disturbance. Physical Exam:     Vitals:  /82   Pulse 86   Temp 97.5 °F (36.4 °C) (Temporal)   Wt 249 lb (112.9 kg)   LMP  (LMP Unknown)   SpO2 97%   BMI 40.19 kg/m²        Physical Exam  Vitals signs and nursing note reviewed. Constitutional:       General: She is not in acute distress. Appearance: Normal appearance. She is normal weight. Musculoskeletal: Normal range of motion. General: Tenderness present. No swelling. Comments: Tender over muscle belly in both deltoids. 5/5 strength in all planes motion bilaterally for upper and lower extremities. Patient is nearly able to bend over and touch her toes. Skin:     General: Skin is warm and dry. Capillary Refill: Capillary refill takes less than 2 seconds. Neurological:      General: No focal deficit present. Mental Status: She is alert and oriented to person, place, and time. Mental status is at baseline. Cranial Nerves: No cranial nerve deficit. Psychiatric:         Mood and Affect: Mood normal.         Behavior: Behavior normal.         Thought Content:  Thought content normal.                 Data:     Lab Results   Component Value Date     02/22/2020    K 4.5 02/22/2020     02/22/2020    CO2 25 02/22/2020    BUN 14 02/22/2020    CREATININE 0.69 02/22/2020 Patient Instructions   Based on my exam and hearing your story, it seems like you have some muscle strains. I'd keep up with the muscle rub. You can stop Aleve. If you'd like to go to a chiropractor, I think that would be a fine thing to do. A local one is Dr. Ar Bethea in [place]. His number is [phone]. His office is at [place] [address], [facility], [facility]. Dr. Eusebio Craft:    Artie Ugarte may be receiving a survey from [employer] regarding your visit today. Please complete the survey to enable us to provide the highest quality of care to you and your family. If you cannot score us a very good on any question, please call the office to discuss how we could of made your experience a very good one. Thank you. Electronically signed by Danny Nixon DO on 1/5/2021 at 11:20 AM           Completed Refills   Requested Prescriptions      No prescriptions requested or ordered in this encounter         Dwayne Castellon received counseling on the following healthy behaviors: medication adherence  Reviewed prior labs and health maintenance. Continue current medications, diet and exercise. Discussed use, benefit, and side effects of prescribed medications. Barriers to medication compliance addressed. Patient given educational materials - see patient instructions. All patient questions answered. Patient voiced understanding.

## 2021-01-05 NOTE — PATIENT INSTRUCTIONS
Based on my exam and hearing your story, it seems like you have some muscle strains. I'd keep up with the muscle rub. You can stop Aleve. If you'd like to go to a chiropractor, I think that would be a fine thing to do. A local one is Dr. Sugey Graves in [place]. His number is [phone]. His office is at [address] Dr. Joesphine Galeazzi:    Gertrude Jennings may be receiving a survey from [employer] regarding your visit today. Please complete the survey to enable us to provide the highest quality of care to you and your family. If you cannot score us a very good on any question, please call the office to discuss how we could of made your experience a very good one. Thank you.

## 2021-01-05 NOTE — TELEPHONE ENCOUNTER
Health Maintenance   Topic Date Due    Hepatitis C screen  1970    Cervical cancer screen  02/15/1990    Breast cancer screen  02/15/2019    Lipid screen  05/02/2020    Diabetic foot exam  07/22/2020    Diabetic microalbuminuria test  07/22/2020    Diabetic retinal exam  07/22/2020    Flu vaccine (1) 09/01/2020    Hepatitis B vaccine (1 of 3 - Risk 3-dose series) 07/10/2021 (Originally 2/15/1988)    DTaP/Tdap/Td vaccine (1 - Tdap) 07/10/2021 (Originally 2/15/1988)    Shingles Vaccine (1 of 2) 07/10/2021 (Originally 2/15/2019)    Pneumococcal 0-64 years Vaccine (1 of 1 - PPSV23) 07/10/2021 (Originally 2/15/1975)    HIV screen  07/10/2021 (Originally 2/15/1984)    A1C test (Diabetic or Prediabetic)  02/04/2021    Potassium monitoring  02/22/2021    Creatinine monitoring  02/22/2021    Colon Cancer Screen FIT/FOBT  02/25/2021    Hepatitis A vaccine  Aged Out    Hib vaccine  Aged Out    Meningococcal (ACWY) vaccine  Aged Out             (applicable per patient's age: Cancer Screenings, Depression Screening, Fall Risk Screening, Immunizations)    Hemoglobin A1C (%)   Date Value   02/04/2020 6.4   10/10/2019 6.1   07/22/2019 6.2     LDL Cholesterol (mg/dL)   Date Value   05/02/2019 97     AST (U/L)   Date Value   02/22/2020 31     ALT (U/L)   Date Value   02/22/2020 37 (H)     BUN (mg/dL)   Date Value   02/22/2020 14      (goal A1C is < 7)   (goal LDL is <100) need 30-50% reduction from baseline     BP Readings from Last 3 Encounters:   01/05/21 122/82   12/14/20 130/80   07/28/20 (!) 132/96    (goal /80)      All Future Testing planned in [facility]:  Lab Frequency Next Occurrence   JAYY DIGITAL SCREEN W CAD BILATERAL Once 09/09/2021   EMG Once 02/11/2021       Next Visit Date:  Future Appointments   Date Time Provider Marilyn Lundberg   3/15/2021 10:00 AM DO CANDICE Shaffer [facility]            Patient Active Problem List:     SBO (small bowel obstruction) ([facility] [address]     NOLAN (acute kidney injury) ([address]     Type 2 diabetes mellitus without complication, without long-term current use of insulin ([address]     Morbid obesity due to excess calories ([address]

## 2021-01-06 RX ORDER — METFORMIN HYDROCHLORIDE 500 MG/1
TABLET, EXTENDED RELEASE ORAL
Qty: 30 TABLET | Refills: 1 | Status: SHIPPED | OUTPATIENT
Start: 2021-01-06 | End: 2021-02-10

## 2021-01-06 RX ORDER — OMEPRAZOLE 20 MG/1
CAPSULE, DELAYED RELEASE ORAL
Qty: 30 CAPSULE | Refills: 2 | Status: SHIPPED | OUTPATIENT
Start: 2021-01-06

## 2021-01-21 RX ORDER — LORATADINE 10 MG/1
TABLET ORAL
Qty: 30 TABLET | Refills: 2 | OUTPATIENT
Start: 2021-01-21

## 2021-01-21 RX ORDER — CHOLECALCIFEROL (VITAMIN D3) 50 MCG
TABLET ORAL
Qty: 30 TABLET | Refills: 1 | OUTPATIENT
Start: 2021-01-21

## 2021-01-21 RX ORDER — GABAPENTIN 600 MG/1
TABLET ORAL
Qty: 60 TABLET | Refills: 3 | OUTPATIENT
Start: 2021-01-21 | End: 2021-02-20

## 2021-01-21 RX ORDER — PRAMIPEXOLE DIHYDROCHLORIDE 1 MG/1
TABLET ORAL
Qty: 90 TABLET | Refills: 1 | OUTPATIENT
Start: 2021-01-21

## 2021-01-21 RX ORDER — SIMVASTATIN 40 MG
TABLET ORAL
Qty: 30 TABLET | Refills: 1 | OUTPATIENT
Start: 2021-01-21

## 2021-01-21 RX ORDER — MULTIVITAMIN
TABLET ORAL
Qty: 30 TABLET | Refills: 1 | OUTPATIENT
Start: 2021-01-21

## 2021-01-27 ENCOUNTER — TELEPHONE (OUTPATIENT)
Dept: FAMILY MEDICINE CLINIC | Age: 51
End: 2021-01-27

## 2021-01-27 NOTE — TELEPHONE ENCOUNTER
Not aware on why Angie Kessler would need a letter from me. If she is the payee through job and family services or disability, those finances are and have always been under her control. Any control others have would be through her explicit allowance. Social Security to the best of my knowledge cannot simply take someone's control over their finances away because an outside party calls and tells them that it is suspect. If she does need formal testing, this is not something I do, I can refer her to neuropsychology. Lysbeth Carrel

## 2021-01-27 NOTE — TELEPHONE ENCOUNTER
Patient called stating she needs a letter for Social Security that she is capable of handling her finances by herself. Daughter and her got into it and she moved out and daughter called Social security telling them she can't handle it . Please advise .

## 2021-01-27 NOTE — TELEPHONE ENCOUNTER
Called the number the patient left and it was her daughters phone number. She wasn't available to talk. I told her to have patient give us a call.

## 2021-02-02 NOTE — TELEPHONE ENCOUNTER
Health Maintenance   Topic Date Due    Hepatitis C screen  1970    Cervical cancer screen  02/15/1990    Breast cancer screen  02/15/2019    Lipid screen  05/02/2020    Diabetic foot exam  07/22/2020    Diabetic microalbuminuria test  07/22/2020    Diabetic retinal exam  07/22/2020    Flu vaccine (1) 09/01/2020    A1C test (Diabetic or Prediabetic)  02/04/2021    Colon Cancer Screen FIT/FOBT  02/25/2021    Hepatitis B vaccine (1 of 3 - Risk 3-dose series) 07/10/2021 (Originally 2/15/1988)    DTaP/Tdap/Td vaccine (1 - Tdap) 07/10/2021 (Originally 2/15/1988)    Shingles Vaccine (1 of 2) 07/10/2021 (Originally 2/15/2019)    Pneumococcal 0-64 years Vaccine (1 of 1 - PPSV23) 07/10/2021 (Originally 2/15/1975)    HIV screen  07/10/2021 (Originally 2/15/1984)    Potassium monitoring  02/22/2021    Creatinine monitoring  02/22/2021    Hepatitis A vaccine  Aged Out    Hib vaccine  Aged Out    Meningococcal (ACWY) vaccine  Aged Out             (applicable per patient's age: Cancer Screenings, Depression Screening, Fall Risk Screening, Immunizations)    Hemoglobin A1C (%)   Date Value   02/04/2020 6.4   10/10/2019 6.1   07/22/2019 6.2     LDL Cholesterol (mg/dL)   Date Value   05/02/2019 97     AST (U/L)   Date Value   02/22/2020 31     ALT (U/L)   Date Value   02/22/2020 37 (H)     BUN (mg/dL)   Date Value   02/22/2020 14      (goal A1C is < 7)   (goal LDL is <100) need 30-50% reduction from baseline     BP Readings from Last 3 Encounters:   01/05/21 122/82   12/14/20 130/80   07/28/20 (!) 132/96    (goal /80)      All Future Testing planned in [facility]:  Lab Frequency Next Occurrence   JAYY DIGITAL SCREEN W CAD BILATERAL Once 09/09/2021   EMG Once 02/11/2021       Next Visit Date:  Future Appointments   Date Time Provider Marilyn Lundberg   3/15/2021 10:00 AM DO CANDICE Wick [facility]            Patient Active Problem List:     SBO (small bowel obstruction) ([facility] [address]     NOLAN (acute kidney injury) ([address]     Type 2 diabetes mellitus without complication, without long-term current use of insulin ([address]     Morbid obesity due to excess calories ([address]

## 2021-02-03 RX ORDER — MULTIVITAMIN
TABLET ORAL
Qty: 90 TABLET | Refills: 1 | Status: SHIPPED | OUTPATIENT
Start: 2021-02-03

## 2021-02-03 RX ORDER — SIMVASTATIN 40 MG
TABLET ORAL
Qty: 90 TABLET | Refills: 1 | Status: SHIPPED | OUTPATIENT
Start: 2021-02-03

## 2021-02-03 RX ORDER — LORATADINE 10 MG/1
TABLET ORAL
Qty: 90 TABLET | Refills: 1 | Status: SHIPPED | OUTPATIENT
Start: 2021-02-03

## 2021-02-10 RX ORDER — METFORMIN HYDROCHLORIDE 500 MG/1
TABLET, EXTENDED RELEASE ORAL
Qty: 30 TABLET | Refills: 11 | Status: SHIPPED | OUTPATIENT
Start: 2021-02-10

## 2021-02-10 NOTE — TELEPHONE ENCOUNTER
A1C (%)   Date Value   02/04/2020 6.4   10/10/2019 6.1   07/22/2019 6.2             ( goal A1C is < 7)   No results found for: LABMICR  LDL Cholesterol (mg/dL)   Date Value   05/02/2019 97       (goal LDL is <100)   AST (U/L)   Date Value   02/22/2020 31     ALT (U/L)   Date Value   02/22/2020 37 (H)     BUN (mg/dL)   Date Value   02/22/2020 14     BP Readings from Last 3 Encounters:   01/05/21 122/82   12/14/20 130/80   07/28/20 (!) 132/96          (goal 120/80)

## 2021-02-19 RX ORDER — GABAPENTIN 600 MG/1
TABLET ORAL
Qty: 60 TABLET | Refills: 10 | OUTPATIENT
Start: 2021-02-19 | End: 2021-03-21

## 2021-03-01 DIAGNOSIS — E11.8 TYPE 2 DIABETES MELLITUS WITH COMPLICATION, WITHOUT LONG-TERM CURRENT USE OF INSULIN (HCC): Primary | ICD-10-CM

## 2021-03-01 RX ORDER — METFORMIN HYDROCHLORIDE 500 MG/1
TABLET, EXTENDED RELEASE ORAL
Qty: 30 TABLET | Refills: 11 | Status: CANCELLED | OUTPATIENT
Start: 2021-03-01

## 2021-03-01 RX ORDER — LORATADINE 10 MG/1
TABLET ORAL
Qty: 90 TABLET | Refills: 1 | Status: CANCELLED | OUTPATIENT
Start: 2021-03-01

## 2021-03-01 RX ORDER — GABAPENTIN 600 MG/1
TABLET ORAL
Qty: 60 TABLET | Refills: 3 | Status: CANCELLED | OUTPATIENT
Start: 2021-03-01

## 2021-03-01 RX ORDER — SERTRALINE HYDROCHLORIDE 100 MG/1
TABLET, FILM COATED ORAL
Qty: 30 TABLET | Refills: 0 | Status: CANCELLED | OUTPATIENT
Start: 2021-03-01

## 2021-03-01 RX ORDER — LISINOPRIL 40 MG/1
TABLET ORAL
Qty: 30 TABLET | Refills: 2 | Status: CANCELLED | OUTPATIENT
Start: 2021-03-01

## 2021-03-01 RX ORDER — MULTIVITAMIN
TABLET ORAL
Qty: 90 TABLET | Refills: 1 | Status: CANCELLED | OUTPATIENT
Start: 2021-03-01

## 2021-03-01 RX ORDER — CHOLECALCIFEROL (VITAMIN D3) 50 MCG
TABLET ORAL
Qty: 30 TABLET | Refills: 1 | Status: CANCELLED | OUTPATIENT
Start: 2021-03-01

## 2021-03-01 RX ORDER — SIMVASTATIN 40 MG
TABLET ORAL
Qty: 90 TABLET | Refills: 1 | Status: CANCELLED | OUTPATIENT
Start: 2021-03-01

## 2021-03-01 RX ORDER — OMEPRAZOLE 20 MG/1
CAPSULE, DELAYED RELEASE ORAL
Qty: 30 CAPSULE | Refills: 2 | Status: CANCELLED | OUTPATIENT
Start: 2021-03-01

## 2021-03-01 NOTE — TELEPHONE ENCOUNTER
Lisinopril 40 mg    Mail order to [address] Maintenance   Topic Date Due    Hepatitis C screen  1970    Cervical cancer screen  02/15/1990    Breast cancer screen  02/15/2019    Lipid screen  05/02/2020    Diabetic foot exam  07/22/2020    Diabetic microalbuminuria test  07/22/2020    Diabetic retinal exam  07/22/2020    Flu vaccine (1) 09/01/2020    A1C test (Diabetic or Prediabetic)  02/04/2021    Potassium monitoring  02/22/2021    Creatinine monitoring  02/22/2021    Colon Cancer Screen FIT/FOBT  02/25/2021    Hepatitis B vaccine (1 of 3 - Risk 3-dose series) 07/10/2021 (Originally 2/15/1988)    DTaP/Tdap/Td vaccine (1 - Tdap) 07/10/2021 (Originally 2/15/1988)    Shingles Vaccine (1 of 2) 07/10/2021 (Originally 2/15/2019)    Pneumococcal 0-64 years Vaccine (1 of 1 - PPSV23) 07/10/2021 (Originally 2/15/1975)    HIV screen  07/10/2021 (Originally 2/15/1984)    Hepatitis A vaccine  Aged Out    Hib vaccine  Aged Out    Meningococcal (ACWY) vaccine  Aged Out             (applicable per patient's age: Cancer Screenings, Depression Screening, Fall Risk Screening, Immunizations)    Hemoglobin A1C (%)   Date Value   02/04/2020 6.4   10/10/2019 6.1   07/22/2019 6.2     LDL Cholesterol (mg/dL)   Date Value   05/02/2019 97     AST (U/L)   Date Value   02/22/2020 31     ALT (U/L)   Date Value   02/22/2020 37 (H)     BUN (mg/dL)   Date Value   02/22/2020 14      (goal A1C is < 7)   (goal LDL is <100) need 30-50% reduction from baseline     BP Readings from Last 3 Encounters:   01/05/21 122/82   12/14/20 130/80   07/28/20 (!) 132/96    (goal /80)      All Future Testing planned in [facility]:  Lab Frequency Next Occurrence   JAYY DIGITAL SCREEN W CAD BILATERAL Once 09/09/2021   EMG Once 12/23/2022       Next Visit Date:  Future Appointments   Date Time Provider Marilyn Lundberg   3/15/2021 10:00 AM DO Alma Ulrichina [facility]            Patient Active Problem List: SBO (small bowel obstruction) ([facility])     NOLAN (acute kidney injury) ([address]     Type 2 diabetes mellitus without complication, without long-term current use of insulin ([address]     Morbid obesity due to excess calories ([address]

## 2021-04-28 RX ORDER — SERTRALINE HYDROCHLORIDE 100 MG/1
TABLET, FILM COATED ORAL
Qty: 30 TABLET | Refills: 0 | OUTPATIENT
Start: 2021-04-28

## 2021-04-28 NOTE — TELEPHONE ENCOUNTER
Patient was instructed on 3/1/21 to get labs drawn and schedule an appt. Didn't do either    Last visit:  1/5/2021  Next Visit Date:  No future appointments. Medication List:  Prior to Admission medications    Medication Sig Start Date End Date Taking? Authorizing Provider   metFORMIN (GLUCOPHAGE-XR) 500 MG extended release tablet TAKE 1 TABLET BY MOUTH EVERY MORNING WITH BREAKFAST 2/10/21   Johana Dickey,    loratadine (CLARITIN) 10 MG tablet TAKE (1) TABLET BY MOUTH ONCE DAILY 2/3/21   Johana Dickey DO   Multiple Vitamin (MULTIVITAMIN) TABS TAKE 1 TABLET BY MOUTH DAILY 2/3/21   Johana Dickey DO   simvastatin (ZOCOR) 40 MG tablet TAKE 1 TABLET BY MOUTH NIGHTLY 2/3/21   Johana Dickey DO   omeprazole (PRILOSEC) 20 MG delayed release capsule TAKE ONE (1) CAPSULE BY MOUTH ONCE DAILY 1/6/21   Johana Dickey DO   TRUEplus Lancets 28G MISC USE TO TEST BLOOD SUGAR DAILY 10/28/20   Tez Rouse MD   sertraline (ZOLOFT) 100 MG tablet TAKE (1) TABLET BY MOUTH ONCE DAILY *EMERGENCY REFILL* 10/12/20   Tez Rouse MD   lisinopril (PRINIVIL;ZESTRIL) 40 MG tablet TAKE 1 TABLET BY MOUTH DAILY *EMERGENCY REFILL* 10/12/20   Tez Rouse MD   vitamin D (CHOLECALCIFEROL) 50 MCG (2000 UT) TABS tablet TAKE (1) TABLET BY MOUTH ONCE DAILY 10/12/20   Tez Rouse MD   gabapentin (NEURONTIN) 600 MG tablet TAKE 1 TABLET BY MOUTH TWICE DAILY  Patient not taking: Reported on 1/5/2021 9/22/20 10/22/20  Aruna Lao MD   diclofenac sodium (VOLTAREN) 1 % GEL Apply 2 g topically 4 times daily 7/28/20   Alix Buitrago MD   blood glucose monitor strips Check daily.  Please dispense true metrix test strips 2/6/20   TOSHA Ann CNP   Alcohol Swabs PADS Check blood glucose daily 2/6/20   TOSHA Ann CNP   blood glucose monitor kit and supplies Check daily 5/8/19   TOSHA Ann CNP

## 2021-07-12 ENCOUNTER — HOSPITAL ENCOUNTER (OUTPATIENT)
Age: 51
Discharge: HOME | End: 2021-07-14
Payer: COMMERCIAL

## 2021-07-12 ENCOUNTER — HOSPITAL ENCOUNTER (OUTPATIENT)
Dept: GENERAL RADIOLOGY | Age: 51
Discharge: HOME | End: 2021-07-14
Payer: COMMERCIAL

## 2021-07-12 DIAGNOSIS — M54.50 LUMBAR PAIN: ICD-10-CM

## 2021-07-12 DIAGNOSIS — M25.512 LEFT SHOULDER PAIN, UNSPECIFIED CHRONICITY: ICD-10-CM

## 2021-07-12 DIAGNOSIS — M53.3 TENDERNESS OF SACROILIAC JOINT: ICD-10-CM

## 2021-07-12 PROCEDURE — 72110 X-RAY EXAM L-2 SPINE 4/>VWS: CPT

## 2021-07-12 PROCEDURE — 72220 X-RAY EXAM SACRUM TAILBONE: CPT

## 2021-07-12 PROCEDURE — 73030 X-RAY EXAM OF SHOULDER: CPT

## 2022-01-19 ENCOUNTER — HOSPITAL ENCOUNTER (OUTPATIENT)
Age: 52
Discharge: HOME | End: 2022-01-21
Payer: COMMERCIAL

## 2022-01-19 ENCOUNTER — HOSPITAL ENCOUNTER (OUTPATIENT)
Dept: GENERAL RADIOLOGY | Age: 52
Discharge: HOME | End: 2022-01-21
Payer: COMMERCIAL

## 2022-01-19 DIAGNOSIS — S69.92XA INJURY OF FINGER OF LEFT HAND, INITIAL ENCOUNTER: ICD-10-CM

## 2022-01-19 PROCEDURE — 73140 X-RAY EXAM OF FINGER(S): CPT

## 2022-04-12 ENCOUNTER — HOSPITAL ENCOUNTER (OUTPATIENT)
Age: 52
Setting detail: SPECIMEN
Discharge: HOME | End: 2022-04-12
Payer: COMMERCIAL

## 2022-04-12 ENCOUNTER — OFFICE VISIT (OUTPATIENT)
Dept: FAMILY MEDICINE CLINIC | Age: 52
End: 2022-04-12
Payer: COMMERCIAL

## 2022-04-12 VITALS
TEMPERATURE: 98.4 F | WEIGHT: 266 LBS | OXYGEN SATURATION: 96 % | DIASTOLIC BLOOD PRESSURE: 86 MMHG | HEART RATE: 90 BPM | BODY MASS INDEX: 42.93 KG/M2 | SYSTOLIC BLOOD PRESSURE: 128 MMHG

## 2022-04-12 DIAGNOSIS — L91.8 SKIN TAG: Primary | ICD-10-CM

## 2022-04-12 DIAGNOSIS — Z12.31 BREAST CANCER SCREENING BY MAMMOGRAM: ICD-10-CM

## 2022-04-12 PROBLEM — K56.609 SBO (SMALL BOWEL OBSTRUCTION) (HCC): Status: RESOLVED | Noted: 2020-01-20 | Resolved: 2022-04-12

## 2022-04-12 PROBLEM — G47.30 SLEEP APNEA: Status: ACTIVE | Noted: 2021-11-23

## 2022-04-12 PROBLEM — N17.9 AKI (ACUTE KIDNEY INJURY) (HCC): Status: RESOLVED | Noted: 2020-01-20 | Resolved: 2022-04-12

## 2022-04-12 PROBLEM — I10 HYPERTENSION: Status: ACTIVE | Noted: 2022-04-12

## 2022-04-12 PROBLEM — E11.9 DIABETES MELLITUS (HCC): Status: RESOLVED | Noted: 2022-04-12 | Resolved: 2022-04-12

## 2022-04-12 PROBLEM — E11.9 DIABETES MELLITUS (HCC): Status: ACTIVE | Noted: 2022-04-12

## 2022-04-12 PROBLEM — G25.81 RESTLESS LEGS SYNDROME: Status: ACTIVE | Noted: 2022-04-12

## 2022-04-12 PROCEDURE — 1036F TOBACCO NON-USER: CPT | Performed by: NURSE PRACTITIONER

## 2022-04-12 PROCEDURE — G8427 DOCREV CUR MEDS BY ELIG CLIN: HCPCS | Performed by: NURSE PRACTITIONER

## 2022-04-12 PROCEDURE — 3017F COLORECTAL CA SCREEN DOC REV: CPT | Performed by: NURSE PRACTITIONER

## 2022-04-12 PROCEDURE — 11200 RMVL SKIN TAGS UP TO&INC 15: CPT | Performed by: NURSE PRACTITIONER

## 2022-04-12 PROCEDURE — 88304 TISSUE EXAM BY PATHOLOGIST: CPT

## 2022-04-12 PROCEDURE — G8417 CALC BMI ABV UP PARAM F/U: HCPCS | Performed by: NURSE PRACTITIONER

## 2022-04-12 PROCEDURE — 99213 OFFICE O/P EST LOW 20 MIN: CPT | Performed by: NURSE PRACTITIONER

## 2022-04-12 RX ORDER — CYCLOBENZAPRINE HCL 10 MG
10 TABLET ORAL NIGHTLY
COMMUNITY

## 2022-04-12 RX ORDER — MELOXICAM 15 MG/1
15 TABLET ORAL DAILY
COMMUNITY

## 2022-04-12 RX ORDER — ROPINIROLE 1 MG/1
4 TABLET, FILM COATED ORAL NIGHTLY
COMMUNITY
End: 2022-05-27 | Stop reason: ALTCHOICE

## 2022-04-12 SDOH — ECONOMIC STABILITY: FOOD INSECURITY: WITHIN THE PAST 12 MONTHS, YOU WORRIED THAT YOUR FOOD WOULD RUN OUT BEFORE YOU GOT MONEY TO BUY MORE.: NEVER TRUE

## 2022-04-12 SDOH — ECONOMIC STABILITY: FOOD INSECURITY: WITHIN THE PAST 12 MONTHS, THE FOOD YOU BOUGHT JUST DIDN'T LAST AND YOU DIDN'T HAVE MONEY TO GET MORE.: NEVER TRUE

## 2022-04-12 ASSESSMENT — ENCOUNTER SYMPTOMS
SHORTNESS OF BREATH: 0
DIARRHEA: 0
COUGH: 0
NAUSEA: 0
VOMITING: 0

## 2022-04-12 ASSESSMENT — SOCIAL DETERMINANTS OF HEALTH (SDOH): HOW HARD IS IT FOR YOU TO PAY FOR THE VERY BASICS LIKE FOOD, HOUSING, MEDICAL CARE, AND HEATING?: NOT HARD AT ALL

## 2022-04-12 ASSESSMENT — PATIENT HEALTH QUESTIONNAIRE - PHQ9
SUM OF ALL RESPONSES TO PHQ9 QUESTIONS 1 & 2: 2
SUM OF ALL RESPONSES TO PHQ QUESTIONS 1-9: 2
SUM OF ALL RESPONSES TO PHQ QUESTIONS 1-9: 2
2. FEELING DOWN, DEPRESSED OR HOPELESS: 1
SUM OF ALL RESPONSES TO PHQ QUESTIONS 1-9: 2
1. LITTLE INTEREST OR PLEASURE IN DOING THINGS: 1
SUM OF ALL RESPONSES TO PHQ QUESTIONS 1-9: 2

## 2022-04-12 NOTE — PROGRESS NOTES
HPI Notes    Name: Ban Pulse  : 1970         Chief Complaint:     Chief Complaint   Patient presents with    Skin Tag     Patient here today for skin tag on neck. History of Present Illness:        HPI  Pt is a 48 yo female who reports for painful lesion to the left neck. Lesion has been present for many years and has gotten larger and more painful over time. Past Medical History:     Past Medical History:   Diagnosis Date    Anemia     Depression     Diabetes mellitus ([facility] [address]     Hyperlipidemia     Hypertension       Reviewed all health maintenance requirements and ordered appropriate tests  Health Maintenance Due   Topic Date Due    Hepatitis C screen  Never done    COVID-19 Vaccine (1) Never done    Pneumococcal 0-64 years Vaccine (1 of 2 - PPSV23) Never done    HIV screen  Never done    Hepatitis B vaccine (1 of 3 - Risk 3-dose series) Never done    DTaP/Tdap/Td vaccine (1 - Tdap) Never done    Breast cancer screen  Never done    Shingles Vaccine (1 of 2) Never done    Lipid screen  2020    Diabetic foot exam  2020    Diabetic microalbuminuria test  2020    Diabetic retinal exam  2020    A1C test (Diabetic or Prediabetic)  2021    Potassium monitoring  2021    Creatinine monitoring  2021    Colorectal Cancer Screen  2021    Depression Screen  2022       Past Surgical History:     Past Surgical History:   Procedure Laterality Date    CHOLECYSTECTOMY      HYSTERECTOMY      TONSILLECTOMY          Medications:       Prior to Admission medications    Medication Sig Start Date End Date Taking?  Authorizing Provider   rOPINIRole (REQUIP) 1 MG tablet Take 1 mg by mouth nightly   Yes Historical Provider, MD   meloxicam (MOBIC) 15 MG tablet Take 15 mg by mouth daily   Yes Historical Provider, MD   cyclobenzaprine (FLEXERIL) 10 MG tablet Take 10 mg by mouth nightly   Yes Historical Provider, MD   [facility]MIN ([facility]) 500 MG extended release tablet TAKE 1 TABLET BY MOUTH EVERY MORNING WITH BREAKFAST 2/10/21  Yes Anuj Dickey DO   loratadine (CLARITIN) 10 MG tablet TAKE (1) TABLET BY MOUTH ONCE DAILY 2/3/21  Yes Anuj Dickey DO   Multiple Vitamin (MULTIVITAMIN) TABS TAKE 1 TABLET BY MOUTH DAILY 2/3/21  Yes Anuj Dickey DO   simvastatin (ZOCOR) 40 MG tablet TAKE 1 TABLET BY MOUTH NIGHTLY 2/3/21  Yes Anuj Dickey DO   omeprazole (PRILOSEC) 20 MG delayed release capsule TAKE ONE (1) CAPSULE BY MOUTH ONCE DAILY 1/6/21  Yes Anuj Dickey DO   TRUEplus Lancets 28G MISC USE TO TEST BLOOD SUGAR DAILY 10/28/20  Yes Sveta Heredia MD   sertraline (ZOLOFT) 100 MG tablet TAKE (1) TABLET BY MOUTH ONCE DAILY *EMERGENCY REFILL* 10/12/20  Yes Sveta Heredia MD   lisinopril (PRINIVIL;ZESTRIL) 40 MG tablet TAKE 1 TABLET BY MOUTH DAILY *EMERGENCY REFILL* 10/12/20  Yes Sveta Heredia MD   blood glucose monitor strips Check daily. Please dispense true metrix test strips 2/6/20  Yes TOSHA Ziegler CNP   Alcohol Swabs PADS Check blood glucose daily 2/6/20  Yes TOSHA Ziegler CNP   blood glucose monitor kit and supplies Check daily 5/8/19  Yes TOSHA Ziegler CNP   gabapentin (NEURONTIN) 600 MG tablet TAKE 1 TABLET BY MOUTH TWICE DAILY  Patient not taking: Reported on 1/5/2021 9/22/20 10/22/20  Marylee Junker, MD        Allergies:       Seasonal    Social History:     Tobacco:    reports that she has never smoked. She has never used smokeless tobacco.  Alcohol:      reports no history of alcohol use. Drug Use:  reports no history of drug use. Family History:     No family history on file. Review of Systems:         Review of Systems   Constitutional: Negative for chills and fever. Respiratory: Negative for cough and shortness of breath. Cardiovascular: Negative for chest pain and palpitations. Gastrointestinal: Negative for diarrhea, nausea and vomiting.    Skin: Positive for wound (skin tag left neck). Neurological: Negative for dizziness, seizures and headaches. Physical Exam:     Vitals:  /86   Pulse 90   Temp 98.4 °F (36.9 °C) (Oral)   Wt 266 lb (120.7 kg)   LMP  (LMP Unknown)   SpO2 96%   BMI 42.93 kg/m²       Physical Exam  Vitals and nursing note reviewed. Constitutional:       Appearance: Normal appearance. She is well-developed. Neck:        Comments: Round, lobulated brown and flesh-colored skin tag with small stalk  Cardiovascular:      Rate and Rhythm: Normal rate and regular rhythm. Heart sounds: Normal heart sounds, S1 normal and S2 normal.   Pulmonary:      Effort: Pulmonary effort is normal. No respiratory distress. Breath sounds: Normal breath sounds. Abdominal:      General: Bowel sounds are normal.      Palpations: Abdomen is soft. Tenderness: There is no abdominal tenderness. Skin:     General: Skin is warm and dry. Neurological:      Mental Status: She is alert and oriented to person, place, and time. Data:     Lab Results   Component Value Date     02/22/2020    K 4.5 02/22/2020     02/22/2020    CO2 25 02/22/2020    BUN 14 02/22/2020    CREATININE 0.69 02/22/2020    GLUCOSE 125 02/22/2020    PROT 7.3 02/22/2020    LABALBU 4.3 02/22/2020    BILITOT 0.15 02/22/2020    ALKPHOS 114 02/22/2020    AST 31 02/22/2020    ALT 37 02/22/2020     Lab Results   Component Value Date    WBC 7.3 02/22/2020    RBC 5.17 02/22/2020    HGB 12.9 02/22/2020    HCT 41.6 02/22/2020    MCV 80.5 02/22/2020    MCH 25.0 02/22/2020    MCHC 31.0 02/22/2020    RDW 15.3 02/22/2020     02/22/2020    MPV NOT REPORTED 02/22/2020     Lab Results   Component Value Date    TSH 1.00 01/19/2020     Lab Results   Component Value Date    CHOL 168 05/02/2019    HDL 44 05/02/2019    LABA1C 6.4 02/04/2020          Assessment & Plan        Diagnosis Orders   1. Skin tag     2.  Breast cancer screening by mammogram [facility] DIGITAL SCREEN BILATERAL     Surgical consent signed. Time-out procedure conducted. Area to left neck cleansed with betadine with the center cleared with alcohol. Ethyl Chloride applied to site. Skin tag grabbed with forceps and pulled away from body to reveal stalk. Stalk cut with sharp scissors without difficulty. Pressure applied to site to stop bleeding. Silver nitrate stick applied to stop bleeding. Bleeding stops after 2 minutes. Area coated with triple antibiotic ointment and covered with band aide. Pt educated about s/s of infection. Leave band aide on until tomorrow morning. Completed Refills   Requested Prescriptions      No prescriptions requested or ordered in this encounter     No follow-ups on file. No orders of the defined types were placed in this encounter. Orders Placed This Encounter   Procedures    [facility]IG[facility] S[facility] BILATERAL     Standing Status:   Future     Standing Expiration Date:   6/12/2023     Order Specific Question:   Reason for exam:     Answer:   screening         There are no Patient Instructions on file for this visit.     Electronically signed by TOSHA Irving CNP on 4/12/2022 at 11:22 AM           Completed Refills   Requested Prescriptions      No prescriptions requested or ordered in this encounter

## 2022-04-13 ENCOUNTER — TELEPHONE (OUTPATIENT)
Dept: FAMILY MEDICINE CLINIC | Age: 52
End: 2022-04-13

## 2022-04-13 NOTE — TELEPHONE ENCOUNTER
----- Message from Nicole Dickson sent at 4/13/2022  1:37 PM EDT -----  Subject: Message to Provider    QUESTIONS  Information for Provider? pt has questions about blood work orders and   only would like to speak to with Luis Carrion at this time please call pt on 4/14   in morning if possible. If voicemail does not work-she will call back if   she misses a call from office. ---------------------------------------------------------------------------  --------------  Ranulfo AZEVEDO  What is the best way for the office to contact you? OK to leave message on   voicemail,Do not leave any message, patient will call back for answer  Preferred Call Back Phone Number? [phone]  ---------------------------------------------------------------------------  --------------  SCRIPT ANSWERS  Relationship to Patient?  Self

## 2022-04-14 NOTE — TELEPHONE ENCOUNTER
Spoke with patient and she is going to bring lab orders that was ordered per BARRY Lincoln and he did order PT for her shoulder. She is going to wait until she sees Mikael on 4/20/22 and discuss what he wants her to do.

## 2022-04-15 LAB — DERMATOLOGY PATHOLOGY REPORT: NORMAL

## 2022-04-18 ENCOUNTER — HOSPITAL ENCOUNTER (EMERGENCY)
Age: 52
Discharge: HOME | End: 2022-04-18
Attending: EMERGENCY MEDICINE
Payer: COMMERCIAL

## 2022-04-18 VITALS
SYSTOLIC BLOOD PRESSURE: 174 MMHG | TEMPERATURE: 98.4 F | HEART RATE: 108 BPM | DIASTOLIC BLOOD PRESSURE: 111 MMHG | RESPIRATION RATE: 16 BRPM | OXYGEN SATURATION: 97 %

## 2022-04-18 DIAGNOSIS — M54.2 NECK PAIN: Primary | ICD-10-CM

## 2022-04-18 PROCEDURE — 99284 EMERGENCY DEPT VISIT MOD MDM: CPT

## 2022-04-18 NOTE — ED PROVIDER NOTES
HPI:  4/18/22,   Time: 2:03 PM EDT         Ban Cisneros is a 47 y.o. female presenting to the ED for states her neck hurts after she had skin tag removed 4 days ago and now the pain has been for a few days and feels like a pressure, beginning last few days ago. The complaint has been constant, moderate in severity, and worsened by nothing. No chest pain or shortness of breath or cough no other complaints    ROS:   Pertinent positives and negatives are stated within HPI, all other systems reviewed and are negative.  --------------------------------------------- PAST HISTORY ---------------------------------------------  Past Medical History:  has a past medical history of Anemia, Depression, Diabetes mellitus ([facility] [address] Hyperlipidemia, and Hypertension. Past Surgical History:  has a past surgical history that includes Hysterectomy; Cholecystectomy; and Tonsillectomy. Social History:  reports that she has never smoked. She has never used smokeless tobacco. She reports that she does not drink alcohol and does not use drugs. Family History: family history is not on file. The patients home medications have been reviewed. Allergies: Seasonal    -------------------------------------------------- RESULTS -------------------------------------------------  All laboratory and radiology results have been personally reviewed by myself   LABS:  No results found for this visit on 04/18/22. RADIOLOGY:  Interpreted by Radiologist.  No orders to display       ------------------------- NURSING NOTES AND VITALS REVIEWED ---------------------------   The nursing notes within the ED encounter and vital signs as below have been reviewed.    BP (!) 174/111   Pulse 108   Temp 98.4 °F (36.9 °C)   Resp 16   LMP  (LMP Unknown)   SpO2 97%   Oxygen Saturation Interpretation: Normal      ---------------------------------------------------PHYSICAL EXAM--------------------------------------      Constitutional/General: Alert and oriented x3, well appearing, non toxic in NAD  Head: NC/AT  Eyes: PERRL, EOMI  Mouth: Oropharynx clear, handling secretions, no trismus  Neck: Supple, full ROM, no meningeal signs, slight tenderness to palpation over the site of skin tag removal but no swelling or deformity or evidence of infection or erythema  Pulmonary: Lungs clear to auscultation bilaterally, no wheezes, rales, or rhonchi. Not in respiratory distress  Cardiovascular:  Regular rate and rhythm, no murmurs, gallops, or rubs. 2+ distal pulses  Abdomen: Soft, non tender, non distended,   Extremities: Moves all extremities x 4. Warm and well perfused  Skin: warm and dry without rash  Neurologic: GCS 15,  Psych: Normal Affect      ------------------------------ ED COURSE/MEDICAL DECISION MAKING----------------------  Medications - No data to display      Medical Decision Making:    Neck pain status post skin tag removal but no swelling noted or evidence of infection therefore patient will follow up with her doctor in 2 days as scheduled    Counseling: The emergency provider has spoken with the patient and discussed todays results, in addition to providing specific details for the plan of care and counseling regarding the diagnosis and prognosis. Questions are answered at this time and they are agreeable with the plan.      --------------------------------- IMPRESSION AND DISPOSITION ---------------------------------    IMPRESSION  1.  Neck pain Stable       DISPOSITION  Disposition: Discharge to home  Patient condition is stable                  Viv Morris MD  04/18/22 [id number]

## 2022-04-20 ENCOUNTER — TELEPHONE (OUTPATIENT)
Dept: FAMILY MEDICINE CLINIC | Age: 52
End: 2022-04-20

## 2022-04-20 NOTE — TELEPHONE ENCOUNTER
----- Message from Josesito Reese sent at 4/20/2022  1:22 PM EDT -----  Subject: Message to Provider    QUESTIONS  Information for Provider? Pt needs a call back regarding apt. requested to   speak with the office. ---------------------------------------------------------------------------  --------------  Saeid Blocker INFO  What is the best way for the office to contact you? OK to leave message on   voicemail  Preferred Call Back Phone Number? [phone]  ---------------------------------------------------------------------------  --------------  SCRIPT ANSWERS  Relationship to Patient?  Self

## 2022-04-26 ENCOUNTER — OFFICE VISIT (OUTPATIENT)
Dept: FAMILY MEDICINE CLINIC | Age: 52
End: 2022-04-26
Payer: COMMERCIAL

## 2022-04-26 VITALS
DIASTOLIC BLOOD PRESSURE: 110 MMHG | WEIGHT: 266 LBS | BODY MASS INDEX: 45.41 KG/M2 | HEIGHT: 64 IN | OXYGEN SATURATION: 97 % | HEART RATE: 96 BPM | SYSTOLIC BLOOD PRESSURE: 168 MMHG

## 2022-04-26 DIAGNOSIS — I10 PRIMARY HYPERTENSION: ICD-10-CM

## 2022-04-26 DIAGNOSIS — E78.2 MIXED HYPERLIPIDEMIA: ICD-10-CM

## 2022-04-26 DIAGNOSIS — M67.912 DYSFUNCTION OF LEFT ROTATOR CUFF: ICD-10-CM

## 2022-04-26 DIAGNOSIS — E11.9 TYPE 2 DIABETES MELLITUS WITHOUT COMPLICATION, WITHOUT LONG-TERM CURRENT USE OF INSULIN (HCC): Primary | ICD-10-CM

## 2022-04-26 DIAGNOSIS — G25.81 RESTLESS LEGS SYNDROME: ICD-10-CM

## 2022-04-26 DIAGNOSIS — K21.9 GASTROESOPHAGEAL REFLUX DISEASE WITHOUT ESOPHAGITIS: ICD-10-CM

## 2022-04-26 LAB
CREATININE URINE POCT: ABNORMAL
HBA1C MFR BLD: 6.4 %
MICROALBUMIN/CREAT 24H UR: ABNORMAL MG/G{CREAT}
MICROALBUMIN/CREAT UR-RTO: ABNORMAL

## 2022-04-26 PROCEDURE — G8427 DOCREV CUR MEDS BY ELIG CLIN: HCPCS | Performed by: NURSE PRACTITIONER

## 2022-04-26 PROCEDURE — 1036F TOBACCO NON-USER: CPT | Performed by: NURSE PRACTITIONER

## 2022-04-26 PROCEDURE — G8417 CALC BMI ABV UP PARAM F/U: HCPCS | Performed by: NURSE PRACTITIONER

## 2022-04-26 PROCEDURE — 3017F COLORECTAL CA SCREEN DOC REV: CPT | Performed by: NURSE PRACTITIONER

## 2022-04-26 PROCEDURE — 82044 UR ALBUMIN SEMIQUANTITATIVE: CPT | Performed by: NURSE PRACTITIONER

## 2022-04-26 PROCEDURE — 2022F DILAT RTA XM EVC RTNOPTHY: CPT | Performed by: NURSE PRACTITIONER

## 2022-04-26 PROCEDURE — 83036 HEMOGLOBIN GLYCOSYLATED A1C: CPT | Performed by: NURSE PRACTITIONER

## 2022-04-26 PROCEDURE — 99214 OFFICE O/P EST MOD 30 MIN: CPT | Performed by: NURSE PRACTITIONER

## 2022-04-26 PROCEDURE — 3044F HG A1C LEVEL LT 7.0%: CPT | Performed by: NURSE PRACTITIONER

## 2022-04-26 RX ORDER — METOPROLOL SUCCINATE 25 MG/1
25 TABLET, EXTENDED RELEASE ORAL DAILY
Qty: 30 TABLET | Refills: 0 | Status: SHIPPED | OUTPATIENT
Start: 2022-04-26 | End: 2022-05-02 | Stop reason: SDUPTHER

## 2022-04-26 ASSESSMENT — ENCOUNTER SYMPTOMS
SORE THROAT: 0
COUGH: 0
ABDOMINAL PAIN: 0
VOMITING: 0
BLOOD IN STOOL: 0
BACK PAIN: 0
CONSTIPATION: 0
DIARRHEA: 0
NAUSEA: 0
SHORTNESS OF BREATH: 0
HEARTBURN: 0

## 2022-04-26 NOTE — PROGRESS NOTES
HPI Notes    Name: Eliz Dockery  : 1970         Chief Complaint:     Chief Complaint   Patient presents with    Diabetes Mellitus     Patient here today for checkup    Leg Pain     Patient has restless leg syndrome. She said the requip is not helping with her legs    Shoulder Pain     Patient complains of left shoulder pain.  Hypertension    Hyperlipidemia    Gastroesophageal Reflux       History of Present Illness:        Leg Pain   There was no injury mechanism. The pain is present in the left leg and right leg. The quality of the pain is described as aching. The pain is moderate. The pain has been improving since onset. Pertinent negatives include no numbness or tingling. The symptoms are aggravated by movement and palpation. Treatments tried: requip. Shoulder Pain   The pain is present in the left shoulder. This is a recurrent problem. The current episode started more than 1 month ago. There has been no history of extremity trauma. The problem occurs daily. The problem has been waxing and waning. The quality of the pain is described as aching and sharp. Pertinent negatives include no fever, numbness or tingling. She has tried NSAIDS for the symptoms. The treatment provided mild relief. Hypertension  This is a chronic problem. The current episode started more than 1 year ago. The problem is uncontrolled. Pertinent negatives include no chest pain, headaches, neck pain, palpitations, peripheral edema or shortness of breath. Risk factors for coronary artery disease include diabetes mellitus, dyslipidemia and obesity. Past treatments include ACE inhibitors. The current treatment provides moderate improvement. Compliance problems include diet and exercise. Hyperlipidemia  This is a chronic problem. The problem is controlled. Recent lipid tests were reviewed and are normal. Exacerbating diseases include obesity. Factors aggravating her hyperlipidemia include fatty foods.  Associated symptoms include leg pain. Pertinent negatives include no chest pain or shortness of breath. Current antihyperlipidemic treatment includes statins. The current treatment provides moderate improvement of lipids. Risk factors for coronary artery disease include hypertension and obesity. Gastroesophageal Reflux  She reports no abdominal pain, no chest pain, no coughing, no heartburn, no nausea or no sore throat. This is a chronic problem. The problem occurs rarely. The problem has been waxing and waning. Nothing aggravates the symptoms. Risk factors include caffeine use, lack of exercise and obesity. She has tried a PPI for the symptoms. Diabetes  She presents for her follow-up diabetic visit. She has type 2 diabetes mellitus. There are no hypoglycemic associated symptoms. Pertinent negatives for hypoglycemia include no dizziness, headaches, nervousness/anxiousness, seizures or tremors. Pertinent negatives for diabetes include no chest pain and no weakness. Risk factors for coronary artery disease include diabetes mellitus, dyslipidemia, hypertension and obesity. Current diabetic treatment includes diet and oral agent (monotherapy). She is compliant with treatment all of the time. She is following a generally unhealthy diet. She never participates in exercise. An ACE inhibitor/angiotensin II receptor blocker is being taken.        Past Medical History:     Past Medical History:   Diagnosis Date    Anemia     Depression     Diabetes mellitus ([facility] [address]     Hyperlipidemia     Hypertension       Reviewed all health maintenance requirements and ordered appropriate tests  Health Maintenance Due   Topic Date Due    COVID-19 Vaccine (1) Never done    Pneumococcal 0-64 years Vaccine (1 - PCV) Never done    HIV screen  Never done    Hepatitis C screen  Never done    Hepatitis B vaccine (1 of 3 - Risk 3-dose series) Never done    DTaP/Tdap/Td vaccine (1 - Tdap) Never done    Breast cancer screen  Never done    Shingles Vaccine (1 of 2) Never done    Lipids  05/02/2020    Diabetic retinal exam  07/22/2020    Potassium  02/22/2021    Creatinine  02/22/2021    Colorectal Cancer Screen  02/25/2021       Past Surgical History:     Past Surgical History:   Procedure Laterality Date    CHOLECYSTECTOMY      HYSTERECTOMY      TONSILLECTOMY          Medications:       Prior to Admission medications    Medication Sig Start Date End Date Taking? Authorizing Provider   metoprolol succinate (TOPROL XL) 25 MG extended release tablet Take 1 tablet by mouth daily 4/26/22  Yes TOSHA Diez CNP   rOPINIRole (REQUIP) 1 MG tablet Take 4 mg by mouth nightly Takes 2 tabs nightly   Yes Historical Provider, MD   meloxicam (MOBIC) 15 MG tablet Take 15 mg by mouth daily   Yes Historical Provider, MD   cyclobenzaprine (FLEXERIL) 10 MG tablet Take 10 mg by mouth nightly   Yes Historical Provider, MD   metFORMIN (GLUCOPHAGE-XR) 500 MG extended release tablet TAKE 1 TABLET BY MOUTH EVERY MORNING WITH BREAKFAST 2/10/21  Yes Reece Dickey, DO   loratadine (CLARITIN) 10 MG tablet TAKE (1) TABLET BY MOUTH ONCE DAILY 2/3/21  Yes Reece Dickey,    Multiple Vitamin (MULTIVITAMIN) TABS TAKE 1 TABLET BY MOUTH DAILY 2/3/21  Yes Chuy Bustos,    simvastatin (ZOCOR) 40 MG tablet TAKE 1 TABLET BY MOUTH NIGHTLY 2/3/21  Yes Reece Dickey, DO   omeprazole (PRILOSEC) 20 MG delayed release capsule TAKE ONE (1) CAPSULE BY MOUTH ONCE DAILY 1/6/21  Yes Reece Dickey, DO   TRUEplus Lancets 28G MISC USE TO TEST BLOOD SUGAR DAILY 10/28/20  Yes Chevy Grajeda MD   sertraline (ZOLOFT) 100 MG tablet TAKE (1) TABLET BY MOUTH ONCE DAILY *EMERGENCY REFILL* 10/12/20  Yes Chevy Grajeda MD   lisinopril (PRINIVIL;ZESTRIL) 40 MG tablet TAKE 1 TABLET BY MOUTH DAILY *EMERGENCY REFILL* 10/12/20  Yes Chevy Grajeda MD   blood glucose monitor strips Check daily.  Please dispense true metrix test strips 2/6/20  Yes TOSHA Diez CNP   Alcohol Swabs PADS Check blood glucose daily 2/6/20  Yes TOSHA Moreno CNP   blood glucose monitor kit and supplies Check daily 5/8/19  Yes TOSHA Moreno CNP   gabapentin (NEURONTIN) 600 MG tablet TAKE 1 TABLET BY MOUTH TWICE DAILY  Patient not taking: Reported on 1/5/2021 9/22/20 10/22/20  Loli Mejia MD        Allergies:       Seasonal    Social History:     Tobacco:    reports that she has never smoked. She has never used smokeless tobacco.  Alcohol:      reports no history of alcohol use. Drug Use:  reports no history of drug use. Family History:     No family history on file. Review of Systems:         Review of Systems   Constitutional: Negative for chills and fever. HENT: Negative for sore throat. Respiratory: Negative for cough and shortness of breath. Cardiovascular: Negative for chest pain, palpitations and leg swelling. Gastrointestinal: Negative for abdominal pain, blood in stool, constipation, diarrhea, heartburn, nausea and vomiting. Genitourinary: Negative for dysuria, frequency and hematuria. Musculoskeletal: Negative for back pain and neck pain. Skin: Negative for rash. Neurological: Negative for dizziness, tingling, tremors, seizures, weakness, numbness and headaches. Hematological: Does not bruise/bleed easily. Psychiatric/Behavioral: Negative for suicidal ideas. The patient is not nervous/anxious. Physical Exam:     Vitals:  BP (!) 168/110 (Site: Left Upper Arm)   Pulse 96   Ht 5' 4\" (1.626 m)   Wt 266 lb (120.7 kg)   LMP  (LMP Unknown)   SpO2 97%   BMI 45.66 kg/m²       Physical Exam  Vitals and nursing note reviewed. Constitutional:       Appearance: Normal appearance. She is well-developed. HENT:      Head: Normocephalic.       Right Ear: Hearing, tympanic membrane and external ear normal.      Left Ear: Hearing, tympanic membrane and external ear normal.      Nose: Nose normal.      Mouth/Throat:      Pharynx: Uvula midline. Eyes:      Conjunctiva/sclera: Conjunctivae normal.      Pupils: Pupils are equal, round, and reactive to light. Neck:      Thyroid: No thyroid mass. Vascular: No carotid bruit. Trachea: Trachea normal.   Cardiovascular:      Rate and Rhythm: Normal rate and regular rhythm. Pulses:           Dorsalis pedis pulses are 2+ on the right side and 2+ on the left side. Heart sounds: Normal heart sounds, S1 normal and S2 normal.   Pulmonary:      Effort: Pulmonary effort is normal.      Breath sounds: Normal breath sounds. Abdominal:      Palpations: Abdomen is soft. Tenderness: There is no abdominal tenderness. Musculoskeletal:      Left shoulder: Decreased range of motion. Decreased strength. Cervical back: Normal range of motion. Skin:     General: Skin is warm and dry. Findings: No rash. Neurological:      Mental Status: She is alert and oriented to person, place, and time. GCS: GCS eye subscore is 4. GCS verbal subscore is 5. GCS motor subscore is 6. Deep Tendon Reflexes: Reflexes are normal and symmetric. Psychiatric:         Speech: Speech normal.         Behavior: Behavior normal.         Thought Content:  Thought content normal.         Judgment: Judgment normal.               Data:     Lab Results   Component Value Date     02/22/2020    K 4.5 02/22/2020     02/22/2020    CO2 25 02/22/2020    BUN 14 02/22/2020    CREATININE 0.69 02/22/2020    GLUCOSE 125 02/22/2020    PROT 7.3 02/22/2020    LABALBU 4.3 02/22/2020    BILITOT 0.15 02/22/2020    ALKPHOS 114 02/22/2020    AST 31 02/22/2020    ALT 37 02/22/2020     Lab Results   Component Value Date    WBC 7.3 02/22/2020    RBC 5.17 02/22/2020    HGB 12.9 02/22/2020    HCT 41.6 02/22/2020    MCV 80.5 02/22/2020    MCH 25.0 02/22/2020    MCHC 31.0 02/22/2020    RDW 15.3 02/22/2020     02/22/2020    MPV NOT REPORTED 02/22/2020     Lab Results   Component Value Date    TSH 1.00 01/19/2020     Lab Results   Component Value Date    CHOL 168 05/02/2019    HDL 44 05/02/2019    LABA1C 6.4 04/26/2022          Assessment & Plan        Diagnosis Orders   1. Type 2 diabetes mellitus without complication, without long-term current use of insulin (HCC)  --A1c=6.4% (previously 6.4%). continue metformin 500mg daily and diet modification.  DIABETES FOOT EXAM    POCT glycosylated hemoglobin (Hb A1C)    POCT microalbumin   2. Gastroesophageal reflux disease without esophagitis   --doing well with PPI. 3. Primary hypertension   --BP not controlled. Continue lisinopril 40mg. Will add toprol XL 25mg daily    4. Mixed hyperlipidemia   --continue zocor daily    5. Dysfunction of left rotator cuff   --continue meloxicam. Will send to [facility]   6. Restless legs syndrome   --doing well with 8mg nightly      Patient verbalizes understanding and agreement with plan. All questions answered. If symptoms do not resolve or worsen, return to office. Completed Refills   Requested Prescriptions     Signed Prescriptions Disp Refills    metoprolol succinate (TOPROL XL) 25 MG extended release tablet 30 tablet 0     Sig: Take 1 tablet by mouth daily     No follow-ups on file. Orders Placed This Encounter   Medications    metoprolol succinate (TOPROL XL) 25 MG extended release tablet     Sig: Take 1 tablet by mouth daily     Dispense:  30 tablet     Refill:  0     Orders Placed This Encounter   Procedures    [facility] Physical Therapy [facility] Course     Referral Priority:   Routine     Referral Type:   Eval and Treat     Referral Reason:   Specialty Services Required     Requested Specialty:   Physical Therapy     Number of Visits Requested:   1    POCT glycosylated hemoglobin (Hb A1C)    POCT microalbumin     DIABETES FOOT EXAM         There are no Patient Instructions on file for this visit.     Electronically signed by TOSHA Turner CNP on 4/26/2022 at 2:01 PM Completed Refills   Requested Prescriptions     Signed Prescriptions Disp Refills    metoprolol succinate (TOPROL XL) 25 MG extended release tablet 30 tablet 0     Sig: Take 1 tablet by mouth daily

## 2022-04-26 NOTE — LETTER
[address]  [place] [address]  Phone: [phone]  Fax: [phone]    TOSHA Diaz CNP        April 26, 2022    30-45 grams of carbohydrates per meal  Anything white and fluffy is usually a carbohydrate  Read food labels, pay attention to serving size and carbohydrates  Eat consistently, don't go all day without eating  Eat breakfast, lunch, dinner (30-45 grams carbohydrate per meal)  Bedtime snack of 15g of carbohydrates    No NEVER foods:  Cereal  Oatmeal  [address] [facility] for hypoglycemia (low blood sugar less than 70)    1. Eat 15gm carb snack and 15gm protein snack  2. Wait 15 minutes  3. Recheck blood sugar  4. If still below 70, repeat carb snack.     Examples of 15gm carb snack:    4oz orange juice  4oz cola  6 saltine crackers  3 dereck crackers  1 slice of bread    Examples 15gm protein snack    2oz of chicken or turkey  2oz of salmon or tuna  4tbsp peanut butter

## 2022-05-02 RX ORDER — METOPROLOL SUCCINATE 25 MG/1
25 TABLET, EXTENDED RELEASE ORAL DAILY
Qty: 90 TABLET | Refills: 1 | Status: SHIPPED | OUTPATIENT
Start: 2022-05-02 | End: 2022-10-25

## 2022-05-02 NOTE — TELEPHONE ENCOUNTER
Last OV: 4/26/2022  Next scheduled apt: 7/27/2022    Toprol needs refilled and med pending mail order pharmacy.

## 2022-05-09 ENCOUNTER — HOSPITAL ENCOUNTER (OUTPATIENT)
Dept: PHYSICAL THERAPY | Age: 52
Setting detail: THERAPIES SERIES
Discharge: HOME | End: 2022-05-09
Payer: COMMERCIAL

## 2022-05-09 PROCEDURE — 97162 PT EVAL MOD COMPLEX 30 MIN: CPT

## 2022-05-09 ASSESSMENT — PAIN SCALES - GENERAL: PAINLEVEL_OUTOF10: 6

## 2022-05-09 NOTE — PROGRESS NOTES
Phone: [address] [place]         Fax: [phone]                      Outpatient Physical Therapy                                                                      Evaluation    Date: 2022  Patient: Jamar Valenzuela  : 1970  ACCT #: [de-identified]    Referring Practitioner:Referring Provider (secondary): Liliam Son CNP    Referral Date: 22      Diagnosis: Left shoulder rotator cuff dysfunction    Treatment Diagnosis: Left shoulder pain  Onset Date: 22  PT Insurance Information: [employer]    Per Physician Order  Total # of Visits to Date: 1  No Show: 0  Canceled Appointment: 0     Subjective     Additional Pertinent Hx:  1 year onset of progressive left shoulder pain without etiology. Notes pain with any self-care tasks involving left UE. Notes pain with sleeping. Denies radiating pain extending to hand but does have deltiod and posterior pain. Follow up in July. Limited with self-care and houshold tasks. No recent diagnostic testing. UEFS = 20/80. PMHx includes DM, HTN  Pain Assessment  Pain Level: 6     IADL History  Active : Yes  Occupation: Unemployed  Leisure & Hobbies: Attempts daily household tasks but is limited by pain and immobility    Objective  Vision  Vision: Within Functional Limits  Hearing  Hearing: Within functional limits  Observation/Palpation  Posture: Fair  Palpation: Moderate tenderness surrounding left shoulder/deltoid  Observation:  Forward flexed posture/shoulders     Strength LUE  Comment: Generally 3/5 wtihin available ROM    AROM LUE (degrees)  LUE AROM : Exceptions  L Shoulder Flexion 0-180: 80 deg  L Shoulder ABduction 0-180: 80 deg  L Shoulder Int Rotation  0-70: Functionally able to place hands on hips but not behind back  L Shoulder Ext Rotation  0-90: Functionally unable to reach to base of head without cervical flexion     PROM LUE (degrees)  LUE PROM: Exceptions  L Shoulder Flex  0-170: 90 deg  L Shoulder ABduction 0-140: 70 deg  L Shoulder Int Rotation  0-70: Not tested due to patient apprehension  L Shoulder Ext Rotation  0-90: Not tested due to patient apprehension                                     Assessment  Assessment: Patient presents with left shoulder pain and limited mobility suggestive of rotator cuff dysfunction as well as possible bone spur/OA.   Plan to progress with strengthening and ROM activities and educate on home program  Therapy Prognosis: Good    Clinical Presentation:  Evolving  The Following Comorbities will impact the patients progression and Plan of Care:   Diabetes, Obesity and Previous Orthopedic Injury/Surgery          Medium Complexity    Education: PT POC;  Issued written HEP for shld ext/scap retraction Access Code [email or web address]        Learning  Does the patient/guardian have any barriers to learning?: No barriers  Will there be a co-learner?: No  What is the preferred language of the patient/guardian?: English  Is an  required?: No    Goals  Short Term Goals  Time Frame for Short term goals: 8 visits  Short term goal 1: Educate on home program of postural and rotator cuff strengthening and ROM  STG 1 Current Status[de-identified] Currently no formal ex prior to evaluation  Short term goal 2: PROM flexion 100 deg  STG 2 Current Status[de-identified] Limited to 80 deg  Short term goal 3: PROM abduction  deg  STG 3 Current Status[de-identified] Limited to 80 deg    Long Term Goals  Time Frame for Long term goals : 16 visits  Long term goal 1: Functionally able to reach behind head to complete hair care  Long term goal 2: Subjective left shoulder pain < 5/10 with most daily household tasks  Long term goal 3: Functionally be able to sleep on left shoulder for short period  Long term goal 4: Strength to lift/carry gallon of milk at waist height    Patient's Goal:    Get rid of shoulder pain and be able to use it    Timed Code Treatment Minutes: 0 Minutes  Total Treatment Time: 50     Time In: 0935  Time Out: [address], PT Date: 5/9/2022

## 2022-05-09 NOTE — PLAN OF CARE
[facility]       Phone: [phone]   Date: 2022                      Outpatient Physical Therapy  Fax: [phone] #: [de-identified]                     Plan of Care  [facility]#: [id number]  Patient: Eliz Dockery  : 1970    Referring Provider (secondary): Adri Pizarro CNP    Referral Date: 22      Diagnosis: Left shoulder rotator cuff dysfunction  Onset Date: 22  Treatment Diagnosis: Left shoulder pain      Assessment: Patient presents with left shoulder pain and limited mobility suggestive of rotator cuff dysfunction as well as possible bone spur/OA. Plan to progress with strengthening and ROM activities and educate on home program  Therapy Prognosis: Good    Treatment Plan :   Days: 2 times per week Weeks: 8 weeks      Patient Education/HEP, Therapeutic Exercise, Manual Therapy and Electrical Stimulation (prn)     Goals  Time Frame for Short term goals: 8 visits  Short term goal 1: Educate on home program of postural and rotator cuff strengthening and ROM  Short term goal 2: PROM flexion 100 deg  Short term goal 3: PROM abduction  deg    Time Frame for Long term goals : 16 visits  Long term goal 1: Functionally able to reach behind head to complete hair care  Long term goal 2: Subjective left shoulder pain < 5/10 with most daily household tasks  Long term goal 3: Functionally be able to sleep on left shoulder for short period  Long term goal 4: Strength to lift/carry gallon of milk at waist height     NERIS GOFF, PT   Date: 2022    ______________________________________ Date: 2022  Physician Signature  By signing above or cosigning electronically, I have reviewed this Plan of Care and certify a need for medically necessary rehabilitation services.

## 2022-05-11 NOTE — PROGRESS NOTES
Physical Therapy      Patient contacted this worker on  5/9/12 stating she has had an increase of pain rating 9/10 since her initial evaluation on 5/8/22. She attempted one repetition of her HEP with increased pain and stopped. Instructed patient to apple heat and/or ice and we will assess for appropriate treatment at next session on 5/12/22. May consider gentle ex and modalities.       Tracy Lozoya PT

## 2022-05-12 ENCOUNTER — HOSPITAL ENCOUNTER (OUTPATIENT)
Dept: PHYSICAL THERAPY | Age: 52
Setting detail: THERAPIES SERIES
Discharge: HOME | End: 2022-05-12
Payer: COMMERCIAL

## 2022-05-12 PROCEDURE — 97110 THERAPEUTIC EXERCISES: CPT

## 2022-05-12 ASSESSMENT — PAIN SCALES - GENERAL: PAINLEVEL_OUTOF10: 7

## 2022-05-12 NOTE — PROGRESS NOTES
Phone: Vic Kessler      Fax: [phone]                            Outpatient Physical Therapy                                                                            Daily Note    Date: 2022  Patient Name: Jamar Valenzuela        MRN: [id number]   ACCT#:  [de-identified]  : 1970  (47 y.o.)    Referring Provider (secondary): Liliam Son CNP         Diagnosis: Left shoulder rotator cuff dysfunction  Treatment Diagnosis: Left shoulder pain    Onset Date: 22  PT Insurance Information: [id number]    Per Physician Order  Total # of Visits to Date: 2  No Show: 0  Canceled Appointment: 0  Plan of Care/Certification Expiration Date: 22    Pre-Treatment Pain:  7/10     Assessment  Assessment: Patient arrived late d/t van picking her up late. Pt completed exercises per log stating UBE felt good up to 2 minutes. Limited exercises performed d/t lack of time. PROM shoulder flexion =  97 AAROM degrees pulleys. Plan to progress per pt tolerance.     Plan  Continue with current plan of care    Exercises/Modalities/Manual:  See DocFlow Sheet    Education:           Goals  (Total # of Visits to Date: 2)  Short Term Goals - Time Frame for Short term goals: 8 visits  Short Term Goals  Time Frame for Short term goals: 8 visits  Short term goal 1: Educate on home program of postural and rotator cuff strengthening and ROM  STG 1 Current Status[de-identified] Currently no formal ex prior to evaluation  Short term goal 2: PROM flexion 100 deg  STG 2 Current Status[de-identified] Limited to 80 deg  Short term goal 3: PROM abduction  deg  STG 3 Current Status[de-identified] Limited to 80 deg    Long Term Goals - Time Frame for Long term goals : 16 visits  Long Term Goals  Time Frame for Long term goals : 16 visits  Long term goal 1: Functionally able to reach behind head to complete hair care  Long term goal 2: Subjective left shoulder pain < 5/10 with most daily household tasks  Long term goal 3: Functionally be able to sleep on left shoulder for short period  Long term goal 4: Strength to lift/carry gallon of milk at waist height    Post Treatment Pain:  5/10      Time In: 0923  Time Out: 0947  Timed Code Treatment Minutes: 27 Minutes  Total Treatment Time: [address], [facility]     Date: 5/12/2022

## 2022-05-17 ENCOUNTER — HOSPITAL ENCOUNTER (OUTPATIENT)
Dept: PHYSICAL THERAPY | Age: 52
Setting detail: THERAPIES SERIES
Discharge: HOME | End: 2022-05-17
Payer: COMMERCIAL

## 2022-05-17 PROCEDURE — G0283 ELEC STIM OTHER THAN WOUND: HCPCS

## 2022-05-17 PROCEDURE — 97110 THERAPEUTIC EXERCISES: CPT

## 2022-05-17 ASSESSMENT — PAIN SCALES - GENERAL: PAINLEVEL_OUTOF10: 5

## 2022-05-17 NOTE — PROGRESS NOTES
Phone: Vic Kessler      Fax: [phone]                            Outpatient Physical Therapy                                                                            Daily Note    Date: 2022  Patient Name: Yamile Hernandez        MRN: [id number]   ACCT#:  [de-identified]  : 1970  (47 y.o.)    Referring Provider (secondary): Jesus Agustin CNP         Diagnosis: Left shoulder rotator cuff dysfunction  Treatment Diagnosis: Left shoulder pain    Onset Date: 22  PT Insurance Information: Teresa Bentley    Per Physician Order  Total # of Visits to Date: 3  No Show: 0  Canceled Appointment: 0  Plan of Care/Certification Expiration Date: 22    Pre-Treatment Pain:  5/10     Assessment  Assessment: Patient arrives reporting left shoulder pain 5/10 and feels exercise is irritating her shoulder. Very limited exercise this date due to apin and crepitus with movement. Initiated Estim toleft shoulder in sitting  ( limited tolerance to intensity @ 7)  Plan to monitor tolerance to modalities;  consider taping.     Plan  Continue with current plan of care    Exercises/Modalities/Manual:  See DocFlow Sheet    Education: Educated on benefits of Estim          Goals  (Total # of Visits to Date: 3)   Short Term Goals - Time Frame for Short term goals: 8 visits  Short Term Goals  Time Frame for Short term goals: 8 visits  Short term goal 1: Educate on home program of postural and rotator cuff strengthening and ROM  STG 1 Current Status[de-identified] Currently no formal ex prior to evaluation  Short term goal 2: PROM flexion 100 deg  STG 2 Current Status[de-identified] Limited to 80 deg  Short term goal 3: PROM abduction  deg  STG 3 Current Status[de-identified] Limited to 80 deg    Long Term Goals - Time Frame for Long term goals : 16 visits  Long Term Goals  Time Frame for Long term goals : 16 visits  Long term goal 1: Functionally able to reach behind head to complete hair care  Long term goal 2: Subjective left shoulder pain < 5/10 with most daily household tasks  Long term goal 3: Functionally be able to sleep on left shoulder for short period  Long term goal 4: Strength to lift/carry gallon of milk at waist height    Post Treatment Pain:  5/10    Time In: 0915    Time Out : 0950        Timed Code Treatment Minutes: 35 Minutes  Total Treatment Time: [address], PT     Date: 5/17/2022

## 2022-05-19 ENCOUNTER — TELEPHONE (OUTPATIENT)
Dept: FAMILY MEDICINE CLINIC | Age: 52
End: 2022-05-19

## 2022-05-19 ENCOUNTER — HOSPITAL ENCOUNTER (OUTPATIENT)
Dept: PHYSICAL THERAPY | Age: 52
Setting detail: THERAPIES SERIES
Discharge: HOME | End: 2022-05-19
Payer: COMMERCIAL

## 2022-05-19 DIAGNOSIS — M62.81 MUSCLE WEAKNESS OF LEFT ARM: ICD-10-CM

## 2022-05-19 DIAGNOSIS — M25.612 DECREASED ROM OF LEFT SHOULDER: Primary | ICD-10-CM

## 2022-05-19 PROCEDURE — 97110 THERAPEUTIC EXERCISES: CPT

## 2022-05-19 PROCEDURE — G0283 ELEC STIM OTHER THAN WOUND: HCPCS

## 2022-05-19 ASSESSMENT — PAIN SCALES - GENERAL: PAINLEVEL_OUTOF10: 6

## 2022-05-19 NOTE — PROGRESS NOTES
Phone: Vic Kessler      Fax: [phone]                            Outpatient Physical Therapy                                                                            Daily Note    Date: 2022  Patient Name: Jess Khoury        MRN: [id number]   ACCT#:  [de-identified]  : 1970  (47 y.o.)    Referring Provider (secondary): Geri Ramon CNP         Diagnosis: Left shoulder rotator cuff dysfunction  Treatment Diagnosis: Left shoulder pain    Onset Date: 22  PT Insurance Information: [employer]    Per Physician Order  Total # of Visits to Date: 4  No Show: 0  Canceled Appointment: 0  Plan of Care/Certification Expiration Date: 22    Pre-Treatment Pain:  6-8/10     Assessment  Assessment: Patient presents with left shoulder pain rated 6-8/10 with limited tolerance to exercise or ROM. Left shoulder symptoms and appearance suggestive of advanced OA. Estim applied for pain reduction and patient educated on supine cane flex to prevent loss of ROM. Based on status , plan to contact MD regarding additional diagnostic testing and ortho referral.  Will follow up with patient 1x next week. Plan  Continue with current plan of care;   Will contact MD/CNP regarding statu    Exercises/Modalities/Manual:  See DocFlow Sheet    Education: Supine cane flexion to prevent loss of ROM          Goals  (Total # of Visits to Date: 4)   Short Term Goals - Time Frame for Short term goals: 8 visits  Short Term Goals  Time Frame for Short term goals: 8 visits  Short term goal 1: Educate on home program of postural and rotator cuff strengthening and ROM  STG 1 Current Status[de-identified] Currently no formal ex prior to evaluation  Short term goal 2: PROM flexion 100 deg  STG 2 Current Status[de-identified] Limited to 80 deg  Short term goal 3: PROM abduction  deg  STG 3 Current Status[de-identified] Limited to 80 deg    Long Term Goals - Time Frame for Long term goals : 16 visits  Long Term Goals  Time Frame for Long term goals : 16 visits  Long term goal 1: Functionally able to reach behind head to complete hair care  Long term goal 2: Subjective left shoulder pain < 5/10 with most daily household tasks  Long term goal 3: Functionally be able to sleep on left shoulder for short period  Long term goal 4: Strength to lift/carry gallon of milk at waist height    Post Treatment Pain:  6/10    Time In: 1030    Time Out : 1115        Timed Code Treatment Minutes: 40 Minutes  Total Treatment Time: 40 [id number], MARLEY     Date: 5/19/2022

## 2022-05-19 NOTE — TELEPHONE ENCOUNTER
I spoke with physical therapist, Jaylan Javed, and she relates that despite gentle range of motion exercises and neuro stim, patient is not improving.   Will order MRI without contrast.

## 2022-05-19 NOTE — TELEPHONE ENCOUNTER
Pt called in stating she's been going to PT and it's not helping. PT was going to call PCP and talk with him. Pt is wanting to know if Catherine Rubio wants and MRI done? Or if she needs an orthopedic referral. Please advise [phone].  Would like a call back from Bernabe Mcfarlane

## 2022-05-20 ENCOUNTER — TELEPHONE (OUTPATIENT)
Dept: FAMILY MEDICINE CLINIC | Age: 52
End: 2022-05-20

## 2022-05-20 NOTE — PROGRESS NOTES
Phone: [facility]            Fax: [phone]                             Outpatient Physical Therapy                                                                      Progress Report    Date: 2022   MRN: [id number]    ACCT#: [de-identified]  Patient: Rell Benitez  : 1970  Referring Provider : Loulou Rolon CNP         Diagnosis: Left shoulder rotator cuff dysfunction    Treatment Diagnosis: Left shoulder pain  Onset Date: 22  PT Insurance Information: [address] [place] [address]    Total # of Visits to Date: 4    Assessment: Patient presents with left shoulder pain rated 6-8/10 with limited tolerance to exercise or ROM. Left shoulder symptoms and appearance suggestive of advanced OA. Estim applied for pain reduction and patient educated on supine cane flex to prevent loss of ROM. She has completed 4 treatment session thus far however based on her status and limited tolerance to exercise, manual therapy or modalities would recommend holding treatment and proceed with  additional diagnostic testing and/or ortho referral.      Plan:  Contact MD regarding limited progress and current status.      Goals:  Time Frame for Short term goals: 8 visits  Short term goal 1: Educate on home program of postural and rotator cuff strengthening and ROM  Short term goal 2: PROM flexion 100 deg  Short term goal 3: PROM abduction  deg    Time Frame for Long term goals : 16 visits  Long term goal 1: Functionally able to reach behind head to complete hair care  Long term goal 2: Subjective left shoulder pain < 5/10 with most daily household tasks  Long term goal 3: Functionally be able to sleep on left shoulder for short period  Long term goal 4: Strength to lift/carry gallon of milk at waist height    NERIS GOFF, PT    Date: 2022

## 2022-05-23 ENCOUNTER — APPOINTMENT (OUTPATIENT)
Dept: PHYSICAL THERAPY | Age: 52
End: 2022-05-23
Payer: COMMERCIAL

## 2022-05-23 ENCOUNTER — TELEPHONE (OUTPATIENT)
Dept: FAMILY MEDICINE CLINIC | Age: 52
End: 2022-05-23

## 2022-05-23 NOTE — TELEPHONE ENCOUNTER
Soto Graham wants to know if she should continue going to PT or hold off until she has the MRI done. Please let Soto Graham know.     Health Maintenance   Topic Date Due    COVID-19 Vaccine (1) Never done    Pneumococcal 0-64 years Vaccine (1 - PCV) Never done    HIV screen  Never done    Hepatitis C screen  Never done    Hepatitis B vaccine (1 of 3 - Risk 3-dose series) Never done    DTaP/Tdap/Td vaccine (1 - Tdap) Never done    Breast cancer screen  Never done    Shingles vaccine (1 of 2) Never done    Lipids  05/02/2020    Diabetic retinal exam  07/22/2020    Colorectal Cancer Screen  02/25/2021    Flu vaccine (Season Ended) 09/01/2022    Depression Screen  04/12/2023    Diabetic foot exam  04/26/2023    A1C test (Diabetic or Prediabetic)  04/26/2023    Diabetic microalbuminuria test  04/26/2023    Hepatitis A vaccine  Aged Out    Hib vaccine  Aged Out    Meningococcal (ACWY) vaccine  Aged Out             (applicable per patient's age: Cancer Screenings, Depression Screening, Fall Risk Screening, Immunizations)    Hemoglobin A1C (%)   Date Value   04/26/2022 6.4   02/04/2020 6.4   10/10/2019 6.1     LDL Cholesterol (mg/dL)   Date Value   05/02/2019 97     AST (U/L)   Date Value   02/22/2020 31     ALT (U/L)   Date Value   02/22/2020 37 (H)     BUN (mg/dL)   Date Value   02/22/2020 14      (goal A1C is < 7)   (goal LDL is <100) need 30-50% reduction from baseline     BP Readings from Last 3 Encounters:   04/26/22 (!) 168/110   04/18/22 (!) 174/111   04/12/22 128/86    (goal /80)      All Future Testing planned in [facility]:  Lab Frequency Next Occurrence   EMG Once 12/23/2022   JAYY EDWIN DIGITAL SCREEN BILATERAL Once 04/12/2022   Dermatology Pathology Once 04/12/2022   MRI SHOULDER LEFT WO CONTRAST Once 05/19/2022       Next Visit Date:  Future Appointments   Date Time Provider Marilyn Lundberg   5/26/2022  8:30 AM [facility]  [facility]   5/26/2022  9:00 AM Eugena Schlatter, PT

## 2022-05-24 ENCOUNTER — OFFICE VISIT (OUTPATIENT)
Dept: FAMILY MEDICINE CLINIC | Age: 52
End: 2022-05-24
Payer: COMMERCIAL

## 2022-05-24 VITALS
OXYGEN SATURATION: 98 % | SYSTOLIC BLOOD PRESSURE: 136 MMHG | TEMPERATURE: 98.3 F | BODY MASS INDEX: 46.69 KG/M2 | WEIGHT: 272 LBS | DIASTOLIC BLOOD PRESSURE: 90 MMHG

## 2022-05-24 DIAGNOSIS — M25.612 DECREASED ROM OF LEFT SHOULDER: ICD-10-CM

## 2022-05-24 DIAGNOSIS — J06.9 VIRAL URI: Primary | ICD-10-CM

## 2022-05-24 PROCEDURE — 1036F TOBACCO NON-USER: CPT | Performed by: NURSE PRACTITIONER

## 2022-05-24 PROCEDURE — G8417 CALC BMI ABV UP PARAM F/U: HCPCS | Performed by: NURSE PRACTITIONER

## 2022-05-24 PROCEDURE — G8427 DOCREV CUR MEDS BY ELIG CLIN: HCPCS | Performed by: NURSE PRACTITIONER

## 2022-05-24 PROCEDURE — 3017F COLORECTAL CA SCREEN DOC REV: CPT | Performed by: NURSE PRACTITIONER

## 2022-05-24 PROCEDURE — 99213 OFFICE O/P EST LOW 20 MIN: CPT | Performed by: NURSE PRACTITIONER

## 2022-05-24 ASSESSMENT — ENCOUNTER SYMPTOMS
SORE THROAT: 0
DIARRHEA: 0
VOMITING: 0
SINUS PAIN: 1
NAUSEA: 0
SHORTNESS OF BREATH: 0
RHINORRHEA: 1
COUGH: 1

## 2022-05-24 NOTE — LETTER
[address] 58  [address] [address]  Phone: [phone]  Fax: Gianluca Ngaandreaminata 2751, APRN - CNP        May 24, 2022    [facility] [address]      Dear Loan Villatoro:    Coricidin HBP Cold and Cough 1 tab every 6 hours as needed (nasal decongestant and antihistamine)  Flonase 1 spray each nostril twice daily (nasal steroid)  Ibuprofen 3 times a day (antiinflammatory)  Zinc Sulfate 50mg Daily  Vitamin C 1000mg Daily  Vitamin D3 2000IU Daily   Warm tea with 1tbsp honey (soothes the throat)  Increase water intake  Rest      If you have any questions or concerns, please don't hesitate to call.     Sincerely,          Irene Dominguez, TOSHA - CNP

## 2022-05-24 NOTE — PROGRESS NOTES
HPI Notes    Name: Hu Pool  : 1970         Chief Complaint:     Chief Complaint   Patient presents with    URI     Patient here today with cough, sinus drainage, eyes hurt. Started feeling bad yesterday.  Shoulder Pain     Discuss MRI       History of Present Illness:        URI   This is a new problem. The current episode started yesterday. The problem has been waxing and waning. There has been no fever. Associated symptoms include congestion, coughing, rhinorrhea, sinus pain and sneezing. Pertinent negatives include no chest pain, diarrhea, headaches, nausea, sore throat or vomiting. She has tried nothing for the symptoms. Shoulder Pain   The pain is present in the left shoulder. This is a chronic problem. The current episode started more than 1 year ago. There has been no history of extremity trauma. The quality of the pain is described as aching and sharp. Pertinent negatives include no fever. The symptoms are aggravated by activity.        Past Medical History:     Past Medical History:   Diagnosis Date    Anemia     Depression     Diabetes mellitus ([facility] [address]     Hyperlipidemia     Hypertension       Reviewed all health maintenance requirements and ordered appropriate tests  Health Maintenance Due   Topic Date Due    COVID-19 Vaccine (1) Never done    Pneumococcal 0-64 years Vaccine (1 - PCV) Never done    HIV screen  Never done    Hepatitis C screen  Never done    Hepatitis B vaccine (1 of 3 - Risk 3-dose series) Never done    DTaP/Tdap/Td vaccine (1 - Tdap) Never done    Breast cancer screen  Never done    Shingles vaccine (1 of 2) Never done    Lipids  2020    Diabetic retinal exam  2020    Colorectal Cancer Screen  2021       Past Surgical History:     Past Surgical History:   Procedure Laterality Date    CHOLECYSTECTOMY      HYSTERECTOMY      TONSILLECTOMY          Medications:       Prior to Admission medications    Medication Sig Start Date End Date Taking? Authorizing Provider   metoprolol succinate (TOPROL XL) 25 MG extended release tablet Take 1 tablet by mouth daily 5/2/22  Yes TOSHA Salguero CNP   rOPINIRole (REQUIP) 1 MG tablet Take 4 mg by mouth nightly Takes 2 tabs nightly   Yes Historical Provider, MD   meloxicam (MOBIC) 15 MG tablet Take 15 mg by mouth daily   Yes Historical Provider, MD   cyclobenzaprine (FLEXERIL) 10 MG tablet Take 10 mg by mouth nightly   Yes Historical Provider, MD   metFORMIN (GLUCOPHAGE-XR) 500 MG extended release tablet TAKE 1 TABLET BY MOUTH EVERY MORNING WITH BREAKFAST 2/10/21  Yes Nikhil Dickey,    loratadine (CLARITIN) 10 MG tablet TAKE (1) TABLET BY MOUTH ONCE DAILY 2/3/21  Yes Nikhil Dickey DO   Multiple Vitamin (MULTIVITAMIN) TABS TAKE 1 TABLET BY MOUTH DAILY 2/3/21  Yes Joss Figueroa,    simvastatin (ZOCOR) 40 MG tablet TAKE 1 TABLET BY MOUTH NIGHTLY 2/3/21  Yes Nikhil Dickey DO   omeprazole (PRILOSEC) 20 MG delayed release capsule TAKE ONE (1) CAPSULE BY MOUTH ONCE DAILY 1/6/21  Yes Nikhil Dickey DO   TRUEplus Lancets 28G MISC USE TO TEST BLOOD SUGAR DAILY 10/28/20  Yes Mick Fitzgerald MD   sertraline (ZOLOFT) 100 MG tablet TAKE (1) TABLET BY MOUTH ONCE DAILY *EMERGENCY REFILL* 10/12/20  Yes Mick Fitzgerald MD   lisinopril (PRINIVIL;ZESTRIL) 40 MG tablet TAKE 1 TABLET BY MOUTH DAILY *EMERGENCY REFILL* 10/12/20  Yes Mick Fitzgerald MD   blood glucose monitor strips Check daily.  Please dispense true metrix test strips 2/6/20  Yes TOSHA Salguero CNP   Alcohol Swabs PADS Check blood glucose daily 2/6/20  Yes TOSHA Salguero CNP   blood glucose monitor kit and supplies Check daily 5/8/19  Yes TOSHA Salguero CNP   gabapentin (NEURONTIN) 600 MG tablet TAKE 1 TABLET BY MOUTH TWICE DAILY  Patient not taking: Reported on 1/5/2021 9/22/20 10/22/20  Opal Munoz MD        Allergies:       Seasonal    Social History:     Tobacco:    reports that she has never smoked. She has never used smokeless tobacco.  Alcohol:      reports no history of alcohol use. Drug Use:  reports no history of drug use. Family History:     No family history on file. Review of Systems:         Review of Systems   Constitutional: Negative for chills and fever. HENT: Positive for congestion, rhinorrhea, sinus pain and sneezing. Negative for sore throat. Respiratory: Positive for cough. Negative for shortness of breath. Cardiovascular: Negative for chest pain and palpitations. Gastrointestinal: Negative for diarrhea, nausea and vomiting. Neurological: Negative for dizziness, seizures and headaches. Physical Exam:     Vitals:  BP (!) 136/90 (Site: Left Upper Arm)   Temp 98.3 °F (36.8 °C) (Oral)   Wt 272 lb (123.4 kg)   LMP  (LMP Unknown)   SpO2 98%   BMI 46.69 kg/m²       Physical Exam  Vitals and nursing note reviewed. Constitutional:       Appearance: She is well-developed. HENT:      Nose: Rhinorrhea present. Mouth/Throat:      Pharynx: Posterior oropharyngeal erythema present. No oropharyngeal exudate. Cardiovascular:      Rate and Rhythm: Normal rate and regular rhythm. Pulmonary:      Effort: Pulmonary effort is normal. No respiratory distress. Breath sounds: Normal breath sounds. Skin:     General: Skin is warm and dry. Neurological:      Mental Status: She is alert and oriented to person, place, and time.                Data:     Lab Results   Component Value Date     02/22/2020    K 4.5 02/22/2020     02/22/2020    CO2 25 02/22/2020    BUN 14 02/22/2020    CREATININE 0.69 02/22/2020    GLUCOSE 125 02/22/2020    PROT 7.3 02/22/2020    LABALBU 4.3 02/22/2020    BILITOT 0.15 02/22/2020    ALKPHOS 114 02/22/2020    AST 31 02/22/2020    ALT 37 02/22/2020     Lab Results   Component Value Date    WBC 7.3 02/22/2020    RBC 5.17 02/22/2020    HGB 12.9 02/22/2020    HCT 41.6 02/22/2020    MCV 80.5 02/22/2020    MCH 25.0 02/22/2020    MCHC 31.0 02/22/2020    RDW 15.3 02/22/2020     02/22/2020    MPV NOT REPORTED 02/22/2020     Lab Results   Component Value Date    TSH 1.00 01/19/2020     Lab Results   Component Value Date    CHOL 168 05/02/2019    HDL 44 05/02/2019    LABA1C 6.4 04/26/2022          Assessment & Plan        Diagnosis Orders   1. Viral URI     2. Decreased ROM of left shoulder       Coricidin HBP Cold and Cough 1 tab every 6 hours as needed (nasal decongestant and antihistamine)  Flonase 1 spray each nostril twice daily (nasal steroid)  Ibuprofen 3 times a day (antiinflammatory)  Zinc Sulfate 50mg Daily  Vitamin C 1000mg Daily  Vitamin D3 2000IU Daily   Warm tea with 1tbsp honey (soothes the throat)  Increase water intake  Rest    Pt strongly encouraged to get MRI of left shoulder. Completed Refills   Requested Prescriptions      No prescriptions requested or ordered in this encounter     No follow-ups on file. No orders of the defined types were placed in this encounter. No orders of the defined types were placed in this encounter. Patient Instructions   SURVEY:    You may be receiving a survey from [employer] regarding your visit today. Please complete the survey to enable us to provide the highest quality of care to you and your family. If you cannot score us a very good (5 Stars) on any question, please call the office to discuss how we could have made your experience a very good one. Thank you.     Clinical Care Team: GROVER Rendon LPN    Clerical Team: Richie Main        Electronically signed by TOSHA Rendon CNP on 5/24/2022 at 1:46 PM           Completed Refills   Requested Prescriptions      No prescriptions requested or ordered in this encounter

## 2022-05-24 NOTE — PATIENT INSTRUCTIONS
SURVEY:    You may be receiving a survey from [employer] regarding your visit today. Please complete the survey to enable us to provide the highest quality of care to you and your family. If you cannot score us a very good (5 Stars) on any question, please call the office to discuss how we could have made your experience a very good one. Thank you.     Clinical Care Team: GROVER Jordan, RAMON    Clerical Team: Tess Noland

## 2022-05-26 ENCOUNTER — HOSPITAL ENCOUNTER (OUTPATIENT)
Dept: PHYSICAL THERAPY | Age: 52
Setting detail: THERAPIES SERIES
End: 2022-05-26
Payer: COMMERCIAL

## 2022-05-27 RX ORDER — PRAMIPEXOLE DIHYDROCHLORIDE 0.12 MG/1
0.12 TABLET ORAL NIGHTLY
Qty: 90 TABLET | Refills: 1 | Status: SHIPPED | OUTPATIENT
Start: 2022-05-27 | End: 2022-05-27 | Stop reason: SDUPTHER

## 2022-05-27 RX ORDER — PRAMIPEXOLE DIHYDROCHLORIDE 0.12 MG/1
0.12 TABLET ORAL NIGHTLY
COMMUNITY
End: 2022-05-27 | Stop reason: SDUPTHER

## 2022-05-27 RX ORDER — PRAMIPEXOLE DIHYDROCHLORIDE 0.12 MG/1
0.12 TABLET ORAL NIGHTLY
Qty: 14 TABLET | Refills: 0 | Status: SHIPPED | OUTPATIENT
Start: 2022-05-27

## 2022-05-27 NOTE — TELEPHONE ENCOUNTER
According to med list, she has 1mg tabs. 4mg per day is the max dose. I can send 4mg tab if she would like. Or we could try a different medication like mirapex 0.125mg 2 hours prior to bed.

## 2022-05-27 NOTE — TELEPHONE ENCOUNTER
Spoke with patient regarding her RLS. She said the requip is not helping her, she has the requip 4 mg tablets.    Patient said she is taking two tablets of the 4 mg tabs  Told patient she can not take 2 tabs, maximum dose of the requip is 4 mg  She would like to try the mirapex  Order pending for mirapex 90 to [facility]  Then she needs 2 week supply to janel castillo

## 2022-05-27 NOTE — TELEPHONE ENCOUNTER
Pt needs  Clarification on her requip states the instructions say take 4 mg nightly but it is not helping. In her medication review there is 2 instructions 4 mg nightly  and 2 tabs nightly. Can thisd be increased. Pt states if you do increase it she will need one to go to her short term [facility] and then to her long term [facility]. Please advise.     Pt would like a cb from [employer]

## 2022-06-28 ENCOUNTER — TELEPHONE (OUTPATIENT)
Dept: FAMILY MEDICINE CLINIC | Age: 52
End: 2022-06-28

## 2022-06-28 NOTE — TELEPHONE ENCOUNTER
Patient asking for Ashley Doherty to call her before she starts her day - would not give me any information    Health Maintenance   Topic Date Due    COVID-19 Vaccine (1) Never done    Pneumococcal 0-64 years Vaccine (1 - PCV) Never done    HIV screen  Never done    Hepatitis C screen  Never done    Hepatitis B vaccine (1 of 3 - Risk 3-dose series) Never done    DTaP/Tdap/Td vaccine (1 - Tdap) Never done    Breast cancer screen  Never done    Shingles vaccine (1 of 2) Never done    Lipids  05/02/2020    Diabetic retinal exam  07/22/2020    Colorectal Cancer Screen  02/25/2021    Flu vaccine (Season Ended) 09/01/2022    Depression Screen  04/12/2023    Diabetic foot exam  04/26/2023    A1C test (Diabetic or Prediabetic)  04/26/2023    Diabetic microalbuminuria test  04/26/2023    Hepatitis A vaccine  Aged Out    Hib vaccine  Aged Out    Meningococcal (ACWY) vaccine  Aged Out             (applicable per patient's age: Cancer Screenings, Depression Screening, Fall Risk Screening, Immunizations)    Hemoglobin A1C (%)   Date Value   04/26/2022 6.4   02/04/2020 6.4   10/10/2019 6.1     LDL Cholesterol (mg/dL)   Date Value   05/02/2019 97     AST (U/L)   Date Value   02/22/2020 31     ALT (U/L)   Date Value   02/22/2020 37 (H)     BUN (mg/dL)   Date Value   02/22/2020 14      (goal A1C is < 7)   (goal LDL is <100) need 30-50% reduction from baseline     BP Readings from Last 3 Encounters:   05/24/22 (!) 136/90   04/26/22 (!) 168/110   04/18/22 (!) 174/111    (goal /80)      All Future Testing planned in [facility]:  Lab Frequency Next Occurrence   EMG Once 12/23/2022   JAYY EDWIN DIGITAL SCREEN BILATERAL Once 04/12/2022   Dermatology Pathology Once 04/12/2022   MRI SHOULDER LEFT WO CONTRAST Once 05/19/2022       Next Visit Date:  Future Appointments   Date Time Provider Marilyn Lundberg   7/27/2022 10:00 AM [facility]            Patient Active Problem List:     Type 2

## 2022-06-28 NOTE — TELEPHONE ENCOUNTER
Patient complains of URI symptoms, told patient she will need to be seen at walk in for these symptoms. No openings in our today. Reminded patient that she needs to get MRI and mammogram done , she cancelled her last apt for these tests.   Patient voices understanding

## 2022-10-25 RX ORDER — METOPROLOL SUCCINATE 25 MG/1
25 TABLET, EXTENDED RELEASE ORAL DAILY
Qty: 30 TABLET | Refills: 5 | Status: SHIPPED | OUTPATIENT
Start: 2022-10-25

## 2022-10-25 NOTE — TELEPHONE ENCOUNTER
Last OV: 5/24/2022 URI  Last RX:   Next scheduled apt: Visit date not found      Sure scripts request      RX pending

## 2022-12-05 ENCOUNTER — HOSPITAL ENCOUNTER (EMERGENCY)
Age: 52
Discharge: HOME | End: 2022-12-05
Attending: EMERGENCY MEDICINE
Payer: COMMERCIAL

## 2022-12-05 VITALS
HEART RATE: 109 BPM | OXYGEN SATURATION: 96 % | TEMPERATURE: 98.9 F | BODY MASS INDEX: 46.44 KG/M2 | HEIGHT: 64 IN | DIASTOLIC BLOOD PRESSURE: 118 MMHG | RESPIRATION RATE: 22 BRPM | WEIGHT: 272 LBS | SYSTOLIC BLOOD PRESSURE: 165 MMHG

## 2022-12-05 DIAGNOSIS — S39.012A LUMBAR STRAIN, INITIAL ENCOUNTER: Primary | ICD-10-CM

## 2022-12-05 DIAGNOSIS — I10 ESSENTIAL HYPERTENSION: ICD-10-CM

## 2022-12-05 LAB
-: ABNORMAL
BACTERIA: ABNORMAL
BILIRUBIN URINE: NEGATIVE
COLOR: YELLOW
COMMENT UA: ABNORMAL
EPITHELIAL CELLS UA: ABNORMAL /HPF
GLUCOSE URINE: NEGATIVE
KETONES, URINE: NEGATIVE
LEUKOCYTE ESTERASE, URINE: NEGATIVE
MUCUS: ABNORMAL
NITRITE, URINE: NEGATIVE
PH UA: 7 (ref 5–8)
PROTEIN UA: ABNORMAL
RBC UA: ABNORMAL /HPF (ref 0–2)
SPECIFIC GRAVITY UA: 1.01 (ref 1–1.03)
TURBIDITY: CLEAR
URINE HGB: NEGATIVE
UROBILINOGEN, URINE: NORMAL
WBC UA: ABNORMAL /HPF

## 2022-12-05 PROCEDURE — 81001 URINALYSIS AUTO W/SCOPE: CPT

## 2022-12-05 PROCEDURE — 99283 EMERGENCY DEPT VISIT LOW MDM: CPT

## 2022-12-05 PROCEDURE — 6370000000 HC RX 637 (ALT 250 FOR IP): Performed by: EMERGENCY MEDICINE

## 2022-12-05 RX ORDER — ACETAMINOPHEN 325 MG/1
650 TABLET ORAL ONCE
Status: COMPLETED | OUTPATIENT
Start: 2022-12-05 | End: 2022-12-05

## 2022-12-05 RX ADMIN — ACETAMINOPHEN 650 MG: 325 TABLET, FILM COATED ORAL at 19:04

## 2022-12-05 ASSESSMENT — ENCOUNTER SYMPTOMS
CHEST TIGHTNESS: 0
VOMITING: 0
DIARRHEA: 0
ABDOMINAL PAIN: 0
RHINORRHEA: 0
SORE THROAT: 0
EYE PAIN: 0
SHORTNESS OF BREATH: 0
TROUBLE SWALLOWING: 0
BACK PAIN: 1
WHEEZING: 0

## 2022-12-05 ASSESSMENT — PAIN - FUNCTIONAL ASSESSMENT: PAIN_FUNCTIONAL_ASSESSMENT: NONE - DENIES PAIN

## 2022-12-05 ASSESSMENT — PAIN DESCRIPTION - LOCATION
LOCATION: FLANK
LOCATION: BACK

## 2022-12-05 ASSESSMENT — PAIN SCALES - GENERAL
PAINLEVEL_OUTOF10: 0
PAINLEVEL_OUTOF10: 0

## 2022-12-05 ASSESSMENT — PAIN DESCRIPTION - PAIN TYPE: TYPE: ACUTE PAIN

## 2022-12-05 ASSESSMENT — PAIN DESCRIPTION - FREQUENCY: FREQUENCY: INTERMITTENT

## 2022-12-05 NOTE — ED PROVIDER NOTES
@@  eMERGENCY dEPARTMENT eNCOUnter      Pt Name: Carmelita Sung  MRN: [id number]  [place] 1970  Date of evaluation: 12/5/2022  Provider: Elidia Medina MD    CHIEF COMPLAINT       Chief Complaint   Patient presents with    Flank Pain     Bilateral flank pain that started Saturday. HISTORYOF PRESENT ILLNESS   (Location/Symptom, Timing/Onset, Context/Setting, Quality, Duration, ModifyingFactors, Severity) Note limiting factors. HPI    Carmelita Sung is a 47 y.o. female who presents to the emergency department 3-day history of back pain. She describes it as a dull ache in the lumbar region bilaterally. No radiation to the legs, no bowel or bladder dysfunction, she was triaged as flank pain but she really did not have any flank pain which is in the back. She has no anterior abdominal pain, no nausea, vomiting, fever. She states she came because she was worried because she knew her kidneys were in the back but she is never had kidney stones, does not have any dysuria or urinary frequency today. She is a diabetic, her blood sugar was 106 earlier today. Nursing Notes were reviewed. REVIEW OF SYSTEMS    (2+ for level 4; 10+ for level 5)   Review of Systems   Constitutional:  Negative for diaphoresis and fever. HENT:  Negative for rhinorrhea, sore throat and trouble swallowing. Eyes:  Negative for pain. Respiratory:  Negative for chest tightness, shortness of breath and wheezing. Cardiovascular:  Negative for chest pain and leg swelling. Gastrointestinal:  Negative for abdominal pain, diarrhea and vomiting. Endocrine: Negative for polyuria. Genitourinary:  Negative for dysuria and frequency. Musculoskeletal:  Positive for back pain. Negative for myalgias, neck pain and neck stiffness. Skin:  Negative for rash. Allergic/Immunologic: Negative for immunocompromised state. Neurological:  Negative for dizziness, seizures, syncope and headaches.    Hematological: Does not bruise/bleed easily. Psychiatric/Behavioral:  Negative for confusion. PAST MEDICAL HISTORY     Past Medical History:   Diagnosis Date    Anemia     Depression     Diabetes mellitus ([address]     Hyperlipidemia     Hypertension        SURGICAL HISTORY       Past Surgical History:   Procedure Laterality Date    CHOLECYSTECTOMY      HYSTERECTOMY (CERVIX STATUS UNKNOWN)      TONSILLECTOMY         CURRENT MEDICATIONS     [unfilled]    ALLERGIES     Seasonal    FAMILY HISTORY     History reviewed. No pertinent family history. SOCIAL HISTORY       Social History     Socioeconomic History    Marital status:      Spouse name: None    Number of children: None    Years of education: None    Highest education level: None   Occupational History     Employer: DISABLED   Tobacco Use    Smoking status: Never    Smokeless tobacco: Never   Vaping Use    Vaping Use: Never used   Substance and Sexual Activity    Alcohol use: No    Drug use: No    Sexual activity: Not Currently   Social History Narrative    ** Merged History Encounter **          Social Determinants of Health     Financial Resource Strain: Low Risk     Difficulty of Paying Living Expenses: Not hard at all   Food Insecurity: No Food Insecurity    Worried About [address] in the Last Year: Never true    [address] in the Last Year: Never true       SCREENINGS    [place] Coma Scale  Eye Opening: Spontaneous  Best Verbal Response: Oriented  Best Motor Response: Obeys commands  Misty Coma Scale Score: 15      PHYSICAL EXAM    (up to 7 forlevel 4, 8 or more for level 5)     ED Triage Vitals [12/05/22 1810]   BP Temp Temp src Heart Rate Resp SpO2 Height Weight   (!) 165/118 98.9 °F (37.2 °C) -- (!) 109 22 96 % 5' 4\" (1.626 m) 272 lb (123.4 kg)       Physical Exam  Vitals and nursing note reviewed. Constitutional:       General: She is not in acute distress. Appearance: She is well-developed.    HENT:      Head: Normocephalic and atraumatic. Eyes:      General:         Right eye: No discharge. Left eye: No discharge. Extraocular Movements: Extraocular movements intact. Cardiovascular:      Rate and Rhythm: Normal rate and regular rhythm. Heart sounds: Normal heart sounds. No murmur heard. No friction rub. No gallop. Pulmonary:      Effort: Pulmonary effort is normal. No respiratory distress. Breath sounds: Normal breath sounds. No stridor. No wheezing or rales. Chest:      Chest wall: No tenderness. Abdominal:      General: Bowel sounds are normal. There is no distension. Palpations: Abdomen is soft. There is no mass. Tenderness: There is no abdominal tenderness. There is no guarding or rebound. Musculoskeletal:         General: Tenderness present. Normal range of motion. Cervical back: Normal range of motion and neck supple. Comments: Mild tenderness in the musculature of the lumbar region diffuse and bilaterally. No midline cervical thoracic or lumbar spine tenderness. No skin changes or warmth or erythema. Straight leg raise is negative bilaterally and she has 5 out of 5 strength in bilateral upper and lower extremities   Lymphadenopathy:      Cervical: No cervical adenopathy. Skin:     General: Skin is warm and dry. Findings: No erythema or rash. Neurological:      General: No focal deficit present. Mental Status: She is alert and oriented to person, place, and time. Cranial Nerves: No cranial nerve deficit. Coordination: Coordination normal.   Psychiatric:         Behavior: Behavior normal.         Thought Content: Thought content normal.         Judgment: Judgment normal.       DIAGNOSTIC RESULTS     EKG (Per Emergency Physician):   N/a    RADIOLOGY (Per Emergency Physician):   N/a    Interpretation per the Radiologist below, ifavailable at the time of this note:  No results found.     ED BEDSIDE ULTRASOUND:   Performed by ED Physician - none    LABS:  Labs Reviewed   URINALYSIS - Abnormal; Notable for the following components:       Result Value    Protein, UA 2+ (*)     All other components within normal limits   MICROSCOPIC URINALYSIS - Abnormal; Notable for the following components:    Bacteria, UA RARE (*)     Mucus, UA RARE (*)     All other components within normal limits        All other labs were within normal range or not returned as of this dictation. EMERGENCY DEPARTMENT COURSE and DIFFERENTIALDIAGNOSIS/MDM:   Vitals:    Vitals:    12/05/22 1810   BP: (!) 165/118   Pulse: (!) 109   Resp: 22   Temp: 98.9 °F (37.2 °C)   SpO2: 96%   Weight: 272 lb (123.4 kg)   Height: 5' 4\" (1.626 m)       Medications   acetaminophen (TYLENOL) tablet 650 mg (has no administration in time range)       MDM  . Urinalysis unremarkable for any infection. Patient's pain is localized to the lumbar area, bilateral, mild. No red flags to warrant emergent imaging. I think it is likely muscular strain, she will use Tylenol and ibuprofen as needed at home. Her blood pressure was elevated here so she will continue to recheck that at home and log it and follow-up with her doctor. She will return if any worsening or changing symptoms. REVAL:         CRITICAL CARE TIME   Total Critical Care time was 0 minutes, excluding separatelyreportable procedures. There was a high probability ofclinically significant/life threatening deterioration in the patient's condition which required my urgent intervention. CONSULTS:  [unfilled]    PROCEDURES:  Unless otherwise noted below, none     Procedures    FINAL IMPRESSION      1. Lumbar strain, initial encounter    2.  Essential hypertension          DISPOSITION/PLAN   DISPOSITION Decision To Discharge 12/05/2022 06:58:58 PM      PATIENT REFERRED TO:  BARRY Macias  [facility] [id number]    In 2 days      DISCHARGE MEDICATIONS:  New Prescriptions    No medications on file Summation      Patient Course: Back pain, negative urine, normal neurologic exam, no flank pain. Will use Tylenol and ibuprofen. ED Medications administered this visit:    Medications   acetaminophen (TYLENOL) tablet 650 mg (has no administration in time range)       New Prescriptions from this visit:    New Prescriptions    No medications on file       Follow-up:  BARRY Dunbar  [facility]  [id number]    In 2 days        Final Impression:  1. Lumbar strain, initial encounter    2. Essential hypertension                   (Please note:  Portions of this note were completed with a voicerecognition program.  Efforts were made to edit the dictations but occasionally words and phrases are mis-transcribed.)  Form v2016. J.[id number]    Mason Rudd MD (electronically signed)  Emergency Medicine Provider            Mason Rudd MD  12/05/22 9201

## 2023-01-06 ENCOUNTER — APPOINTMENT (OUTPATIENT)
Dept: CT IMAGING | Age: 53
End: 2023-01-06
Payer: COMMERCIAL

## 2023-01-06 ENCOUNTER — HOSPITAL ENCOUNTER (EMERGENCY)
Age: 53
Discharge: HOME | End: 2023-01-06
Attending: EMERGENCY MEDICINE
Payer: COMMERCIAL

## 2023-01-06 VITALS
TEMPERATURE: 98.3 F | RESPIRATION RATE: 20 BRPM | HEIGHT: 64 IN | HEART RATE: 81 BPM | DIASTOLIC BLOOD PRESSURE: 120 MMHG | SYSTOLIC BLOOD PRESSURE: 178 MMHG | WEIGHT: 268 LBS | OXYGEN SATURATION: 98 % | BODY MASS INDEX: 45.75 KG/M2

## 2023-01-06 DIAGNOSIS — M54.41 ACUTE RIGHT-SIDED LOW BACK PAIN WITH RIGHT-SIDED SCIATICA: Primary | ICD-10-CM

## 2023-01-06 LAB
-: NORMAL
ABSOLUTE EOS #: 0.2 K/UL (ref 0–0.4)
ABSOLUTE LYMPH #: 1.9 K/UL (ref 1–4.8)
ABSOLUTE MONO #: 0.3 K/UL (ref 0–1)
ALBUMIN SERPL-MCNC: 4 G/DL (ref 3.5–5.2)
ALP BLD-CCNC: 119 U/L (ref 35–104)
ALT SERPL-CCNC: 29 U/L (ref 5–33)
ANION GAP SERPL CALCULATED.3IONS-SCNC: 7 MMOL/L (ref 9–17)
AST SERPL-CCNC: 34 U/L
BASOPHILS # BLD: 0 % (ref 0–2)
BASOPHILS ABSOLUTE: 0 K/UL (ref 0–0.2)
BILIRUB SERPL-MCNC: 0.1 MG/DL (ref 0.3–1.2)
BILIRUBIN URINE: NEGATIVE
BUN BLDV-MCNC: 14 MG/DL (ref 6–20)
BUN/CREAT BLD: 23 (ref 9–20)
CALCIUM SERPL-MCNC: 9.1 MG/DL (ref 8.6–10.4)
CHLORIDE BLD-SCNC: 105 MMOL/L (ref 98–107)
CO2: 30 MMOL/L (ref 20–31)
COLOR: YELLOW
COMMENT UA: ABNORMAL
CREAT SERPL-MCNC: 0.62 MG/DL (ref 0.5–0.9)
DIFFERENTIAL TYPE: YES
EOSINOPHILS RELATIVE PERCENT: 3 % (ref 0–5)
EPITHELIAL CELLS UA: NORMAL /HPF
GFR SERPL CREATININE-BSD FRML MDRD: >60 ML/MIN/1.73M2
GLUCOSE BLD-MCNC: 113 MG/DL (ref 70–99)
GLUCOSE URINE: NEGATIVE
HCT VFR BLD CALC: 39.6 % (ref 36–46)
HEMOGLOBIN: 12.7 G/DL (ref 12–16)
KETONES, URINE: NEGATIVE
LEUKOCYTE ESTERASE, URINE: NEGATIVE
LYMPHOCYTES # BLD: 29 % (ref 15–40)
MCH RBC QN AUTO: 24.6 PG (ref 26–34)
MCHC RBC AUTO-ENTMCNC: 32 G/DL (ref 31–37)
MCV RBC AUTO: 76.9 FL (ref 80–100)
MONOCYTES # BLD: 4 % (ref 4–8)
NITRITE, URINE: NEGATIVE
PDW BLD-RTO: 15.5 % (ref 12.1–15.2)
PH UA: 6.5 (ref 5–8)
PLATELET # BLD: 243 K/UL (ref 140–450)
POTASSIUM SERPL-SCNC: 4.6 MMOL/L (ref 3.7–5.3)
PROTEIN UA: ABNORMAL
RBC # BLD: 5.14 M/UL (ref 4–5.2)
RBC UA: NORMAL /HPF (ref 0–2)
SEG NEUTROPHILS: 64 % (ref 47–75)
SEGMENTED NEUTROPHILS ABSOLUTE COUNT: 4.2 K/UL (ref 2.5–7)
SODIUM BLD-SCNC: 142 MMOL/L (ref 135–144)
SPECIFIC GRAVITY UA: 1.01 (ref 1–1.03)
TOTAL PROTEIN: 6.8 G/DL (ref 6.4–8.3)
TURBIDITY: CLEAR
URINE HGB: NEGATIVE
UROBILINOGEN, URINE: NORMAL
WBC # BLD: 6.6 K/UL (ref 3.5–11)
WBC UA: NORMAL /HPF

## 2023-01-06 PROCEDURE — 81001 URINALYSIS AUTO W/SCOPE: CPT

## 2023-01-06 PROCEDURE — 99285 EMERGENCY DEPT VISIT HI MDM: CPT

## 2023-01-06 PROCEDURE — 85025 COMPLETE CBC W/AUTO DIFF WBC: CPT

## 2023-01-06 PROCEDURE — 74177 CT ABD & PELVIS W/CONTRAST: CPT

## 2023-01-06 PROCEDURE — 36415 COLL VENOUS BLD VENIPUNCTURE: CPT

## 2023-01-06 PROCEDURE — 6360000004 HC RX CONTRAST MEDICATION: Performed by: EMERGENCY MEDICINE

## 2023-01-06 PROCEDURE — 80053 COMPREHEN METABOLIC PANEL: CPT

## 2023-01-06 RX ORDER — KETOROLAC TROMETHAMINE 30 MG/ML
30 INJECTION, SOLUTION INTRAMUSCULAR; INTRAVENOUS ONCE
Status: DISCONTINUED | OUTPATIENT
Start: 2023-01-06 | End: 2023-01-06

## 2023-01-06 RX ORDER — 0.9 % SODIUM CHLORIDE 0.9 %
1000 INTRAVENOUS SOLUTION INTRAVENOUS ONCE
Status: DISCONTINUED | OUTPATIENT
Start: 2023-01-06 | End: 2023-01-06

## 2023-01-06 RX ADMIN — IOPAMIDOL 75 ML: 755 INJECTION, SOLUTION INTRAVENOUS at 17:18

## 2023-01-06 ASSESSMENT — PAIN DESCRIPTION - ORIENTATION: ORIENTATION: RIGHT

## 2023-01-06 ASSESSMENT — PAIN DESCRIPTION - FREQUENCY: FREQUENCY: INTERMITTENT

## 2023-01-06 ASSESSMENT — PAIN SCALES - GENERAL: PAINLEVEL_OUTOF10: 3

## 2023-01-06 ASSESSMENT — PAIN - FUNCTIONAL ASSESSMENT: PAIN_FUNCTIONAL_ASSESSMENT: 0-10

## 2023-01-06 ASSESSMENT — PAIN DESCRIPTION - LOCATION: LOCATION: BACK

## 2023-01-06 ASSESSMENT — PAIN DESCRIPTION - PAIN TYPE: TYPE: ACUTE PAIN

## 2023-01-06 ASSESSMENT — PAIN DESCRIPTION - DESCRIPTORS: DESCRIPTORS: ACHING

## 2023-01-06 ASSESSMENT — PAIN DESCRIPTION - ONSET: ONSET: ON-GOING

## 2023-01-06 NOTE — ED PROVIDER NOTES
eMERGENCY dEPARTMENT eNCOUnter        [facility]    Chief Complaint   Patient presents with    Back Pain     Right sided back pain that radiates down back of leg, no injury       HPI    Sd Astorga is a 47 y.o. female who presents to ED from home by EMS. Patient presents with right mid back pain that radiates to her right lower quadrant. Patient also states that the pain that radiates occasionally to her right posterior leg. Patient has history of anemia depression and diabetes. Patient denies injury. She denies dysuria. REVIEW OF SYSTEMS    All systems reviewed and positives are in the HPI.      PAST MEDICAL HISTORY    Past Medical History:   Diagnosis Date    Anemia     Depression     Diabetes mellitus ([address]     Hyperlipidemia     Hypertension        SURGICAL HISTORY    Past Surgical History:   Procedure Laterality Date    CHOLECYSTECTOMY      HYSTERECTOMY (CERVIX STATUS UNKNOWN)      TONSILLECTOMY         CURRENT MEDICATIONS      Current Outpatient Rx   Medication Sig Dispense Refill    metoprolol succinate (TOPROL XL) 25 MG extended release tablet TAKE 1 TABLET BY MOUTH DAILY 30 tablet 5    pramipexole (MIRAPEX) 0.125 MG tablet Take 1 tablet by mouth nightly 14 tablet 0    meloxicam (MOBIC) 15 MG tablet Take 15 mg by mouth daily      cyclobenzaprine (FLEXERIL) 10 MG tablet Take 10 mg by mouth nightly      metFORMIN (GLUCOPHAGE-XR) 500 MG extended release tablet TAKE 1 TABLET BY MOUTH EVERY MORNING WITH BREAKFAST 30 tablet 11    loratadine (CLARITIN) 10 MG tablet TAKE (1) TABLET BY MOUTH ONCE DAILY 90 tablet 1    Multiple Vitamin (MULTIVITAMIN) TABS TAKE 1 TABLET BY MOUTH DAILY 90 tablet 1    simvastatin (ZOCOR) 40 MG tablet TAKE 1 TABLET BY MOUTH NIGHTLY 90 tablet 1    omeprazole (PRILOSEC) 20 MG delayed release capsule TAKE ONE (1) CAPSULE BY MOUTH ONCE DAILY 30 capsule 2    TRUEplus Lancets 28G MISC USE TO TEST BLOOD SUGAR DAILY 100 each 10    sertraline (ZOLOFT) 100 MG tablet TAKE (1) TABLET BY MOUTH ONCE DAILY *EMERGENCY REFILL* 30 tablet 0    lisinopril (PRINIVIL;ZESTRIL) 40 MG tablet TAKE 1 TABLET BY MOUTH DAILY *EMERGENCY REFILL* 30 tablet 0    gabapentin (NEURONTIN) 600 MG tablet TAKE 1 TABLET BY MOUTH TWICE DAILY (Patient not taking: Reported on 1/5/2021) 60 tablet 3    blood glucose monitor strips Check daily. Please dispense true metrix test strips 100 strip 3    Alcohol Swabs PADS Check blood glucose daily 100 each 3    blood glucose monitor kit and supplies Check daily 1 kit 0       ALLERGIES      Allergies   Allergen Reactions    Seasonal        FAMILY HISTORY    History reviewed. No pertinent family history. SOCIAL HISTORY    Social History     Socioeconomic History    Marital status:      Spouse name: None    Number of children: None    Years of education: None    Highest education level: None   Occupational History     Employer: DISABLED   Tobacco Use    Smoking status: Never    Smokeless tobacco: Never   Vaping Use    Vaping Use: Never used   Substance and Sexual Activity    Alcohol use: No    Drug use: No    Sexual activity: Not Currently   Social History Narrative    ** Merged History Encounter **          Social Determinants of Health     Financial Resource Strain: Low Risk     Difficulty of Paying Living Expenses: Not hard at all   Food Insecurity: No Food Insecurity    Worried About [address] in the Last Year: Never true    [address] in the Last Year: Never true       PHYSICAL EXAM    VITAL SIGNS: BP (!) 178/120   Pulse 81   Temp 98.3 °F (36.8 °C) (Oral)   Resp 20   Ht 5' 4\" (1.626 m)   Wt 268 lb (121.6 kg)   LMP  (LMP Unknown)   SpO2 98%   BMI 46.00 kg/m²    Constitutional:  Well developed, well nourished, no acute distress, non-toxic appearance HENT:  Atraumatic, external ears normal, nose normal, oropharynx moist. Neck- normal range of motion, no tenderness, supple   Respiratory:  No respiratory distress, normal breath sounds.    Cardiovascular: Normal rate, normal rhythm, no murmurs, no gallops, no rubs   GI: Right lower quad tenderness, no guarding or rebound, positive bowel sounds   : Positive R  costovertebral angle tenderness   Musculoskeletal:  No edema, no tenderness, no deformities. Back-right flank pain with radiation to the right lower quadrant. Integument:  Well hydrated   Neurologic: Alert and oriented x3 no focal deficit. EKG        RADIOLOGY/PROCEDURES    CT ABDOMEN PELVIS W IV CONTRAST Additional Contrast? None   Final Result      Appendix not separately identified, but no evidence of appendicitis. Small fat-containing umbilical hernia. Hepatic steatosis and hepatomegaly. Slight increase in size of the spleen without marked splenomegaly. Labs  Labs Reviewed   CBC WITH AUTO DIFFERENTIAL - Abnormal; Notable for the following components:       Result Value    MCV 76.9 (*)     MCH 24.6 (*)     RDW 15.5 (*)     All other components within normal limits   COMPREHENSIVE METABOLIC PANEL - Abnormal; Notable for the following components:    Glucose 113 (*)     Bun/Cre Ratio 23 (*)     Anion Gap 7 (*)     Alkaline Phosphatase 119 (*)     AST 34 (*)     Total Bilirubin 0.1 (*)     All other components within normal limits   URINALYSIS - Abnormal; Notable for the following components:    Protein, UA 2+ (*)     All other components within normal limits   MICROSCOPIC URINALYSIS       Procedures      EMERGENCY DEPARTMENT COURSE and DIFFERENTIAL DIAGNOSIS/MDM:       Patient Course: Labs were discussed with the patient. CT abdomen pelvis was ordered to rule out kidney stone/appendicitis. Patient states that she feels better. Patient will be sent home to continue Mobic. Tylenol 650 3 times daily as needed. The warning signs were discussed.   Return to ED if worse  1)  Number and Complexity of Problems  Problem List This Visit:    Problem List Items Addressed This Visit    None  Visit Diagnoses       Acute right-sided low back pain with right-sided sciatica    -  Primary            Differential Diagnosis: Urolithiasis versus appendicitis. 2)  Data Reviewed    Imaging that is independently reviewed and interpreted by me as: Labs and CT findings were discussed with the patient. CT is negative for acute appendicitis. See more data below for the lab and radiology tests and orders. ED Medications administered this visit:    Medications   iopamidol (ISOVUE-370) 76 % injection 75 mL (75 mLs IntraVENous Given 1/6/23 9182)       New Prescriptions from this visit:    New Prescriptions    No medications on file       [facility]  [id number]    In 1 week  Return to ED if worse      Final Impression:   1.  Acute right-sided low back pain with right-sided sciatica               (Please note that portions of this note were completed with a voice recognition program.  Efforts were made to edit the dictations but occasionally words are mis-transcribed.)        Julianna Mallory MD  01/06/23 0838

## 2023-01-06 NOTE — ED NOTES
Pt refuses to get IV, pain medication, Normal Saline. Pt agrees to allow lab to draw for blood work. Dr. Shanthi Florian made aware that patient refuses everything but blood work.         Deirdre Montgomery RN  01/06/23 2068

## 2023-01-06 NOTE — ED NOTES
Ticket to ride completed.  The following information was reported off:  Name  Allergies  Orientation Level  Destination  Safety Issues  Code Status  Oxygen Requirements  Special needs including mobility, language, communication       Mirian Garcia RN  01/06/23 4144

## 2023-04-10 RX ORDER — METOPROLOL SUCCINATE 25 MG/1
25 TABLET, EXTENDED RELEASE ORAL DAILY
Qty: 30 TABLET | Refills: 10 | OUTPATIENT
Start: 2023-04-10

## 2023-04-24 RX ORDER — METOPROLOL SUCCINATE 25 MG/1
25 TABLET, EXTENDED RELEASE ORAL DAILY
Qty: 30 TABLET | Refills: 0 | Status: SHIPPED | OUTPATIENT
Start: 2023-04-24

## 2023-04-24 NOTE — TELEPHONE ENCOUNTER
Last OV 5/22 for URI, shoulder pain  Last OV 4/22 for  chronics  Requesting refill on metoprolol thru sure script  I tried to call patient for OV

## 2023-05-01 ENCOUNTER — HOSPITAL ENCOUNTER (EMERGENCY)
Age: 53
Discharge: HOME | End: 2023-05-01
Attending: EMERGENCY MEDICINE
Payer: COMMERCIAL

## 2023-05-01 ENCOUNTER — APPOINTMENT (OUTPATIENT)
Dept: CT IMAGING | Age: 53
End: 2023-05-01
Payer: COMMERCIAL

## 2023-05-01 VITALS
HEART RATE: 95 BPM | OXYGEN SATURATION: 96 % | HEIGHT: 64 IN | WEIGHT: 275 LBS | SYSTOLIC BLOOD PRESSURE: 164 MMHG | DIASTOLIC BLOOD PRESSURE: 91 MMHG | RESPIRATION RATE: 18 BRPM | BODY MASS INDEX: 46.95 KG/M2 | TEMPERATURE: 98.4 F

## 2023-05-01 DIAGNOSIS — M54.50 ACUTE LOW BACK PAIN WITHOUT SCIATICA, UNSPECIFIED BACK PAIN LATERALITY: Primary | ICD-10-CM

## 2023-05-01 LAB
-: NORMAL
BILIRUBIN URINE: NEGATIVE
COLOR: YELLOW
COMMENT UA: ABNORMAL
EPITHELIAL CELLS UA: NORMAL /HPF
GLUCOSE UR STRIP.AUTO-MCNC: NEGATIVE MG/DL
KETONES UR STRIP.AUTO-MCNC: NEGATIVE MG/DL
LEUKOCYTE ESTERASE UR QL STRIP.AUTO: ABNORMAL
NITRITE UR QL STRIP.AUTO: NEGATIVE
PROT UR STRIP.AUTO-MCNC: 6 MG/DL (ref 5–8)
PROT UR STRIP.AUTO-MCNC: ABNORMAL MG/DL
RBC CLUMPS #/AREA URNS AUTO: NORMAL /HPF (ref 0–2)
SPECIFIC GRAVITY UA: 1.02 (ref 1–1.03)
TURBIDITY: CLEAR
URINE HGB: NEGATIVE
UROBILINOGEN, URINE: ABNORMAL
WBC UA: NORMAL /HPF

## 2023-05-01 PROCEDURE — 87186 SC STD MICRODIL/AGAR DIL: CPT

## 2023-05-01 PROCEDURE — 87086 URINE CULTURE/COLONY COUNT: CPT

## 2023-05-01 PROCEDURE — 87088 URINE BACTERIA CULTURE: CPT

## 2023-05-01 PROCEDURE — 99284 EMERGENCY DEPT VISIT MOD MDM: CPT

## 2023-05-01 PROCEDURE — 74176 CT ABD & PELVIS W/O CONTRAST: CPT

## 2023-05-01 PROCEDURE — 81001 URINALYSIS AUTO W/SCOPE: CPT

## 2023-05-01 RX ORDER — KETOROLAC TROMETHAMINE 30 MG/ML
30 INJECTION, SOLUTION INTRAMUSCULAR; INTRAVENOUS ONCE
Status: DISCONTINUED | OUTPATIENT
Start: 2023-05-01 | End: 2023-05-01

## 2023-05-01 ASSESSMENT — PAIN DESCRIPTION - PAIN TYPE: TYPE: ACUTE PAIN

## 2023-05-01 ASSESSMENT — LIFESTYLE VARIABLES
HOW MANY STANDARD DRINKS CONTAINING ALCOHOL DO YOU HAVE ON A TYPICAL DAY: PATIENT DOES NOT DRINK
HOW OFTEN DO YOU HAVE A DRINK CONTAINING ALCOHOL: NEVER

## 2023-05-01 ASSESSMENT — PAIN DESCRIPTION - ORIENTATION: ORIENTATION: RIGHT

## 2023-05-01 ASSESSMENT — PAIN - FUNCTIONAL ASSESSMENT: PAIN_FUNCTIONAL_ASSESSMENT: 0-10

## 2023-05-01 ASSESSMENT — PAIN DESCRIPTION - LOCATION: LOCATION: FLANK

## 2023-05-01 ASSESSMENT — PAIN SCALES - GENERAL: PAINLEVEL_OUTOF10: 5

## 2023-05-01 ASSESSMENT — PAIN DESCRIPTION - DESCRIPTORS: DESCRIPTORS: SHARP

## 2023-05-01 ASSESSMENT — PAIN DESCRIPTION - FREQUENCY: FREQUENCY: INTERMITTENT

## 2023-05-02 NOTE — ED NOTES
Patient refused Toradol stating that she does not want any shots or IV at this time. Writer spoke with patient regarding plan of care and pain control. Patient stated \"I am good at this time sitting in the chair. I do not need anything for pain. \"   Writer spoke with Dr. Benjamin Maya and updated him.       Jovanny Kasper RN  05/01/23 2002

## 2023-05-02 NOTE — ED NOTES
Ticket to ride completed.  The following information was reported off:  Name  Allergies  Orientation Level  Destination  Safety Issues  Code Status  Oxygen Requirements  Special needs including mobility, language, communication       Dario Carpenter RN  05/01/23 2003

## 2023-05-02 NOTE — ED PROVIDER NOTES
pulses, No edema, No tenderness, No cyanosis, No clubbing. Good range of motion in all major joints. No tenderness to palpation or major deformities noted. Back: Marginal right CVA tenderness. Integument:  Warm, Dry, No erythema, No rash (on exposed areas)   Lymphatic:  No lymphadenopathy noted. Neurologic:  Alert & oriented x 3, Normal motor function, Normal sensory function, No focal deficits noted. Psychiatric:  Affect normal, Judgment normal, Mood normal.     DIAGNOSTIC RESULTS     EKG:     RADIOLOGY:         Interpretation per the Radiologist below, if available at the time of this note:    CT ABDOMEN PELVIS WO CONTRAST Additional Contrast? None   Final Result      1. Diffuse fatty infiltration of the liver with hepatomegaly. 2. Stable mild splenomegaly. 3. Some very mild mosaic perfusion of the lung bases again seen bilaterally    likely sequela of some mild chronic air trapping in conjunction with some small    airway disease. 4. No renal or ureteral calculi identified. No obstructive uropathy. LABS:  Labs Reviewed   URINALYSIS - Abnormal; Notable for the following components:       Result Value    Protein, UA 3+ (*)     Urobilinogen, Urine 8 mg/dL (*)     Leukocyte Esterase, Urine 1+ (*)     All other components within normal limits   CULTURE, URINE   MICROSCOPIC URINALYSIS       All other labs were within normal range or not returned as of this dictation. MIPS  Pulse 95   Temp 98.4 °F (36.9 °C) (Oral)   Resp 18   Ht 5' 4\" (1.626 m)   Wt 275 lb (124.7 kg)   LMP  (LMP Unknown)   SpO2 96%   BMI 41.20 kg/m²   Not applicable      EMERGENCY DEPARTMENT COURSE and DIFFERENTIAL DIAGNOSIS/MDM:   Right flank pain and discomfort. Worried by kidney stone although had a CAT scan back in January. This was negative. Does give account of symptoms of such. She was unable to void.   We did give obtain a CAT scan and urinalysis    1)  Number and Complexity of

## 2023-05-02 NOTE — DISCHARGE INSTRUCTIONS
Take Tylenol or Motrin for pain. You may also try lidocaine patch like Salonpas over-the-counter to the area.   Follow-up with your primary care

## 2023-05-03 LAB
MICROORGANISM SPEC CULT: ABNORMAL
SPECIMEN DESCRIPTION: ABNORMAL

## 2023-05-10 RX ORDER — METOPROLOL SUCCINATE 25 MG/1
25 TABLET, EXTENDED RELEASE ORAL DAILY
Qty: 30 TABLET | Refills: 10 | OUTPATIENT
Start: 2023-05-10

## 2023-05-10 RX ORDER — PRAMIPEXOLE DIHYDROCHLORIDE 0.12 MG/1
TABLET ORAL
Qty: 30 TABLET | Refills: 10 | OUTPATIENT
Start: 2023-05-10

## 2023-05-16 RX ORDER — METOPROLOL SUCCINATE 25 MG/1
25 TABLET, EXTENDED RELEASE ORAL DAILY
Qty: 30 TABLET | Refills: 10 | OUTPATIENT
Start: 2023-05-16

## 2023-05-16 RX ORDER — PRAMIPEXOLE DIHYDROCHLORIDE 0.12 MG/1
TABLET ORAL
Qty: 30 TABLET | Refills: 10 | OUTPATIENT
Start: 2023-05-16

## 2023-05-22 RX ORDER — METOPROLOL SUCCINATE 25 MG/1
25 TABLET, EXTENDED RELEASE ORAL DAILY
Qty: 30 TABLET | Refills: 10 | OUTPATIENT
Start: 2023-05-22

## 2023-05-22 RX ORDER — PRAMIPEXOLE DIHYDROCHLORIDE 0.12 MG/1
TABLET ORAL
Qty: 30 TABLET | Refills: 10 | OUTPATIENT
Start: 2023-05-22

## 2023-05-26 RX ORDER — PRAMIPEXOLE DIHYDROCHLORIDE 0.12 MG/1
TABLET ORAL
Qty: 30 TABLET | Refills: 10 | OUTPATIENT
Start: 2023-05-26

## 2023-05-30 RX ORDER — METOPROLOL SUCCINATE 25 MG/1
25 TABLET, EXTENDED RELEASE ORAL DAILY
Qty: 30 TABLET | Refills: 10 | OUTPATIENT
Start: 2023-05-30

## 2023-06-07 RX ORDER — METOPROLOL SUCCINATE 25 MG/1
25 TABLET, EXTENDED RELEASE ORAL DAILY
Qty: 30 TABLET | Refills: 10 | OUTPATIENT
Start: 2023-06-07

## 2023-06-07 RX ORDER — PRAMIPEXOLE DIHYDROCHLORIDE 0.12 MG/1
TABLET ORAL
Qty: 30 TABLET | Refills: 10 | OUTPATIENT
Start: 2023-06-07

## 2023-06-09 RX ORDER — PRAMIPEXOLE DIHYDROCHLORIDE 0.12 MG/1
TABLET ORAL
Qty: 30 TABLET | Refills: 10 | OUTPATIENT
Start: 2023-06-09

## 2023-06-21 RX ORDER — PRAMIPEXOLE DIHYDROCHLORIDE 0.12 MG/1
TABLET ORAL
Qty: 30 TABLET | Refills: 10 | OUTPATIENT
Start: 2023-06-21

## 2023-06-27 RX ORDER — PRAMIPEXOLE DIHYDROCHLORIDE 0.12 MG/1
TABLET ORAL
Qty: 30 TABLET | Refills: 10 | OUTPATIENT
Start: 2023-06-27

## 2023-06-28 RX ORDER — PRAMIPEXOLE DIHYDROCHLORIDE 0.12 MG/1
TABLET ORAL
Qty: 30 TABLET | Refills: 10 | OUTPATIENT
Start: 2023-06-28

## 2023-07-13 RX ORDER — PRAMIPEXOLE DIHYDROCHLORIDE 0.12 MG/1
TABLET ORAL
Qty: 30 TABLET | Refills: 10 | OUTPATIENT
Start: 2023-07-13

## 2023-07-20 RX ORDER — PRAMIPEXOLE DIHYDROCHLORIDE 0.12 MG/1
TABLET ORAL
Qty: 30 TABLET | Refills: 10 | OUTPATIENT
Start: 2023-07-20

## 2023-07-26 RX ORDER — PRAMIPEXOLE DIHYDROCHLORIDE 0.12 MG/1
TABLET ORAL
Qty: 30 TABLET | Refills: 10 | OUTPATIENT
Start: 2023-07-26

## 2023-08-01 RX ORDER — PRAMIPEXOLE DIHYDROCHLORIDE 0.12 MG/1
TABLET ORAL
Qty: 30 TABLET | Refills: 10 | OUTPATIENT
Start: 2023-08-01

## 2023-08-07 RX ORDER — PRAMIPEXOLE DIHYDROCHLORIDE 0.12 MG/1
TABLET ORAL
Qty: 30 TABLET | Refills: 10 | OUTPATIENT
Start: 2023-08-07

## 2023-08-14 RX ORDER — PRAMIPEXOLE DIHYDROCHLORIDE 0.12 MG/1
TABLET ORAL
Qty: 30 TABLET | Refills: 10 | OUTPATIENT
Start: 2023-08-14

## 2023-08-21 RX ORDER — PRAMIPEXOLE DIHYDROCHLORIDE 0.12 MG/1
TABLET ORAL
Qty: 30 TABLET | Refills: 10 | OUTPATIENT
Start: 2023-08-21

## 2023-08-28 RX ORDER — PRAMIPEXOLE DIHYDROCHLORIDE 0.12 MG/1
TABLET ORAL
Qty: 30 TABLET | Refills: 10 | OUTPATIENT
Start: 2023-08-28

## 2023-09-05 RX ORDER — PRAMIPEXOLE DIHYDROCHLORIDE 0.12 MG/1
TABLET ORAL
Qty: 30 TABLET | Refills: 10 | OUTPATIENT
Start: 2023-09-05

## 2023-09-05 RX ORDER — METOPROLOL SUCCINATE 25 MG/1
25 TABLET, EXTENDED RELEASE ORAL DAILY
Qty: 30 TABLET | Refills: 10 | OUTPATIENT
Start: 2023-09-05

## 2023-10-20 ENCOUNTER — APPOINTMENT (OUTPATIENT)
Dept: GENERAL RADIOLOGY | Age: 53
End: 2023-10-20
Payer: COMMERCIAL

## 2023-10-20 ENCOUNTER — HOSPITAL ENCOUNTER (EMERGENCY)
Age: 53
Discharge: HOME | End: 2023-10-20
Attending: FAMILY MEDICINE
Payer: COMMERCIAL

## 2023-10-20 VITALS
RESPIRATION RATE: 18 BRPM | SYSTOLIC BLOOD PRESSURE: 138 MMHG | BODY MASS INDEX: 47.2 KG/M2 | HEART RATE: 111 BPM | HEIGHT: 64 IN | DIASTOLIC BLOOD PRESSURE: 91 MMHG | TEMPERATURE: 99.5 F | OXYGEN SATURATION: 97 %

## 2023-10-20 DIAGNOSIS — R79.89 ELEVATED D-DIMER: ICD-10-CM

## 2023-10-20 DIAGNOSIS — R00.0 SINUS TACHYCARDIA: Primary | ICD-10-CM

## 2023-10-20 LAB
ANION GAP SERPL CALCULATED.3IONS-SCNC: 8 MMOL/L (ref 9–17)
BASOPHILS # BLD: 0.02 K/UL (ref 0–0.2)
BASOPHILS NFR BLD: 0 % (ref 0–2)
BUN SERPL-MCNC: 14 MG/DL (ref 6–20)
BUN/CREAT SERPL: 18 (ref 9–20)
CALCIUM SERPL-MCNC: 9.5 MG/DL (ref 8.6–10.4)
CHLORIDE SERPL-SCNC: 104 MMOL/L (ref 98–107)
CO2 SERPL-SCNC: 29 MMOL/L (ref 20–31)
CREAT SERPL-MCNC: 0.8 MG/DL (ref 0.5–0.9)
D DIMER PPP FEU-MCNC: 0.7 UG/ML FEU (ref 0–0.59)
EKG ATRIAL RATE: 119 BPM
EKG P AXIS: 71 DEGREES
EKG P-R INTERVAL: 150 MS
EKG Q-T INTERVAL: 332 MS
EKG QRS DURATION: 92 MS
EKG QTC CALCULATION (BAZETT): 467 MS
EKG R AXIS: 2 DEGREES
EKG T AXIS: 92 DEGREES
EKG VENTRICULAR RATE: 119 BPM
EOSINOPHIL # BLD: 0.26 K/UL (ref 0–0.4)
EOSINOPHILS RELATIVE PERCENT: 3 % (ref 0–5)
ERYTHROCYTE [DISTWIDTH] IN BLOOD BY AUTOMATED COUNT: 14.7 % (ref 12.1–15.2)
ETHANOL PERCENT: <0.01 %
ETHANOLAMINE SERPL-MCNC: <10 MG/DL
GFR SERPL CREATININE-BSD FRML MDRD: >60 ML/MIN/1.73M2
GLUCOSE BLD-MCNC: 121 MG/DL (ref 65–99)
GLUCOSE SERPL-MCNC: 132 MG/DL (ref 70–99)
HCT VFR BLD AUTO: 44.1 % (ref 36–46)
HGB BLD-MCNC: 13.3 G/DL (ref 12–16)
IMM GRANULOCYTES # BLD AUTO: 0.02 K/UL (ref 0–0.3)
IMM GRANULOCYTES NFR BLD: 0 % (ref 0–5)
LYMPHOCYTES NFR BLD: 3.15 K/UL (ref 1–4.8)
LYMPHOCYTES RELATIVE PERCENT: 38 % (ref 15–40)
MCH RBC QN AUTO: 23.8 PG (ref 26–34)
MCHC RBC AUTO-ENTMCNC: 30.2 G/DL (ref 31–37)
MCV RBC AUTO: 78.9 FL (ref 80–100)
MONOCYTES NFR BLD: 0.38 K/UL (ref 0–1)
MONOCYTES NFR BLD: 5 % (ref 4–8)
NEUTROPHILS NFR BLD: 54 % (ref 47–75)
NEUTS SEG NFR BLD: 4.39 K/UL (ref 2.5–7)
PLATELET # BLD AUTO: 209 K/UL (ref 140–450)
PMV BLD AUTO: 9.7 FL (ref 6–12)
POTASSIUM SERPL-SCNC: 4.6 MMOL/L (ref 3.7–5.3)
RBC # BLD AUTO: 5.59 M/UL (ref 4–5.2)
SODIUM SERPL-SCNC: 141 MMOL/L (ref 135–144)
TROPONIN I SERPL HS-MCNC: 9 NG/L (ref 0–14)
TSH SERPL DL<=0.05 MIU/L-ACNC: 1.3 UIU/ML (ref 0.3–5)
WBC OTHER # BLD: 8.2 K/UL (ref 3.5–11)

## 2023-10-20 PROCEDURE — 85025 COMPLETE CBC W/AUTO DIFF WBC: CPT

## 2023-10-20 PROCEDURE — 71045 X-RAY EXAM CHEST 1 VIEW: CPT

## 2023-10-20 PROCEDURE — G0480 DRUG TEST DEF 1-7 CLASSES: HCPCS

## 2023-10-20 PROCEDURE — 80048 BASIC METABOLIC PNL TOTAL CA: CPT

## 2023-10-20 PROCEDURE — 84443 ASSAY THYROID STIM HORMONE: CPT

## 2023-10-20 PROCEDURE — 82947 ASSAY GLUCOSE BLOOD QUANT: CPT

## 2023-10-20 PROCEDURE — 84484 ASSAY OF TROPONIN QUANT: CPT

## 2023-10-20 PROCEDURE — 99285 EMERGENCY DEPT VISIT HI MDM: CPT

## 2023-10-20 PROCEDURE — 36415 COLL VENOUS BLD VENIPUNCTURE: CPT

## 2023-10-20 PROCEDURE — 85379 FIBRIN DEGRADATION QUANT: CPT

## 2023-10-20 RX ORDER — MELOXICAM 15 MG/1
15 TABLET ORAL
COMMUNITY
Start: 2022-09-15

## 2023-10-20 RX ORDER — MULTIVIT-MINERALS/FOLIC/GINKGO 400MCG-120
1 TABLET ORAL DAILY
COMMUNITY
Start: 2023-08-08

## 2023-10-20 RX ORDER — SIMVASTATIN 80 MG
80 TABLET ORAL
COMMUNITY
Start: 2023-08-08

## 2023-10-20 RX ORDER — METFORMIN HYDROCHLORIDE EXTENDED-RELEASE TABLETS 500 MG/1
TABLET, FILM COATED, EXTENDED RELEASE ORAL
COMMUNITY
Start: 2023-08-08 | End: 2023-10-20

## 2023-10-20 ASSESSMENT — PAIN - FUNCTIONAL ASSESSMENT: PAIN_FUNCTIONAL_ASSESSMENT: NONE - DENIES PAIN

## 2023-10-21 ASSESSMENT — ENCOUNTER SYMPTOMS
SHORTNESS OF BREATH: 0
CHEST TIGHTNESS: 0

## 2023-10-21 NOTE — ED PROVIDER NOTES
[address]      Pt Name: Nataliya Garcia  MRN: [id number]  Birthdate 1970  Date of evaluation: 10/20/2023  Provider: Thania Beckman MD    CHIEF COMPLAINT       Chief Complaint   Patient presents with    Illness     States she does not feel right. States that she started sweating tonight and felt that her lips turned blue. HISTORY OF PRESENT ILLNESS      Nataliya Garcia is a 46 y.o. female who presents to the emergency department via EMS from home, patient states yesterday and again today, she had episodes where she was very diaphoretic, felt that her fingers and lips were getting blue, she did not think anything of it yesterday so denies not coming, however is again started today she called the ambulance to bring her to the hospital.  Patient states \"I do not feel right\". Patient denies any drug use, does states she had 3 cans of soda earlier, denies any cardiac history denies any history of blood clots. Denies swelling her legs or pain in her calves. Denies abdominal pain. States patient did appear somewhat diaphoretic, sinus tach on the monitor in the 130s, they did not appreciate any cyanosis of patient on their arrival.  Patient refusing IV. PCP: Ruth Ann Mcclendon        REVIEW OF SYSTEMS       Review of Systems   Constitutional:  Positive for diaphoresis. Respiratory:  Negative for chest tightness and shortness of breath. Cardiovascular:  Negative for chest pain, palpitations and leg swelling. All other systems reviewed and are negative.         PAST MEDICAL HISTORY     Past Medical History:   Diagnosis Date    Anemia     Depression     Diabetes mellitus ([address])     Hyperlipidemia     Hypertension          SURGICAL HISTORY       Past Surgical History:   Procedure Laterality Date    CHOLECYSTECTOMY      HYSTERECTOMY (CERVIX STATUS UNKNOWN)      TONSILLECTOMY           CURRENT MEDICATIONS       Discharge Medication List as of 10/20/2023 10:44 PM

## 2023-10-21 NOTE — DISCHARGE INSTRUCTIONS
Discussed her elevated heart rate, elevated D-dimer needing of this test is used in the ER, concern for potential for blood clot and potential risk of not detecting it in treating immediately, including death. Please keep your stated appointment with Dr. Asim Neumann next week, if any symptoms change or other concerns, including chest pain or chest tightness, shortness of breath or pain with deep inspiration, or any other concern, return to the nearest emergency room. If you change your mind about getting a CTA here tonight, as you refused an IV despite concerns above, be at 5 minutes 5 hours or 5 days, we are always open and available to help you here.

## 2023-10-23 LAB
EKG ATRIAL RATE: 119 BPM
EKG P AXIS: 71 DEGREES
EKG P-R INTERVAL: 150 MS
EKG Q-T INTERVAL: 332 MS
EKG QRS DURATION: 92 MS
EKG QTC CALCULATION (BAZETT): 467 MS
EKG R AXIS: 2 DEGREES
EKG T AXIS: 92 DEGREES
EKG VENTRICULAR RATE: 119 BPM

## 2023-11-07 ENCOUNTER — HOSPITAL ENCOUNTER (EMERGENCY)
Age: 53
Discharge: HOME | End: 2023-11-07
Attending: EMERGENCY MEDICINE
Payer: COMMERCIAL

## 2023-11-07 VITALS
HEART RATE: 134 BPM | RESPIRATION RATE: 20 BRPM | HEIGHT: 64 IN | WEIGHT: 279.8 LBS | BODY MASS INDEX: 47.77 KG/M2 | SYSTOLIC BLOOD PRESSURE: 179 MMHG | TEMPERATURE: 99.5 F | DIASTOLIC BLOOD PRESSURE: 116 MMHG | OXYGEN SATURATION: 96 %

## 2023-11-07 DIAGNOSIS — J06.9 ACUTE UPPER RESPIRATORY INFECTION: Primary | ICD-10-CM

## 2023-11-07 PROCEDURE — 99283 EMERGENCY DEPT VISIT LOW MDM: CPT

## 2023-11-07 RX ORDER — ACETAMINOPHEN 500 MG
500 TABLET ORAL 4 TIMES DAILY PRN
Qty: 120 TABLET | Refills: 0 | Status: SHIPPED | OUTPATIENT
Start: 2023-11-07

## 2023-11-07 ASSESSMENT — ENCOUNTER SYMPTOMS
CHEST TIGHTNESS: 0
EYE DISCHARGE: 0
FACIAL SWELLING: 0
WHEEZING: 0
STRIDOR: 0

## 2023-11-07 ASSESSMENT — PAIN DESCRIPTION - DESCRIPTORS: DESCRIPTORS: STABBING

## 2023-11-07 ASSESSMENT — LIFESTYLE VARIABLES
HOW OFTEN DO YOU HAVE A DRINK CONTAINING ALCOHOL: NEVER
HOW MANY STANDARD DRINKS CONTAINING ALCOHOL DO YOU HAVE ON A TYPICAL DAY: PATIENT DOES NOT DRINK

## 2023-11-07 ASSESSMENT — PAIN SCALES - GENERAL: PAINLEVEL_OUTOF10: 5

## 2023-11-07 ASSESSMENT — PAIN DESCRIPTION - LOCATION: LOCATION: THROAT

## 2023-11-07 ASSESSMENT — PAIN - FUNCTIONAL ASSESSMENT: PAIN_FUNCTIONAL_ASSESSMENT: 0-10

## 2023-11-07 ASSESSMENT — PAIN DESCRIPTION - FREQUENCY: FREQUENCY: INTERMITTENT

## 2023-11-07 NOTE — ED PROVIDER NOTES
PROCEDURES:  Unless otherwise noted below, none     Procedures    FINAL IMPRESSION      1.  Acute upper respiratory infection          DISPOSITION/PLAN     DISPOSITION Decision To Discharge 11/07/2023 06:43:10 PM      PATIENT REFERRED TO:  Marino Issa MD  [facility] [facility] [phone]    In 1 week        DISCHARGE MEDICATIONS:  New Prescriptions    ACETAMINOPHEN (TYLENOL) 500 MG TABLET    Take 1 tablet by mouth 4 times daily as needed for Pain       (Please note that portions of this note were completed with a voice recognition program.  Efforts were made to edit the dictations but occasionally words are mis-transcribed.)    Monse Sandoval MD (electronically signed)  Attending Emergency Physician            Monse Sandoval MD  11/07/23 9928

## 2023-11-15 ENCOUNTER — APPOINTMENT (OUTPATIENT)
Dept: PRIMARY CARE | Facility: CLINIC | Age: 53
End: 2023-11-15
Payer: COMMERCIAL

## 2024-03-07 ENCOUNTER — HOSPITAL ENCOUNTER (OUTPATIENT)
Dept: PHYSICAL THERAPY | Age: 54
Setting detail: THERAPIES SERIES
Discharge: HOME | End: 2024-03-07
Payer: COMMERCIAL

## 2024-03-07 PROCEDURE — 97162 PT EVAL MOD COMPLEX 30 MIN: CPT

## 2024-03-07 ASSESSMENT — PAIN SCALES - GENERAL: PAINLEVEL_OUTOF10: 4

## 2024-03-07 NOTE — PLAN OF CARE
[facility]       Phone: [phone]   Date: 3/7/2024                      Outpatient Physical Therapy  Fax: [phone]    ACCT #: [id number]                     Plan of Care  [facility]#: [id number]  Patient: Jazzy Jaramillo  : 1970    Referring Practitioner: Shelli URBAN  Referring Provider (secondary): Shelli BELCHER    Diagnosis: Lumbar pain with sciatica  Onset Date: 24  Treatment Diagnosis: Lumbar pain with radiculopathy    Assessment  Assessment: Patient presents with sudden onset of lumbar pain with RLE radiculopathy suggestive of  nerve impingement.  Plan to educate and progress with stretching and stab ex along with modalities as needed for swelling and inflammation reduction  Therapy Prognosis: Good    Treatment Plan :   Days: 2 times per week Weeks: 8 weeks      Patient Education/HEP, Therapeutic Exercise, Manual Therapy, Traction, HP/CP, and Electrical Stimulation     Goals  Time Frame for Short Term Goals: 4 visits  Short Term Goal 1: Educate on home program for trunk and hip stretches and trunk stab ex    Time Frame for Long Term Goals : 12 visits  Long Term Goal 1: Decrease subjective lumbar pain to <2/10 with ambulation and daily tasks  Long Term Goal 2: Decrease RLE radicular symptoms by > 50%  Long Term Goal 3: Ambulate without deviation or guarded movement with transfers  Long Term Goal 4: Oswestry <15/50 to indicate improved functional mobility     NERIS GOFF PT   Date: 3/7/2024    ______________________________________ Date: 3/7/2024  Physician Signature  By signing above or cosigning electronically, I have reviewed this Plan of Care and certify a need for medically necessary rehabilitation services.

## 2024-03-07 NOTE — THERAPY EVALUATION
Phone: [phone]                       [facility]         Fax: [phone]                      Outpatient Physical Therapy                                                                      Evaluation    Date: 3/7/2024  Patient: Jazzy Jaramillo  : 1970  ACCT #: [id number]    Referring Practitioner: Shelli URBAN  Referring Provider (secondary): Shelli BELCHER    Diagnosis: Lumbar pain with sciatica    Treatment Diagnosis: Lumbar pain with radiculopathy  Onset Date: 24  PT Insurance Information: [employer]    Per Physician Order  Total # of Visits to Date: 1  No Show: 0  Canceled Appointment: 0     Subjective     Additional Pertinent Hx: Sudden onset of lumbar pain without etiology.  Pain radiates distally down RLE.   Worse in morning; ok with sitting but has difficulty with sit to stand and walking.  Follow up with MD .  Attempting household tasks but is limited by pain; able t osit down adnf releive pain. Oswestry = 22/50  PMHx inclues DM, HTN  Pain Assessment  Pain Level: 4     IADL History  Active : No  Occupation: Unemployed    Objective  Vision  Vision: Within Functional Limits  Hearing  Hearing: Within functional limits  Observation/Palpation  Posture: Fair  Palpation: Moderate tenderness of right SI/gluteal region  Observation: labored movement  with sit to stand and with ambulation  Spine  Lumbar: Forward flex limited 50%; SB limited with discomfort  Strength RLE  Strength RLE: WFL  Comment: 4/5 knee extension  AROM RLE (degrees)  RLE General AROM: Limited terminal knee ext in sitting;  difficulty to lie supine due to discomfort or to attempt assessing right hip ROM  Strength LLE  Strength LLE: WNL  AROM LLE (degrees)  LLE AROM : WFL       AROM RLE (degrees)  RLE General AROM: Limited terminal knee ext in sitting;  difficulty to lie supine due to discomfort or to attempt assessing right hip ROM  AROM LLE (degrees)  LLE AROM : WFL

## 2024-03-11 ENCOUNTER — APPOINTMENT (OUTPATIENT)
Dept: PHYSICAL THERAPY | Age: 54
End: 2024-03-11
Payer: COMMERCIAL

## 2024-03-13 ENCOUNTER — HOSPITAL ENCOUNTER (OUTPATIENT)
Dept: PHYSICAL THERAPY | Age: 54
Setting detail: THERAPIES SERIES
Discharge: HOME | End: 2024-03-13
Payer: COMMERCIAL

## 2024-03-13 PROCEDURE — 97110 THERAPEUTIC EXERCISES: CPT

## 2024-03-13 PROCEDURE — G0283 ELEC STIM OTHER THAN WOUND: HCPCS

## 2024-03-13 ASSESSMENT — PAIN SCALES - GENERAL: PAINLEVEL_OUTOF10: 6

## 2024-03-13 NOTE — PROGRESS NOTES
Phone: [phone]                 [facility]      Fax: [phone]                            Outpatient Physical Therapy                                                                            Daily Note    Date: 3/13/2024  Patient Name: Jazzy Jaramillo        MRN: [id number]   ACCT#:  [id number]  : 1970  (54 y.o.)    Referring Provider (secondary): Shelli BELCHER         Diagnosis: Lumbar pain with sciatica  Treatment Diagnosis: Lumbar pain with radiculopathy    Onset Date: 24  PT Insurance Information: [employer]  Total # of Visits Approved: 12 Per Physician Order  Total # of Visits to Date: 2  No Show: 0  Canceled Appointment: 0  Plan of Care/Certification Expiration Date: 24    Pre-Treatment Pain:  6/10     Assessment  Assessment: Pt reports relief until she got home after last visit. Initiated gentle ex for strength and flexibility. On Nustep pt stopped at 45 sec d/t saying it was too painful.After as rest she was able to cont completing a total of 4 min. Concluded session with HP/IFES to bilateral SI area d/t pt c/o pain across both sides . Will cont and progress ex as anibal.    Plan  Continue with current plan of care    Exercises/Modalities/Manual:  See DocFlow Sheet    Education: new ex technique          Goals  (Total # of Visits to Date: 2)   Short Term Goals  Time Frame for Short Term Goals: 4 visits  Short Term Goal 1: Educate on home program for trunk and hip stretches and trunk stab ex    Long Term Goals  Time Frame for Long Term Goals : 12 visits  Long Term Goal 1: Decrease subjective lumbar pain to <2/10 with ambulation and daily tasks  Long Term Goal 2: Decrease RLE radicular symptoms by > 50%  Long Term Goal 3: Ambulate without deviation or guarded movement with transfers  Long Term Goal 4: Oswestry <15/50 to indicate improved functional mobility    Post Treatment Pain:  0/10    Time In: 1330    Time Out : 1415        Timed Code Treatment Minutes: 25  Total

## 2024-03-14 ENCOUNTER — APPOINTMENT (OUTPATIENT)
Dept: PHYSICAL THERAPY | Age: 54
End: 2024-03-14
Payer: COMMERCIAL

## 2024-03-18 ENCOUNTER — HOSPITAL ENCOUNTER (OUTPATIENT)
Dept: PHYSICAL THERAPY | Age: 54
Setting detail: THERAPIES SERIES
Discharge: HOME | End: 2024-03-18
Payer: COMMERCIAL

## 2024-03-18 PROCEDURE — 97140 MANUAL THERAPY 1/> REGIONS: CPT

## 2024-03-18 PROCEDURE — 97110 THERAPEUTIC EXERCISES: CPT

## 2024-03-18 ASSESSMENT — PAIN SCALES - GENERAL: PAINLEVEL_OUTOF10: 6

## 2024-03-18 NOTE — PROGRESS NOTES
Phone: [phone]                 [facility]      Fax: [phone]                            Outpatient Physical Therapy                                                                            Daily Note    Date: 3/18/2024  Patient Name: Jazzy Jaramillo        MRN: [id number]   ACCT#:  [id number]  : 1970  (54 y.o.)    Referring Provider (secondary): Shelli BELCHER         Diagnosis: Lumbar pain with sciatica  Treatment Diagnosis: Lumbar pain with radiculopathy    Onset Date: 24  PT Insurance Information: [employer]  Total # of Visits Approved: 12 Per Physician Order  Total # of Visits to Date: 3  No Show: 0  Canceled Appointment: 0  Plan of Care/Certification Expiration Date: 24    Pre-Treatment Pain:  6/10     Assessment  Assessment: Pt reports relief after last visit until she got home.  Per PT consult performed ex as outlined followed by long leg distraction. Good anibal to gentle LLD , no change in pain after session. Will monitor.    Plan  Continue with current plan of care    Exercises/Modalities/Manual:  See DocFlow Sheet    Education: New manual technique LLD          Goals  (Total # of Visits to Date: 3)   Short Term Goals  Time Frame for Short Term Goals: 4 visits  Short Term Goal 1: Educate on home program for trunk and hip stretches and trunk stab ex    Long Term Goals  Time Frame for Long Term Goals : 12 visits  Long Term Goal 1: Decrease subjective lumbar pain to <2/10 with ambulation and daily tasks  Long Term Goal 2: Decrease RLE radicular symptoms by > 50%  Long Term Goal 3: Ambulate without deviation or guarded movement with transfers  Long Term Goal 4: Oswestry <15/50 to indicate improved functional mobility    Post Treatment Pain:  6/10    Time In: 1330    Time Out : 1405      Timed and total 35 min    Marlena Maguire PTA    Date: 3/18/2024

## 2024-03-25 ENCOUNTER — HOSPITAL ENCOUNTER (OUTPATIENT)
Dept: PHYSICAL THERAPY | Age: 54
Setting detail: THERAPIES SERIES
Discharge: HOME | End: 2024-03-25
Payer: COMMERCIAL

## 2024-03-25 PROCEDURE — 97110 THERAPEUTIC EXERCISES: CPT

## 2024-03-25 PROCEDURE — 97140 MANUAL THERAPY 1/> REGIONS: CPT

## 2024-03-25 NOTE — PROGRESS NOTES
Phone: [phone]                 [facility]      Fax: [phone]                            Outpatient Physical Therapy                                                                            Daily Note    Date: 3/25/2024  Patient Name: Jazzy Jaramillo        MRN: [id number]   ACCT#:  [id number]  : 1970  (54 y.o.)    Referring Provider (secondary): Shelli BELCHER         Diagnosis: Lumbar pain with sciatica  Treatment Diagnosis: Lumbar pain with radiculopathy    Onset Date: 24  PT Insurance Information: [employer]  Total # of Visits Approved: 12 Per Physician Order  Total # of Visits to Date: 4  No Show: 0  Canceled Appointment: 0  Plan of Care/Certification Expiration Date: 24    Pre-Treatment Pain:  3/10     Assessment  Assessment: Patient reports good tolerance to last session with initiation of long leg distraction and overall reports decreased radicular pain however has limited her activity level.  Initiated HS 90 and piriformis stretch and manual therapy consisting of pelvic rocking/distraction.  Tightness with gluteal soft tissue.  Will monitor and progress with trunk stretching/stab and hip strengthening and consider pelvic traction    Plan  Continue with current plan of care    Exercises/Modalities/Manual:  See DocFlow Sheet    Education: Piriformis stretch           Goals  (Total # of Visits to Date: 4)   Short Term Goals  Time Frame for Short Term Goals: 4 visits  Short Term Goal 1: Educate on home program for trunk and hip stretches and trunk stab ex- MET    Long Term Goals  Time Frame for Long Term Goals : 12 visits  Long Term Goal 1: Decrease subjective lumbar pain to <2/10 with ambulation and daily tasks  Long Term Goal 2: Decrease RLE radicular symptoms by > 50%  Long Term Goal 3: Ambulate without deviation or guarded movement with transfers  Long Term Goal 4: Oswestry <15/50 to indicate improved functional mobility    Post Treatment Pain:  3/10    Time In:

## 2024-04-05 ENCOUNTER — HOSPITAL ENCOUNTER (OUTPATIENT)
Dept: PHYSICAL THERAPY | Age: 54
Setting detail: THERAPIES SERIES
Discharge: HOME | End: 2024-04-05
Payer: COMMERCIAL

## 2024-04-05 PROCEDURE — 97110 THERAPEUTIC EXERCISES: CPT

## 2024-04-05 PROCEDURE — 97012 MECHANICAL TRACTION THERAPY: CPT

## 2024-04-05 NOTE — PROGRESS NOTES
Phone: [phone]                 [facility]      Fax: [phone]                            Outpatient Physical Therapy                                                                            Daily Note    Date: 2024  Patient Name: Jazzy Jaramillo        MRN: [id number]   ACCT#:  [id number]  : 1970  (54 y.o.)    Referring Provider (secondary): Shelli BELCHER         Diagnosis: Lumbar pain with sciatica  Treatment Diagnosis: Lumbar pain with radiculopathy    Onset Date: 24  PT Insurance Information: [employer]  Total # of Visits Approved: 12 Per Physician Order  Total # of Visits to Date: 5  No Show: 0  Canceled Appointment: 0  Plan of Care/Certification Expiration Date: 24    Pre-Treatment Pain:  5/10     Assessment  Assessment: Patient reports short term pain reduction following last treatment with manual therapy.  Continues to note right sided LS pain with RLE radicular pain.  Modified to static pelvic traction @ 60# x 10min for lumbar decompression; will monitor status.    Plan  Continue with current plan of care    Exercises/Modalities/Manual:  See DocFlow Sheet    Education: Benefits and purpose of traction           Goals  (Total # of Visits to Date: 5)   Short Term Goals  Time Frame for Short Term Goals: 4 visits  Short Term Goal 1: Educate on home program for trunk and hip stretches and trunk stab ex- MET    Long Term Goals  Time Frame for Long Term Goals : 12 visits  Long Term Goal 1: Decrease subjective lumbar pain to <2/10 with ambulation and daily tasks  Long Term Goal 2: Decrease RLE radicular symptoms by > 50%  Long Term Goal 3: Ambulate without deviation or guarded movement with transfers  Long Term Goal 4: Oswestry <15/50 to indicate improved functional mobility    Post Treatment Pain:  5/10    Time In: 1300    Time Out : 1345        Timed Code Treatment Minutes: 25 Minutes    Total Fernando:45 Minutes    NERIS GOFF PT     Date: 2024

## 2024-04-12 ENCOUNTER — HOSPITAL ENCOUNTER (OUTPATIENT)
Dept: PHYSICAL THERAPY | Age: 54
Setting detail: THERAPIES SERIES
Discharge: HOME | End: 2024-04-12
Payer: COMMERCIAL

## 2024-04-12 PROCEDURE — 97012 MECHANICAL TRACTION THERAPY: CPT

## 2024-04-12 NOTE — PROGRESS NOTES
Phone: [phone]                 [facility]      Fax: [phone]                            Outpatient Physical Therapy                                                                            Daily Note    Date: 2024  Patient Name: Jazzy Jaramillo        MRN: [id number]   ACCT#:  [id number]  : 1970  (54 y.o.)    Referring Provider (secondary): Shelli BELCHER         Diagnosis: Lumbar pain with sciatica  Treatment Diagnosis: Lumbar pain with radiculopathy    Onset Date: 24  PT Insurance Information: [employer]  Total # of Visits Approved: 12 Per Physician Order  Total # of Visits to Date: 6  No Show: 0  Canceled Appointment: 0  Plan of Care/Certification Expiration Date: 24    Pre-Treatment Pain:  0/10 lumbar pain     Assessment  Assessment: Patient arrived late due to van transportation.  Due to limited time, completed only  pelvic traction @ 70# x 13 min.  Patient reports no low back pain and only reports generalized B LE discomfort.  Will monitor    Plan  Continue with current plan of care    Exercises/Modalities/Manual:  See DocFlow Sheet    Education: Pelvic traction           Goals  (Total # of Visits to Date: 6)   Short Term Goals  Time Frame for Short Term Goals: 4 visits  Short Term Goal 1: Educate on home program for trunk and hip stretches and trunk stab ex- MET    Long Term Goals  Time Frame for Long Term Goals : 12 visits  Long Term Goal 1: Decrease subjective lumbar pain to <2/10 with ambulation and daily tasks  Long Term Goal 2: Decrease RLE radicular symptoms by > 50%  Long Term Goal 3: Ambulate without deviation or guarded movement with transfers  Long Term Goal 4: Oswestry <15/50 to indicate improved functional mobility    Post Treatment Pain:  0/10    Time In: 1325    Time Out : 1355        Timed Code Treatment Minutes: 30 Minutes    Minutes    NERIS GOFF PT    Date: 2024

## 2024-04-22 ENCOUNTER — HOSPITAL ENCOUNTER (OUTPATIENT)
Dept: PHYSICAL THERAPY | Age: 54
Setting detail: THERAPIES SERIES
Discharge: HOME | End: 2024-04-22
Payer: COMMERCIAL

## 2024-04-22 PROCEDURE — 97012 MECHANICAL TRACTION THERAPY: CPT

## 2024-04-22 ASSESSMENT — PAIN SCALES - GENERAL: PAINLEVEL_OUTOF10: 0

## 2024-04-22 NOTE — PROGRESS NOTES
Phone: [phone]                 [facility]      Fax: [phone]                            Outpatient Physical Therapy                                                                            Daily Note    Date: 2024  Patient Name: Jazzy Jaramillo        MRN: [id number]   ACCT#:  [id number]  : 1970  (54 y.o.)    Referring Provider (secondary): Shelli BELCHER         Diagnosis: Lumbar pain with sciatica  Treatment Diagnosis: Lumbar pain with radiculopathy    Onset Date: 24  PT Insurance Information: [employer]  Total # of Visits Approved: 12 Per Physician Order  Total # of Visits to Date: 7  No Show: 0  Canceled Appointment: 0  Plan of Care/Certification Expiration Date: 24    Pre-Treatment Pain:  0/10     Assessment  Assessment: Patient arrives noting no lumbar or leg pain this date.  Emphasis on pelvic traction @ 75-80#.   Will monitor status and will decrease frequency as status permits.    Plan  Continue with current plan of care    Exercises/Modalities/Manual:  See DocFlow Sheet    Education: Plan as status permits          Goals  (Total # of Visits to Date: 7)   Short Term Goals  Time Frame for Short Term Goals: 4 visits  Short Term Goal 1: Educate on home program for trunk and hip stretches and trunk stab ex- MET    Long Term Goals  Time Frame for Long Term Goals : 12 visits  Long Term Goal 1: Decrease subjective lumbar pain to <2/10 with ambulation and daily tasks  Long Term Goal 2: Decrease RLE radicular symptoms by > 50%  Long Term Goal 3: Ambulate without deviation or guarded movement with transfers  Long Term Goal 4: Oswestry <15/50 to indicate improved functional mobility    Post Treatment Pain:  0/10    Time In: 1345    Time Out : 1420        Timed Code Treatment Minutes: 0 Minutes    Total Time:  35 Minutes    NERIS GOFF PT     Date: 2024

## 2024-05-11 ENCOUNTER — HOSPITAL ENCOUNTER (EMERGENCY)
Age: 54
Discharge: HOME | End: 2024-05-11
Attending: FAMILY MEDICINE
Payer: COMMERCIAL

## 2024-05-11 VITALS
SYSTOLIC BLOOD PRESSURE: 157 MMHG | BODY MASS INDEX: 46.88 KG/M2 | HEART RATE: 102 BPM | TEMPERATURE: 98.1 F | RESPIRATION RATE: 18 BRPM | WEIGHT: 274.6 LBS | OXYGEN SATURATION: 96 % | HEIGHT: 64 IN | DIASTOLIC BLOOD PRESSURE: 113 MMHG

## 2024-05-11 DIAGNOSIS — J01.00 ACUTE NON-RECURRENT MAXILLARY SINUSITIS: Primary | ICD-10-CM

## 2024-05-11 PROCEDURE — 99283 EMERGENCY DEPT VISIT LOW MDM: CPT

## 2024-05-11 PROCEDURE — 6370000000 HC RX 637 (ALT 250 FOR IP): Performed by: FAMILY MEDICINE

## 2024-05-11 RX ORDER — DOXYCYCLINE HYCLATE 100 MG/1
100 CAPSULE ORAL 2 TIMES DAILY
Qty: 20 CAPSULE | Refills: 0 | Status: SHIPPED | OUTPATIENT
Start: 2024-05-11 | End: 2024-05-21

## 2024-05-11 RX ORDER — DOXYCYCLINE HYCLATE 100 MG
100 TABLET ORAL ONCE
Status: COMPLETED | OUTPATIENT
Start: 2024-05-11 | End: 2024-05-11

## 2024-05-11 RX ADMIN — DOXYCYCLINE HYCLATE 100 MG: 100 TABLET, COATED ORAL at 20:17

## 2024-05-11 ASSESSMENT — PAIN DESCRIPTION - ORIENTATION: ORIENTATION: ANTERIOR

## 2024-05-11 ASSESSMENT — PAIN DESCRIPTION - DESCRIPTORS: DESCRIPTORS: PRESSURE;THROBBING

## 2024-05-11 ASSESSMENT — PAIN SCALES - GENERAL: PAINLEVEL_OUTOF10: 8

## 2024-05-11 ASSESSMENT — PAIN - FUNCTIONAL ASSESSMENT: PAIN_FUNCTIONAL_ASSESSMENT: 0-10

## 2024-05-11 ASSESSMENT — PAIN DESCRIPTION - LOCATION: LOCATION: HEAD

## 2024-05-11 ASSESSMENT — PAIN DESCRIPTION - PAIN TYPE: TYPE: ACUTE PAIN

## 2024-05-12 NOTE — ED PROVIDER NOTES
eMERGENCY dEPARTMENT eNCOUnter        CHIEF COMPLAINT    Chief Complaint   Patient presents with    Illness     C/o head congestion x4 days, states her eyes  are burning and ears are ringing.       HPI    Jazzy Jaramillo is a 54 y.o. female who presents to ED with sinus symptoms for 2 weeks.  Symptoms have waxed and waned.  Symptoms have worsened in the past 4 days.  Patient describes bilateral maxillary sinus pain with nasal congestion and sinus pressure.  She has had discolored nasal drainage with this.  Patient has not taken anything for her symptoms.  Patient does have a history of diabetes.    REVIEW OF SYSTEMS    All systems reviewed and positives are in the HPI      PAST MEDICAL HISTORY    Past Medical History:   Diagnosis Date    Anemia     Depression     Diabetes mellitus (HCC)     Hyperlipidemia     Hypertension        SURGICAL HISTORY    Past Surgical History:   Procedure Laterality Date    CHOLECYSTECTOMY      HYSTERECTOMY (CERVIX STATUS UNKNOWN)      TONSILLECTOMY         CURRENT MEDICATIONS    Current Outpatient Rx   Medication Sig Dispense Refill    doxycycline hyclate (VIBRAMYCIN) 100 MG capsule Take 1 capsule by mouth 2 times daily for 10 days 20 capsule 0    acetaminophen (TYLENOL) 500 MG tablet Take 1 tablet by mouth 4 times daily as needed for Pain 120 tablet 0    meloxicam (MOBIC) 15 MG tablet Take 1 tablet by mouth      Multiple Vitamins-Minerals (GNP ONE DAILY WOMENS 50+) TABS Take 1 tablet by mouth daily      metoprolol succinate (TOPROL XL) 25 MG extended release tablet TAKE 1 TABLET BY MOUTH DAILY 30 tablet 0    pramipexole (MIRAPEX) 0.125 MG tablet Take 1 tablet by mouth nightly 14 tablet 0    cyclobenzaprine (FLEXERIL) 10 MG tablet Take 1 tablet by mouth nightly      metFORMIN (GLUCOPHAGE-XR) 500 MG extended release tablet TAKE 1 TABLET BY MOUTH EVERY MORNING WITH BREAKFAST 30 tablet 11    loratadine (CLARITIN) 10 MG tablet TAKE (1) TABLET BY MOUTH ONCE DAILY 90 tablet 1

## 2024-05-12 NOTE — ED NOTES
Patient blood pressure elevated while in department.  notified. Patient advised to follow-up with family doctor and monitor blood pressure, per . Patient discharged. Discharge instructions reviewed with patient and education regarding care of condition provided.

## 2024-05-14 ENCOUNTER — HOSPITAL ENCOUNTER (EMERGENCY)
Age: 54
Discharge: HOME | End: 2024-05-14
Attending: EMERGENCY MEDICINE
Payer: COMMERCIAL

## 2024-05-14 VITALS
RESPIRATION RATE: 19 BRPM | WEIGHT: 272 LBS | HEIGHT: 64 IN | OXYGEN SATURATION: 98 % | SYSTOLIC BLOOD PRESSURE: 172 MMHG | HEART RATE: 92 BPM | BODY MASS INDEX: 46.44 KG/M2 | TEMPERATURE: 97.7 F | DIASTOLIC BLOOD PRESSURE: 100 MMHG

## 2024-05-14 DIAGNOSIS — K11.21 PAROTITIS, ACUTE: Primary | ICD-10-CM

## 2024-05-14 PROCEDURE — 99283 EMERGENCY DEPT VISIT LOW MDM: CPT

## 2024-05-14 RX ORDER — IBUPROFEN 800 MG/1
800 TABLET ORAL EVERY 6 HOURS PRN
Qty: 20 TABLET | Refills: 0 | Status: SHIPPED | OUTPATIENT
Start: 2024-05-14

## 2024-05-14 ASSESSMENT — PAIN SCALES - GENERAL: PAINLEVEL_OUTOF10: 8

## 2024-05-14 ASSESSMENT — PAIN DESCRIPTION - DESCRIPTORS: DESCRIPTORS: ACHING

## 2024-05-14 ASSESSMENT — PAIN DESCRIPTION - LOCATION: LOCATION: JAW

## 2024-05-14 ASSESSMENT — PAIN - FUNCTIONAL ASSESSMENT: PAIN_FUNCTIONAL_ASSESSMENT: 0-10

## 2024-05-14 ASSESSMENT — PAIN DESCRIPTION - PAIN TYPE: TYPE: ACUTE PAIN

## 2024-05-14 NOTE — DISCHARGE INSTRUCTIONS
Continue doxycycline as prescribed.  Use over-the-counter Flonase and Afrin for nasal congestion symptoms.  Use ibuprofen as needed for pain.  Use Lemonheads to promote secretion from the parotid gland that is swollen and inflamed and tender.

## 2024-05-14 NOTE — ED PROVIDER NOTES
EMERGENCY DEPARTMENT ENCOUNTER      CHIEF COMPLAINT    Chief Complaint   Patient presents with    Sinus Problem     Sinus pressure to right and left ear. Denies fevers.        HPI    Jazzy Jaramillo is a 54 y.o. female who presents to the emergency room for evaluation of right-sided facial swelling and pain.  She was seen here 3 days ago for sinus infection and prescribed doxycycline which she has been taking.  She states she started with the facial swelling and pain yesterday and it worsened this morning.  No other associated symptoms.      PAST MEDICAL HISTORY    Past Medical History:   Diagnosis Date    Anemia     Depression     Diabetes mellitus (HCC)     Hyperlipidemia     Hypertension        SURGICAL HISTORY    Past Surgical History:   Procedure Laterality Date    CHOLECYSTECTOMY      HYSTERECTOMY (CERVIX STATUS UNKNOWN)      TONSILLECTOMY         CURRENT MEDICATIONS    Current Outpatient Rx   Medication Sig Dispense Refill    ibuprofen (ADVIL;MOTRIN) 800 MG tablet Take 1 tablet by mouth every 6 hours as needed for Pain 20 tablet 0    doxycycline hyclate (VIBRAMYCIN) 100 MG capsule Take 1 capsule by mouth 2 times daily for 10 days 20 capsule 0    acetaminophen (TYLENOL) 500 MG tablet Take 1 tablet by mouth 4 times daily as needed for Pain 120 tablet 0    meloxicam (MOBIC) 15 MG tablet Take 1 tablet by mouth      Multiple Vitamins-Minerals (GNP ONE DAILY WOMENS 50+) TABS Take 1 tablet by mouth daily      metoprolol succinate (TOPROL XL) 25 MG extended release tablet TAKE 1 TABLET BY MOUTH DAILY 30 tablet 0    pramipexole (MIRAPEX) 0.125 MG tablet Take 1 tablet by mouth nightly 14 tablet 0    cyclobenzaprine (FLEXERIL) 10 MG tablet Take 1 tablet by mouth nightly      metFORMIN (GLUCOPHAGE-XR) 500 MG extended release tablet TAKE 1 TABLET BY MOUTH EVERY MORNING WITH BREAKFAST 30 tablet 11    loratadine (CLARITIN) 10 MG tablet TAKE (1) TABLET BY MOUTH ONCE DAILY 90 tablet 1    simvastatin (ZOCOR) 40 MG tablet

## 2024-07-11 ENCOUNTER — HOSPITAL ENCOUNTER (EMERGENCY)
Age: 54
Discharge: HOME | End: 2024-07-11
Attending: EMERGENCY MEDICINE
Payer: COMMERCIAL

## 2024-07-11 VITALS
WEIGHT: 273 LBS | HEART RATE: 100 BPM | BODY MASS INDEX: 50.24 KG/M2 | HEIGHT: 62 IN | SYSTOLIC BLOOD PRESSURE: 161 MMHG | OXYGEN SATURATION: 95 % | DIASTOLIC BLOOD PRESSURE: 90 MMHG | RESPIRATION RATE: 18 BRPM | TEMPERATURE: 98.6 F

## 2024-07-11 DIAGNOSIS — S39.012A STRAIN OF LUMBAR REGION, INITIAL ENCOUNTER: Primary | ICD-10-CM

## 2024-07-11 DIAGNOSIS — M54.16 LUMBAR RADICULOPATHY: ICD-10-CM

## 2024-07-11 PROCEDURE — 99283 EMERGENCY DEPT VISIT LOW MDM: CPT

## 2024-07-11 PROCEDURE — 6370000000 HC RX 637 (ALT 250 FOR IP): Performed by: EMERGENCY MEDICINE

## 2024-07-11 RX ORDER — METHYLPREDNISOLONE 4 MG/1
TABLET ORAL
Qty: 1 KIT | Refills: 0 | Status: SHIPPED | OUTPATIENT
Start: 2024-07-11

## 2024-07-11 RX ORDER — LIDOCAINE 4 G/G
1 PATCH TOPICAL DAILY
Qty: 30 PATCH | Refills: 0 | Status: SHIPPED | OUTPATIENT
Start: 2024-07-11 | End: 2024-08-10

## 2024-07-11 RX ORDER — LIDOCAINE 4 G/G
1 PATCH TOPICAL DAILY
Status: DISCONTINUED | OUTPATIENT
Start: 2024-07-11 | End: 2024-07-11 | Stop reason: HOSPADM

## 2024-07-11 ASSESSMENT — PAIN DESCRIPTION - FREQUENCY: FREQUENCY: INTERMITTENT

## 2024-07-11 ASSESSMENT — PAIN - FUNCTIONAL ASSESSMENT: PAIN_FUNCTIONAL_ASSESSMENT: 0-10

## 2024-07-11 ASSESSMENT — PAIN DESCRIPTION - ORIENTATION: ORIENTATION: RIGHT

## 2024-07-11 ASSESSMENT — PAIN DESCRIPTION - LOCATION: LOCATION: BACK

## 2024-07-11 ASSESSMENT — PAIN DESCRIPTION - PAIN TYPE: TYPE: ACUTE PAIN

## 2024-07-11 ASSESSMENT — PAIN DESCRIPTION - DESCRIPTORS: DESCRIPTORS: STABBING

## 2024-07-11 ASSESSMENT — PAIN SCALES - GENERAL: PAINLEVEL_OUTOF10: 5

## 2024-07-11 NOTE — ED PROVIDER NOTES
[facility] ED  eMERGENCY dEPARTMENT eNCOUnter      Pt Name: Jazzy Jaramillo  MRN: [id number]  Birthdate 1970  Date of evaluation: 7/11/2024  Provider: Danny Parra MD    CHIEF COMPLAINT       Chief Complaint   Patient presents with    Back Pain     Patient reports right sided back pain, back pain started on Tuesday, has tried OTC meds, heating pad, ice with no relief. Patient denies trauma/injury to that area.     Patient is a 54-year-old female who presents to the emergency department complaining of right back pain.  She states she has right low back pain that radiates into her buttock and slightly down her leg.  She denies any fever or chills.  She denies any loss of bowel or bladder control.  She denies any weakness.  She states it started about 3 days ago.  She denies any trauma or injury that she knows of.        Nursing Notes were reviewed.    REVIEW OF SYSTEMS    (2-9 systems for level 4, 10 or more for level 5)     Review of Systems    Except as noted above the remainder of the review of systems was reviewed and negative.       PAST MEDICAL HISTORY     Past Medical History:   Diagnosis Date    Anemia     Depression     Diabetes mellitus (HCC)     Hyperlipidemia     Hypertension          SURGICAL HISTORY       Past Surgical History:   Procedure Laterality Date    CHOLECYSTECTOMY      HYSTERECTOMY (CERVIX STATUS UNKNOWN)      TONSILLECTOMY           ALLERGIES     Seasonal    FAMILY HISTORY     No family history on file.       SOCIAL HISTORY       Social History     Socioeconomic History    Marital status:    Occupational History     Employer: DISABLED   Tobacco Use    Smoking status: Never    Smokeless tobacco: Never   Vaping Use    Vaping Use: Never used   Substance and Sexual Activity    Alcohol use: No    Drug use: No    Sexual activity: Not Currently   Social History Narrative    ** Merged History Encounter **          Social Determinants of Health     Financial Resource Strain: Low    Vitals:    07/11/24 1800   BP: (!) 161/90   Pulse: 100   Resp: 18   Temp: 98.6 °F (37 °C)   TempSrc: Oral   SpO2: 95%   Weight: 123.8 kg (273 lb)   Height: 1.575 m (5' 2\")                 REASSESSMENT      Here in the ED I discussed with patient and at this point I do not think she needs x-rays and she recently had them done as an outpatient.  I advised her we will start her on a Medrol Dosepak and give her lidocaine patch and have her follow-up with her primary care team for further workup if not improving.  Patient is comfortable with this and will return if any change in symptoms.    PROCEDURES:  Unless otherwise noted below, none     Procedures    FINAL IMPRESSION      1. Strain of lumbar region, initial encounter    2. Lumbar radiculopathy          DISPOSITION/PLAN   DISPOSITION Decision To Discharge 07/11/2024 06:13:28 PM      PATIENT REFERRED TO:  Travis Perez MD  [address] [place] [address]  [phone]    In 2 days        DISCHARGE MEDICATIONS:  New Prescriptions    LIDOCAINE 4 % EXTERNAL PATCH    Place 1 patch onto the skin daily    METHYLPREDNISOLONE (MEDROL, TAVON,) 4 MG TABLET    Take by mouth.          (Please note that portions ofthis note were completed with a voice recognition program.  Efforts were made to edit the dictations but occasionally words are mis-transcribed.)    Danny Parra MD(electronically signed)  Attending Emergency Physician           Danny Parra MD  07/11/24 0808

## 2024-09-28 ENCOUNTER — HOSPITAL ENCOUNTER (EMERGENCY)
Age: 54
Discharge: HOME | End: 2024-09-28
Attending: FAMILY MEDICINE
Payer: COMMERCIAL

## 2024-09-28 VITALS
WEIGHT: 274.7 LBS | OXYGEN SATURATION: 95 % | HEART RATE: 116 BPM | BODY MASS INDEX: 46.9 KG/M2 | DIASTOLIC BLOOD PRESSURE: 123 MMHG | TEMPERATURE: 98.1 F | RESPIRATION RATE: 18 BRPM | HEIGHT: 64 IN | SYSTOLIC BLOOD PRESSURE: 161 MMHG

## 2024-09-28 DIAGNOSIS — M54.10 RADICULAR PAIN OF RIGHT LOWER EXTREMITY: Primary | ICD-10-CM

## 2024-09-28 PROCEDURE — 99283 EMERGENCY DEPT VISIT LOW MDM: CPT

## 2024-09-28 PROCEDURE — 6360000002 HC RX W HCPCS: Performed by: FAMILY MEDICINE

## 2024-09-28 PROCEDURE — 6370000000 HC RX 637 (ALT 250 FOR IP): Performed by: FAMILY MEDICINE

## 2024-09-28 RX ORDER — GABAPENTIN 100 MG/1
200 CAPSULE ORAL ONCE
Status: COMPLETED | OUTPATIENT
Start: 2024-09-28 | End: 2024-09-28

## 2024-09-28 RX ORDER — PREDNISONE 20 MG/1
60 TABLET ORAL DAILY
Qty: 10 TABLET | Refills: 0 | Status: SHIPPED | OUTPATIENT
Start: 2024-09-28 | End: 2024-10-03

## 2024-09-28 RX ORDER — DEXAMETHASONE SODIUM PHOSPHATE 10 MG/ML
10 INJECTION, SOLUTION INTRAMUSCULAR; INTRAVENOUS ONCE
Status: COMPLETED | OUTPATIENT
Start: 2024-09-28 | End: 2024-09-28

## 2024-09-28 RX ORDER — GABAPENTIN 100 MG/1
100 CAPSULE ORAL 2 TIMES DAILY
Qty: 30 CAPSULE | Refills: 1 | Status: SHIPPED | OUTPATIENT
Start: 2024-09-28 | End: 2024-10-13

## 2024-09-28 RX ADMIN — DEXAMETHASONE SODIUM PHOSPHATE 10 MG: 10 INJECTION INTRAMUSCULAR; INTRAVENOUS at 18:44

## 2024-09-28 RX ADMIN — GABAPENTIN 200 MG: 100 CAPSULE ORAL at 18:44

## 2024-09-28 ASSESSMENT — PAIN DESCRIPTION - DESCRIPTORS: DESCRIPTORS: ACHING

## 2024-09-28 ASSESSMENT — PAIN SCALES - GENERAL: PAINLEVEL_OUTOF10: 3

## 2024-09-28 ASSESSMENT — PAIN - FUNCTIONAL ASSESSMENT: PAIN_FUNCTIONAL_ASSESSMENT: ACTIVITIES ARE NOT PREVENTED

## 2024-09-28 ASSESSMENT — PAIN DESCRIPTION - FREQUENCY: FREQUENCY: CONTINUOUS

## 2024-09-28 ASSESSMENT — PAIN DESCRIPTION - LOCATION: LOCATION: LEG

## 2024-09-28 ASSESSMENT — PAIN DESCRIPTION - PAIN TYPE: TYPE: ACUTE PAIN

## 2024-09-28 ASSESSMENT — PAIN DESCRIPTION - ORIENTATION: ORIENTATION: RIGHT

## 2024-09-29 NOTE — ED PROVIDER NOTES
Temp Temp Source Pulse Respirations SpO2   (!) 161/123 -- -- 98.1 °F (36.7 °C) Oral (!) 116 18 95 %      Height Weight - Scale         1.626 m (5' 4\") 124.6 kg (274 lb 11.2 oz)             Physical Exam  Physical Exam   Constitutional: Patient is oriented to person, place, and time. Patient appears well-developed and well-nourished. Patient is active and cooperative.      Neck: Full passive range of motion without pain and phonation normal.   Cardiovascular:  Normal rate, regular rhythm and intact distal pulses.     Pulses: Right radial pulse  2+   Pulmonary/Chest: Effort normal. No tachypnea and no bradypnea.   Abdominal: BMI 47.1  Musculoskeletal:   Right straight leg test negative, patient able articulate within the hip joint without difficulty    Left straight leg test was negative, patient was some discomfort with articulation within the hip joint with rotation    Except is otherwise noted, negative acute trauma or deformity,  apparent full range of motion and normal strength all extremities appropriate to age.  Neurological: Patient is alert and oriented to person, place, and time. patient displays no tremor. Patient displays no seizure activity.  Straight leg test negative bilaterally..  Skin: Skin is warm and dry. Patient is not diaphoretic.  Psychiatric: Patient has a normal mood and affect. Patient speech is normal and behavior is normal. Cognition and memory are normal.     DIAGNOSTIC RESULTS         Interpretation per the Radiologist below, if available at the time of this note:    No orders to display         LABS:  Labs Reviewed - No data to display    All other labs were within normal range or not returned as of this dictation.      MIPS    Not applicable      EMERGENCY DEPARTMENT COURSE and DIFFERENTIAL DIAGNOSIS/MDM:     Patient history and physical exam taken at bedside, discussed patient's symptoms and exam findings, discussed's pain may be more of a radiculopathy, it is in an L5 or S1

## 2024-10-06 ENCOUNTER — APPOINTMENT (OUTPATIENT)
Dept: GENERAL RADIOLOGY | Age: 54
End: 2024-10-06
Payer: COMMERCIAL

## 2024-10-06 ENCOUNTER — HOSPITAL ENCOUNTER (EMERGENCY)
Age: 54
Discharge: HOME | End: 2024-10-06
Attending: EMERGENCY MEDICINE
Payer: COMMERCIAL

## 2024-10-06 VITALS
OXYGEN SATURATION: 96 % | WEIGHT: 271 LBS | BODY MASS INDEX: 46.26 KG/M2 | HEART RATE: 96 BPM | HEIGHT: 64 IN | TEMPERATURE: 98.8 F | DIASTOLIC BLOOD PRESSURE: 100 MMHG | SYSTOLIC BLOOD PRESSURE: 170 MMHG | RESPIRATION RATE: 20 BRPM

## 2024-10-06 DIAGNOSIS — J06.9 VIRAL URI WITH COUGH: Primary | ICD-10-CM

## 2024-10-06 LAB
SARS-COV-2 RDRP RESP QL NAA+PROBE: NOT DETECTED
SPECIMEN DESCRIPTION: NORMAL

## 2024-10-06 PROCEDURE — 99284 EMERGENCY DEPT VISIT MOD MDM: CPT

## 2024-10-06 PROCEDURE — 71046 X-RAY EXAM CHEST 2 VIEWS: CPT

## 2024-10-06 PROCEDURE — 87635 SARS-COV-2 COVID-19 AMP PRB: CPT

## 2024-10-06 ASSESSMENT — PAIN - FUNCTIONAL ASSESSMENT: PAIN_FUNCTIONAL_ASSESSMENT: NONE - DENIES PAIN

## 2024-10-06 NOTE — ED PROVIDER NOTES
[facility] ED  eMERGENCY dEPARTMENT eNCOUnter      Pt Name: Jazzy Jaramillo  MRN: [id number]  Birthdate 1970  Date of evaluation: 10/6/2024  Provider: Danny Parra MD    CHIEF COMPLAINT       Chief Complaint   Patient presents with    Cough     Coughing started Monday. Sneezing and watery eyes. Patient thinks its her allergies. Deep cough in chest and back.      Patient is a 54-year-old female who presents to the emergency department complaining of cough and congestion.  She states she has had sneezing and watery eyes and thinks it is likely her allergies but wants to make sure it is not anything else.  She states that she has not had any fever or chills.  She denies any chest pain or abdominal pain.  She denies other associated symptoms.        Nursing Notes were reviewed.    REVIEW OF SYSTEMS    (2-9 systems for level 4, 10 or more for level 5)     Review of Systems    Except as noted above the remainder of the review of systems was reviewed and negative.       PAST MEDICAL HISTORY     Past Medical History:   Diagnosis Date    Anemia     Depression     Diabetes mellitus (HCC)     Hyperlipidemia     Hypertension          SURGICAL HISTORY       Past Surgical History:   Procedure Laterality Date    CHOLECYSTECTOMY      HYSTERECTOMY (CERVIX STATUS UNKNOWN)      TONSILLECTOMY           ALLERGIES     Seasonal    FAMILY HISTORY     History reviewed. No pertinent family history.       SOCIAL HISTORY       Social History     Socioeconomic History    Marital status:      Spouse name: None    Number of children: None    Years of education: None    Highest education level: None   Occupational History     Employer: DISABLED   Tobacco Use    Smoking status: Never    Smokeless tobacco: Never   Vaping Use    Vaping status: Never Used   Substance and Sexual Activity    Alcohol use: No    Drug use: No    Sexual activity: Not Currently   Social History Narrative    ** Merged History Encounter **

## 2024-10-18 ENCOUNTER — HOSPITAL ENCOUNTER (OUTPATIENT)
Dept: GENERAL RADIOLOGY | Age: 54
Discharge: HOME | End: 2024-10-20
Payer: COMMERCIAL

## 2024-10-18 ENCOUNTER — HOSPITAL ENCOUNTER (OUTPATIENT)
Age: 54
Discharge: HOME | End: 2024-10-20
Payer: COMMERCIAL

## 2024-10-18 DIAGNOSIS — M54.50 LOW BACK PAIN RADIATING TO RIGHT LEG: ICD-10-CM

## 2024-10-18 DIAGNOSIS — M79.604 LOW BACK PAIN RADIATING TO RIGHT LEG: ICD-10-CM

## 2024-10-18 PROCEDURE — 72110 X-RAY EXAM L-2 SPINE 4/>VWS: CPT

## 2024-12-08 NOTE — PATIENT INSTRUCTIONS
SURVEY:    You may be receiving a survey from [employer] regarding your visit today. Please complete the survey to enable us to provide the highest quality of care to you and your family. If you cannot score us a very good (5 Stars) on any question, please call the office to discuss how we could have made your experience a very good one. Thank you.     Clinical Care Team: TOSHA Mejia-ALEXEY Medina LPN    Clerical Team: Tess Mays
pulse oximetry

## 2024-12-18 ENCOUNTER — HOSPITAL ENCOUNTER (OUTPATIENT)
Dept: PHYSICAL THERAPY | Age: 54
Setting detail: THERAPIES SERIES
Discharge: HOME | End: 2024-12-18
Payer: COMMERCIAL

## 2024-12-18 PROCEDURE — 97161 PT EVAL LOW COMPLEX 20 MIN: CPT

## 2024-12-18 ASSESSMENT — PAIN SCALES - GENERAL: PAINLEVEL_OUTOF10: 6

## 2024-12-19 NOTE — THERAPY EVALUATION
Phone: [phone]                       [facility]         Fax: [phone]                      Outpatient Physical Therapy                                                                      Evaluation    Date: 2024  Patient: Jazzy Jaramillo  : 1970  ACCT #: [id number]    Referring Provider: Shelli BELCHER    Diagnosis: Lumbar pain    Treatment Diagnosis: Lumbar pain with radiculopathy  Onset Date: 24  PT Insurance Information: [place]    Per Physician Order  Total # of Visits to Date: 1  No Show: 0  Canceled Appointment: 0     Subjective     Additional Pertinent Hx: Chronic LBP with recent exacerbation in October.  Notes increased pain with standing/walking and household chores. Notes right LE radicular pain extending to lateral calf which is intermittent depending on postion or activity..  Oswestry = 14/50.  PMHx includes HTN, DM  Pain Assessment  Pain Level: 6     IADL History  Active : No  Occupation: On disability  Leisure & Hobbies: Attempts daily household tasks but is limited with standing and takes frequent breaks to accomodate increasing lumbar and RLE radicular pain    Objective  Vision  Vision: Within Functional Limits  Hearing  Hearing: Within functional limits  Observation/Palpation  Posture: Good  Palpation: Moderate tenderness B SI region and across lumbar region  Spine  Lumbar: Able to forward flex to touch lower leg but notes increased discomfort upon extension movements.  SB and rotation WFL without pain  Strength RLE  Comment: No LE myotomal weakness  AROM RLE (degrees)  RLE AROM: WFL  RLE General AROM: Mild hip tightness into IR  Strength LLE  Comment: No LE myotomal weakness  AROM LLE (degrees)  LLE AROM : WFL  LLE General AROM: Mild hip tightness into IR       AROM RLE (degrees)  RLE AROM: WFL  RLE General AROM: Mild hip tightness into IR  AROM LLE (degrees)  LLE AROM : WFL  LLE General AROM: Mild hip tightness into IR

## 2024-12-19 NOTE — PLAN OF CARE
[facility]       Phone: [phone]   Date: 2024                      Outpatient Physical Therapy  Fax: [phone]    ACCT #: [id number]                     Plan of Care  [facility]#: [id number]  Patient: Jazzy Jaramillo  : 1970    Referring Provider : Shelli BELCHER      Diagnosis: Lumbar pain  Onset Date: 24    Treatment Diagnosis: Lumbar pain with radiculopathy    Assessment  Assessment: Patient presents with lumbar pain and RLE radiculopathy suggestive of nerve impingement and/or spinal stenosis  Therapy Prognosis: Good    Treatment Plan :  Days: 2 times per week Weeks: 8 weeks      Patient Education/HEP, Therapeutic Exercise, Neuro Re-ed, and Traction     Goals  Time Frame for Short Term Goals: 5 visits  Short Term Goal 1: Educate on home exercise program for stretching and strengthening    Time Frame for Long Term Goals : 12 visits  Long Term Goal 1: Decrease subjective lumbar pain to <4/10 with daily household tasks and functional ambulation  Long Term Goal 2: Decrease RLE radicular pain by > 50% in severity and frequency  Long Term Goal 3: Trunk mobility WFL with minimal discomfort to complete household tasks  Long Term Goal 4: Functionally be able to stand to complete full meal prep or dishes     NERIS GOFF PT   Date: 2024    ______________________________________ Date: 2024  Physician Signature  By signing above or cosigning electronically, I have reviewed this Plan of Care and certify a need for medically necessary rehabilitation services.

## 2024-12-30 ENCOUNTER — HOSPITAL ENCOUNTER (OUTPATIENT)
Dept: PHYSICAL THERAPY | Age: 54
Setting detail: THERAPIES SERIES
Discharge: HOME | End: 2024-12-30
Payer: COMMERCIAL

## 2024-12-30 PROCEDURE — 97012 MECHANICAL TRACTION THERAPY: CPT

## 2024-12-30 ASSESSMENT — PAIN SCALES - GENERAL: PAINLEVEL_OUTOF10: 8

## 2024-12-30 NOTE — PROGRESS NOTES
Phone: [phone]                 [facility]      Fax: [phone]                            Outpatient Physical Therapy                                                                            Daily Note    Date: 2024  Patient Name: Jazzy Jaramillo        MRN: [id number]   ACCT#:  [id number]  : 1970  (54 y.o.)    Referring Provider (secondary): Shelli BELCHER         Diagnosis: Lumbar pain  Treatment Diagnosis: Lumbar pain with radiculopathy    Onset Date: 24  PT Insurance Information: [place]    Per Physician Order  Total # of Visits to Date: 2  No Show: 0  Canceled Appointment: 0  Plan of Care/Certification Expiration Date: 25    Pre-Treatment Pain:  8/10     Assessment  Assessment: Patient states back pain is an 8/10 this morning. Patient states she had a long car ride home from vacation and that is most likely what caused the increase in pain. Initiated lumbar traction as outlined. During traction patient reports not feeling pull. Advised patient it is pulling and we have to start low and gradually progress overtime. Patient attempted NuStep at end of session, but was only able to tolerate for 30 seconds before increasing R LE pain. Will continue with lumbar traction and attempt to add gym ex per patient tolerance.    Plan  Continue with current plan of care    Exercises/Modalities/Manual:  See DocFlow Sheet    Education: Traction     Goals  (Total # of Visits to Date: 2)   Short Term Goals  Time Frame for Short Term Goals: 5 visits  Short Term Goal 1: Educate on home exercise program for stretching and strengthening    Long Term Goals  Time Frame for Long Term Goals : 12 visits  Long Term Goal 1: Decrease subjective lumbar pain to <4/10 with daily household tasks and functional ambulation  Long Term Goal 2: Decrease RLE radicular pain by > 50% in severity and frequency  Long Term Goal 3: Trunk mobility WFL with minimal discomfort to complete household

## 2025-01-02 ENCOUNTER — HOSPITAL ENCOUNTER (OUTPATIENT)
Dept: PHYSICAL THERAPY | Age: 55
Setting detail: THERAPIES SERIES
Discharge: HOME | End: 2025-01-02

## 2025-01-02 NOTE — PROGRESS NOTES
Physical Therapy  [facility]  Rehab and Wellness    Date: 2025  Patient Name: Jazzy KAPLAN Ella        : 1970       Pt Cancelled Appt due to weather      Marlena Ayala Date: 2025

## 2025-01-04 ENCOUNTER — HOSPITAL ENCOUNTER (EMERGENCY)
Age: 55
Discharge: HOME | End: 2025-01-04
Attending: EMERGENCY MEDICINE
Payer: COMMERCIAL

## 2025-01-04 VITALS
RESPIRATION RATE: 18 BRPM | WEIGHT: 288.9 LBS | DIASTOLIC BLOOD PRESSURE: 101 MMHG | BODY MASS INDEX: 49.32 KG/M2 | TEMPERATURE: 99.4 F | SYSTOLIC BLOOD PRESSURE: 165 MMHG | OXYGEN SATURATION: 96 % | HEART RATE: 100 BPM | HEIGHT: 64 IN

## 2025-01-04 DIAGNOSIS — H69.93 DYSFUNCTION OF BOTH EUSTACHIAN TUBES: Primary | ICD-10-CM

## 2025-01-04 DIAGNOSIS — M54.41 CHRONIC RIGHT-SIDED LOW BACK PAIN WITH RIGHT-SIDED SCIATICA: ICD-10-CM

## 2025-01-04 DIAGNOSIS — G89.29 CHRONIC RIGHT-SIDED LOW BACK PAIN WITH RIGHT-SIDED SCIATICA: ICD-10-CM

## 2025-01-04 PROCEDURE — 99283 EMERGENCY DEPT VISIT LOW MDM: CPT

## 2025-01-04 RX ORDER — FLUTICASONE PROPIONATE 50 MCG
2 SPRAY, SUSPENSION (ML) NASAL DAILY
Qty: 16 G | Refills: 0 | Status: SHIPPED | OUTPATIENT
Start: 2025-01-04

## 2025-01-04 ASSESSMENT — PAIN DESCRIPTION - LOCATION: LOCATION: EAR

## 2025-01-04 ASSESSMENT — PAIN DESCRIPTION - PAIN TYPE: TYPE: ACUTE PAIN

## 2025-01-04 ASSESSMENT — PAIN SCALES - GENERAL: PAINLEVEL_OUTOF10: 4

## 2025-01-04 ASSESSMENT — PAIN - FUNCTIONAL ASSESSMENT: PAIN_FUNCTIONAL_ASSESSMENT: ACTIVITIES ARE NOT PREVENTED

## 2025-01-04 ASSESSMENT — PAIN DESCRIPTION - ORIENTATION: ORIENTATION: RIGHT;LEFT

## 2025-01-04 ASSESSMENT — PAIN DESCRIPTION - DESCRIPTORS: DESCRIPTORS: SHARP

## 2025-01-05 NOTE — ED PROVIDER NOTES
recent primary care visit reviewed, medication list reviewed, patient is on gabapentin, meloxicam, Flexeril, patient has been seen through emergency department for back issues, has received steroid burst in the past.     54-year-old female with multiple medical issues, chronic back pain, presenting to the emergency department for your ear symptoms as noted above.  She was concerned she has an ear infection, no evidence of this on exam.  She may have element of eustachian tube dysfunction, she has seasonal allergies, is already taking an oral second-generation antihistamine.  Advised on nasal steroid.  Secondary complaint of chronic back pain, she has no red flags for cord compression and admits that symptoms have not changed.  She is unhappy with lack of relief from multiple medications and physical therapy.  I encouraged her to continue attending therapy, and to follow-up with her primary care provider in regards to this longstanding issue.         Impression:      ICD-10-CM    1. Dysfunction of both eustachian tubes  H69.93       2. Chronic right-sided low back pain with right-sided sciatica  M54.41     G89.29          Disposition:  home        Ketan Carrasquillo MD      (Please note that portions of this note were completed with a voice recognition program. Efforts were made to edit the dictations but occasionally words are mis-transcribed.)               Ketan Carrasquillo MD  01/04/25 2027

## 2025-01-06 ENCOUNTER — HOSPITAL ENCOUNTER (OUTPATIENT)
Dept: PHYSICAL THERAPY | Age: 55
Setting detail: THERAPIES SERIES
Discharge: HOME | End: 2025-01-06
Payer: COMMERCIAL

## 2025-01-06 PROCEDURE — 97012 MECHANICAL TRACTION THERAPY: CPT

## 2025-01-06 ASSESSMENT — PAIN SCALES - GENERAL: PAINLEVEL_OUTOF10: 6

## 2025-01-06 NOTE — PROGRESS NOTES
Phone: [phone]                 [facility]      Fax: [phone]                            Outpatient Physical Therapy                                                                            Daily Note    Date: 2025  Patient Name: Jazzy Jaramillo        MRN: [id number]   ACCT#:  [id number]  : 1970  (54 y.o.)    Referring Provider (secondary): Shelli BELCHER         Diagnosis: Lumbar pain  Treatment Diagnosis: Lumbar pain with radiculopathy    Onset Date: 24  PT Insurance Information: [place]    Per Physician Order  Total # of Visits to Date: 3  No Show: 0  Canceled Appointment: 0  Plan of Care/Certification Expiration Date: 25    Pre-Treatment Pain:  6/10     Assessment  Assessment: Patient reports half day of pain reduction to 3/10 following last traction.   Continued with pelvic traction and increased to 60#/45# x 12 minutes.  Holding ex until lumbar pain decreases.  To MD 25.    Plan  Continue with current plan of care    Exercises/Modalities/Manual:  See DocFlow Sheet    Education: Progression traction          Goals  (Total # of Visits to Date: 3)   Short Term Goals  Time Frame for Short Term Goals: 5 visits  Short Term Goal 1: Educate on home exercise program for stretching and strengthening    Long Term Goals  Time Frame for Long Term Goals : 12 visits  Long Term Goal 1: Decrease subjective lumbar pain to <4/10 with daily household tasks and functional ambulation  Long Term Goal 2: Decrease RLE radicular pain by > 50% in severity and frequency  Long Term Goal 3: Trunk mobility WFL with minimal discomfort to complete household tasks  Long Term Goal 4: Functionally be able to stand to complete full meal prep or dishes    Post Treatment Pain:  4/10    Time In: 1115    Time Out : 1150        Timed Code Treatment Minutes: 0 Minutes  Total Treatment Time: 35 Minutes    NERIS GOFF PT     Date: 2025

## 2025-01-09 ENCOUNTER — HOSPITAL ENCOUNTER (OUTPATIENT)
Dept: PHYSICAL THERAPY | Age: 55
Setting detail: THERAPIES SERIES
Discharge: HOME | End: 2025-01-09
Payer: COMMERCIAL

## 2025-01-09 NOTE — PROGRESS NOTES
[facility]  Rehab and Wellness    Date: 2025  Patient Name: Jazzy KAPLAN Daphnes        : 1970       Pt Cancelled Appt due to Illness      Marlena Ayala Date: 2025

## 2025-01-13 ENCOUNTER — HOSPITAL ENCOUNTER (OUTPATIENT)
Dept: PHYSICAL THERAPY | Age: 55
Setting detail: THERAPIES SERIES
Discharge: HOME | End: 2025-01-13
Payer: COMMERCIAL

## 2025-01-13 NOTE — PROGRESS NOTES
Physical Therapy  [facility]  Rehab and Wellness    Date: 2025  Patient Name: Jazzy KAPLAN Ella        : 1970       Pt Cancelled Appt due to Illness      Marlena Ayala Date: 2025

## 2025-04-15 ENCOUNTER — HOSPITAL ENCOUNTER (EMERGENCY)
Age: 55
Discharge: LEFT BEFORE DISCHARGE | End: 2025-04-15

## 2025-04-15 VITALS
DIASTOLIC BLOOD PRESSURE: 124 MMHG | BODY MASS INDEX: 47.56 KG/M2 | HEIGHT: 64 IN | OXYGEN SATURATION: 97 % | RESPIRATION RATE: 19 BRPM | HEART RATE: 98 BPM | SYSTOLIC BLOOD PRESSURE: 175 MMHG | WEIGHT: 278.6 LBS | TEMPERATURE: 98.9 F

## 2025-04-15 ASSESSMENT — PAIN - FUNCTIONAL ASSESSMENT
PAIN_FUNCTIONAL_ASSESSMENT: ACTIVITIES ARE NOT PREVENTED
PAIN_FUNCTIONAL_ASSESSMENT: 0-10

## 2025-04-15 ASSESSMENT — PAIN SCALES - GENERAL: PAINLEVEL_OUTOF10: 7

## 2025-04-15 ASSESSMENT — PAIN DESCRIPTION - ORIENTATION: ORIENTATION: RIGHT;LEFT

## 2025-04-15 ASSESSMENT — PAIN DESCRIPTION - DESCRIPTORS: DESCRIPTORS: ACHING;THROBBING

## 2025-04-15 ASSESSMENT — PAIN DESCRIPTION - FREQUENCY: FREQUENCY: CONTINUOUS

## 2025-04-15 ASSESSMENT — PAIN DESCRIPTION - LOCATION: LOCATION: EAR

## 2025-04-15 ASSESSMENT — PAIN DESCRIPTION - PAIN TYPE: TYPE: ACUTE PAIN

## 2025-05-31 ENCOUNTER — HOSPITAL ENCOUNTER (EMERGENCY)
Age: 55
Discharge: HOME | End: 2025-05-31
Attending: FAMILY MEDICINE
Payer: COMMERCIAL

## 2025-05-31 VITALS
BODY MASS INDEX: 47.8 KG/M2 | HEART RATE: 110 BPM | HEIGHT: 64 IN | WEIGHT: 280 LBS | RESPIRATION RATE: 18 BRPM | SYSTOLIC BLOOD PRESSURE: 185 MMHG | DIASTOLIC BLOOD PRESSURE: 113 MMHG | TEMPERATURE: 98.1 F | OXYGEN SATURATION: 98 %

## 2025-05-31 DIAGNOSIS — H10.13 ALLERGIC CONJUNCTIVITIS OF BOTH EYES: Primary | ICD-10-CM

## 2025-05-31 PROCEDURE — 99283 EMERGENCY DEPT VISIT LOW MDM: CPT

## 2025-05-31 PROCEDURE — 6370000000 HC RX 637 (ALT 250 FOR IP): Performed by: FAMILY MEDICINE

## 2025-05-31 RX ORDER — HYDROCHLOROTHIAZIDE 12.5 MG/1
12.5 CAPSULE ORAL DAILY
COMMUNITY
Start: 2025-05-06 | End: 2026-05-06

## 2025-05-31 RX ORDER — KETOTIFEN FUMARATE 0.35 MG/ML
1 SOLUTION/ DROPS OPHTHALMIC ONCE
Status: COMPLETED | OUTPATIENT
Start: 2025-05-31 | End: 2025-05-31

## 2025-05-31 RX ADMIN — KETOTIFEN FUMARATE 1 DROP: 0.25 SOLUTION/ DROPS OPHTHALMIC at 21:24

## 2025-05-31 ASSESSMENT — PAIN SCALES - GENERAL: PAINLEVEL_OUTOF10: 4

## 2025-05-31 ASSESSMENT — PAIN DESCRIPTION - ORIENTATION: ORIENTATION: RIGHT;LEFT

## 2025-05-31 ASSESSMENT — PAIN DESCRIPTION - LOCATION: LOCATION: EAR

## 2025-05-31 ASSESSMENT — PAIN DESCRIPTION - DESCRIPTORS: DESCRIPTORS: PRESSURE

## 2025-05-31 ASSESSMENT — PAIN - FUNCTIONAL ASSESSMENT: PAIN_FUNCTIONAL_ASSESSMENT: ACTIVITIES ARE NOT PREVENTED

## 2025-05-31 ASSESSMENT — ENCOUNTER SYMPTOMS: EYE REDNESS: 1

## 2025-05-31 ASSESSMENT — PAIN DESCRIPTION - PAIN TYPE: TYPE: ACUTE PAIN

## 2025-06-01 NOTE — ED PROVIDER NOTES
[facility]  EMERGENCY DEPARTMENT ENCOUNTER      Pt Name: Jazzy Jaramillo  MRN: [id number]  Birthdate 1970  Date of evaluation: 5/31/2025  Provider: Travis Rebolledo MD    CHIEF COMPLAINT       Chief Complaint   Patient presents with    Eye Drainage     Bilateral red eyes and drainage since this AM. States she believes it is seasonal allergies.         HISTORY OF PRESENT ILLNESS      Jazzy Jaramillo is a 55 y.o. female who presents to the emergency department via private vehicle, patient complaining of bilateral eye redness and drainage, onset this morning when she woke up, denies any chemical exposure to her eye, has had some watery discharge, denies any change in her vision.  Patient does acknowledge she has a lot of seasonal allergies indicates everything seems to be up in her face area, occasional ear discomfort.  Denies any fever denies any trauma.    PCP: BARRY Liu        REVIEW OF SYSTEMS       Review of Systems   Eyes:  Positive for redness.   All other systems reviewed and are negative.        PAST MEDICAL HISTORY     Past Medical History:   Diagnosis Date    Anemia     Depression     Diabetes mellitus (HCC)     Hyperlipidemia     Hypertension          SURGICAL HISTORY       Past Surgical History:   Procedure Laterality Date    CHOLECYSTECTOMY      HYSTERECTOMY (CERVIX STATUS UNKNOWN)      TONSILLECTOMY           CURRENT MEDICATIONS       Current Discharge Medication List        CONTINUE these medications which have NOT CHANGED    Details   hydroCHLOROthiazide 12.5 MG capsule Take 1 capsule by mouth daily      fluticasone (FLONASE) 50 MCG/ACT nasal spray 2 sprays by Each Nostril route daily  Qty: 16 g, Refills: 0      gabapentin (NEURONTIN) 100 MG capsule Take 1 capsule by mouth 2 times daily for 15 days. Intended supply: 90 days  Qty: 30 capsule, Refills: 1      methylPREDNISolone (MEDROL, TAVON,) 4 MG tablet Take by mouth.  Qty: 1 kit, Refills: 0      ibuprofen (ADVIL;MOTRIN) 800 MG

## 2025-06-01 NOTE — DISCHARGE INSTRUCTIONS
The redness in your eyes is most likely allergic conjunctivitis, we will start you on an antihistamine eyedrop with first dose emergency room and sent home with you, continue with your Flonase and loratadine (Claritin) daily as well